# Patient Record
Sex: MALE | Race: WHITE | NOT HISPANIC OR LATINO | Employment: UNEMPLOYED | URBAN - METROPOLITAN AREA
[De-identification: names, ages, dates, MRNs, and addresses within clinical notes are randomized per-mention and may not be internally consistent; named-entity substitution may affect disease eponyms.]

---

## 2018-04-24 ENCOUNTER — TRANSFERRED RECORDS (OUTPATIENT)
Dept: HEALTH INFORMATION MANAGEMENT | Facility: CLINIC | Age: 43
End: 2018-04-24

## 2018-04-25 ENCOUNTER — MEDICAL CORRESPONDENCE (OUTPATIENT)
Dept: HEALTH INFORMATION MANAGEMENT | Facility: CLINIC | Age: 43
End: 2018-04-25

## 2018-04-25 ENCOUNTER — TRANSFERRED RECORDS (OUTPATIENT)
Dept: HEALTH INFORMATION MANAGEMENT | Facility: CLINIC | Age: 43
End: 2018-04-25

## 2018-04-30 ENCOUNTER — OFFICE VISIT (OUTPATIENT)
Dept: NEUROSURGERY | Facility: CLINIC | Age: 43
End: 2018-04-30
Attending: NEUROLOGICAL SURGERY
Payer: COMMERCIAL

## 2018-04-30 ENCOUNTER — OFFICE VISIT (OUTPATIENT)
Dept: NEUROSURGERY | Facility: CLINIC | Age: 43
End: 2018-04-30
Payer: COMMERCIAL

## 2018-04-30 VITALS
HEART RATE: 72 BPM | RESPIRATION RATE: 16 BRPM | DIASTOLIC BLOOD PRESSURE: 80 MMHG | WEIGHT: 212.4 LBS | TEMPERATURE: 97 F | SYSTOLIC BLOOD PRESSURE: 117 MMHG | OXYGEN SATURATION: 98 %

## 2018-04-30 DIAGNOSIS — D35.2 PITUITARY MACROADENOMA (H): Primary | ICD-10-CM

## 2018-04-30 DIAGNOSIS — E23.6 PITUITARY MASS (H): ICD-10-CM

## 2018-04-30 DIAGNOSIS — E23.6 PITUITARY MASS (H): Primary | ICD-10-CM

## 2018-04-30 LAB
ANION GAP SERPL CALCULATED.3IONS-SCNC: 5 MMOL/L (ref 3–14)
BUN SERPL-MCNC: 13 MG/DL (ref 7–30)
CALCIUM SERPL-MCNC: 8.9 MG/DL (ref 8.5–10.1)
CHLORIDE SERPL-SCNC: 106 MMOL/L (ref 94–109)
CO2 SERPL-SCNC: 27 MMOL/L (ref 20–32)
CORTIS SERPL-MCNC: 2.8 UG/DL (ref 4–22)
CREAT SERPL-MCNC: 1.06 MG/DL (ref 0.66–1.25)
FSH SERPL-ACNC: 15.2 IU/L (ref 0.7–10.8)
GFR SERPL CREATININE-BSD FRML MDRD: 76 ML/MIN/1.7M2
GLUCOSE SERPL-MCNC: 85 MG/DL (ref 70–99)
LH SERPL-ACNC: 2.7 IU/L (ref 1.5–9.3)
POTASSIUM SERPL-SCNC: 3.7 MMOL/L (ref 3.4–5.3)
PROLACTIN SERPL-MCNC: 8 UG/L (ref 2–18)
SODIUM SERPL-SCNC: 138 MMOL/L (ref 133–144)
T4 FREE SERPL-MCNC: 0.75 NG/DL (ref 0.76–1.46)
TSH SERPL DL<=0.005 MIU/L-ACNC: 1.37 MU/L (ref 0.4–4)

## 2018-04-30 PROCEDURE — 83002 ASSAY OF GONADOTROPIN (LH): CPT | Performed by: STUDENT IN AN ORGANIZED HEALTH CARE EDUCATION/TRAINING PROGRAM

## 2018-04-30 PROCEDURE — 82533 TOTAL CORTISOL: CPT | Performed by: STUDENT IN AN ORGANIZED HEALTH CARE EDUCATION/TRAINING PROGRAM

## 2018-04-30 PROCEDURE — 84443 ASSAY THYROID STIM HORMONE: CPT | Performed by: STUDENT IN AN ORGANIZED HEALTH CARE EDUCATION/TRAINING PROGRAM

## 2018-04-30 PROCEDURE — 36415 COLL VENOUS BLD VENIPUNCTURE: CPT | Performed by: STUDENT IN AN ORGANIZED HEALTH CARE EDUCATION/TRAINING PROGRAM

## 2018-04-30 PROCEDURE — 84403 ASSAY OF TOTAL TESTOSTERONE: CPT | Performed by: STUDENT IN AN ORGANIZED HEALTH CARE EDUCATION/TRAINING PROGRAM

## 2018-04-30 PROCEDURE — 83001 ASSAY OF GONADOTROPIN (FSH): CPT | Performed by: STUDENT IN AN ORGANIZED HEALTH CARE EDUCATION/TRAINING PROGRAM

## 2018-04-30 PROCEDURE — 82024 ASSAY OF ACTH: CPT | Performed by: STUDENT IN AN ORGANIZED HEALTH CARE EDUCATION/TRAINING PROGRAM

## 2018-04-30 PROCEDURE — 84146 ASSAY OF PROLACTIN: CPT | Performed by: STUDENT IN AN ORGANIZED HEALTH CARE EDUCATION/TRAINING PROGRAM

## 2018-04-30 PROCEDURE — 84305 ASSAY OF SOMATOMEDIN: CPT | Performed by: STUDENT IN AN ORGANIZED HEALTH CARE EDUCATION/TRAINING PROGRAM

## 2018-04-30 PROCEDURE — 84439 ASSAY OF FREE THYROXINE: CPT | Performed by: STUDENT IN AN ORGANIZED HEALTH CARE EDUCATION/TRAINING PROGRAM

## 2018-04-30 PROCEDURE — 80048 BASIC METABOLIC PNL TOTAL CA: CPT | Performed by: STUDENT IN AN ORGANIZED HEALTH CARE EDUCATION/TRAINING PROGRAM

## 2018-04-30 PROCEDURE — 83003 ASSAY GROWTH HORMONE (HGH): CPT | Performed by: STUDENT IN AN ORGANIZED HEALTH CARE EDUCATION/TRAINING PROGRAM

## 2018-04-30 NOTE — LETTER
4/30/2018       RE: Vincenzo Avery  [address] [place] [address] [address]  [facility] [address]     Dear Colleague,    Thank you for referring your patient, Vincenzo Avery, to the [facility] NEUROSURGERY at [facility]. Please see a copy of my visit note below.    4/30/2018       Dear Dr. Meneses:    This 43 year old left handed man who presents for pituitary mass lesion found after presenting with L peripheral field cut which started 2 years ago. Within the last 2-3 months, the patient noticed that his Right peripheral fields were increasingly diminished. Headaches started 4 months prior and are worsening. He does note tinnitus, imbalance, positional headache (leaning forward and with Valsalva), and marked fatigue.     Denies galactorrhea or breast enlargement. No changes in shoe or glove size. Denies issues with erectile dysfunction or libido. Has not been tested for diabetes. Denies weight loss. Reports weight gain of 15 lbs. since December 2017. Denies polydipsia. Does report nocturia (3-4 episodes). Denies colorless urine. Reports chronically poor gustation secondary to tongue injury from licking battery as a child. Reports poor olfaction of unclear onset.     Patient was seen by Dr. Meneses, in [place], on initial presentation who had findings of bitemporal hemianopsia. Visual acuity per Dr. Meneses (OD 20/50; OS 20/100). OCT showed minimum rim width within normal limits both eyes. RNFLT shows thinning temporal both eyes, more so on the left.     Past Medical History:    15 years prior, patient had infection from wisdom tooth extraction complicated by sepsis requiring R orchiectomy. No other known medical diagnoses or hospitalizations.     Surgical History:   R orchiectomy  R wrist ORIF     Social history: never smoker. + cannabis. Denies other drugs. 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 Has two children: 19 yo and 9 year old.     Family history: no history of brain  "tumor. + diabetes.     Review of systems: As above.    Physical exam:   /80 (BP Location: Right arm, Patient Position: Right side, Cuff Size: Adult Regular)  Pulse 72  Temp 97  F (36.1  C) (Oral)  Resp 16  Wt 96.3 kg (212 lb 6.4 oz)  SpO2 98%    General: Awake and alert and in no acute distress.  Pulm: Breathing comfortably on room air  CN: Symmetric browlift, smile, tongue protrusion, palate elevation, and sternocleidomastoids. No dysarthria. Extraocular muscles are all intact. Diplopia on bilateral horizontal gaze and inferior gaze. No diplopia on up gaze. Pupils react bilaterally and equally without afferent pupillary defect. Bilateral temporal field loss L significantly greater than R on direct confrontation. Coordination: Intact finger-nose-finger but more difficult due to vision loss on the L.   Motor: No pronator drift. Good muscle bulk throughout. 5 out of 5 strength in bilateral upper and lower extremities  Reflexes: 2+ reflexes bilateral biceps and patellar tendons.     Imaging:  MRI brain from 4/25/18: large pituitary lesion with mass effect on optic chiasm, Right internal carotid encasement, erosion of the sphenoid sinus and cavernous sinus on the right.     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     Differential diagnosis was discussed with the patient including the further diagnostic workup that is required to determine the best course of action for treatment. We discussed that treatment would have the primary goal of preventing further visual deterioration.  Plan:  - pituitary labs  - pending labs; possible resection in 2-3 weeks with ENT  - will call patient concerning results and plan after pituitary labs result    Patient seen and discussed with MD Eddie Ace \"Sudhir\" MD Elaina, Neurosurgery, PGY-1    I have reviewed the history. I have seen and examined the patient myself and agree with the " assessment and plan above. The patient has vision loss secondary to a very large (4 cm) pituitary adenoma. The tumor has expanded the sellar floor and has marked right parasellar extension. The pituitary gland appears to be displaced superiorly and to the left. The optic apparatus is compressed. The majority of this 45 minute visit was spent discussing management of pituitary tumors. We went over the endoscopic transnasal approach, assuming this will not be a prolactinoma. He understands the risks of surgery including death, stroke, carotid artery injury, CSF leak, panhypopituitarism, infection, hemorrhage, need for reoperation, and agrees to proceed.    ADDENDUM: Prolactin level was within the reference range. We shall proceed with surgery scheduling.    ODALYS Neely MD          Again, thank you for allowing me to participate in the care of your patient.      Sincerely,    Rolf Neely MD

## 2018-04-30 NOTE — LETTER
4/30/2018       RE: Vincenzo Avery  [facility] [facility] [address]  [facility] [address]     Dear Colleague,    Thank you for referring your patient, Vincenzo Avery, to the [facility]. Please see a copy of my visit note below.    Patient with pituitary adenoma. I asked my colleague Dr. Neely to see him today. Please see his note for full details.    Again, thank you for allowing me to participate in the care of your patient.      Sincerely,    Carroll Pichardo MD

## 2018-04-30 NOTE — MR AVS SNAPSHOT
After Visit Summary   4/30/2018    Vincenzo Avery    MRN: [id number]           Patient Information     Date Of Birth          1975        Visit Information        Provider Department      4/30/2018 3:30 PM Rolf Neely MD [facility] Neurosurgery        Today's Diagnoses     Pituitary macroadenoma (H)    -  1       Follow-ups after your visit        Your next 10 appointments already scheduled     Jun 29, 2018  7:00 AM CDT   MR BRAIN W/O & W CONTRAST with UUMR1   [facility], [place], [facility] ([facility], [facility])    [address] [id number] [phone]           Take your medicines as usual, unless your doctor tells you not to. Bring a list of your current medicines to your exam (including vitamins, minerals and over-the-counter drugs).  You may or may not receive intravenous (IV) contrast for this exam pending the discretion of the Radiologist.  You do not need to do anything special to prepare.  The MRI machine uses a strong magnet. Please wear clothes without metal (snaps, zippers). A sweatsuit works well, or we may give you a hospital gown.  Please remove any body piercings and hair extensions before you arrive. You will also remove watches, jewelry, hairpins, wallets, dentures, partial dental plates and hearing aids. You may wear contact lenses, and you may be able to wear your rings. We have a safe place to keep your personal items, but it is safer to leave them at home.  **IMPORTANT** THE INSTRUCTIONS BELOW ARE ONLY FOR THOSE PATIENTS WHO HAVE BEEN PRESCRIBED SEDATION OR GENERAL ANESTHESIA DURING THEIR MRI PROCEDURE:  IF YOUR DOCTOR PRESCRIBED ORAL SEDATION (take medicine to help you relax during your exam):   You must get the medicine from your doctor (oral medication) before you arrive. Bring the medicine to the exam. Do not take it at home. You ll be told when to take it upon arriving for your exam.   Arrive one hour  early. Bring someone who can take you home after the test. Your medicine will make you sleepy. After the exam, you may not drive, take a bus or take a taxi by yourself.  IF YOUR DOCTOR PRESCRIBED IV SEDATION:   Arrive one hour early. Bring someone who can take you home after the test. Your medicine will make you sleepy. After the exam, you may not drive, take a bus or take a taxi by yourself.   No eating 6 hours before your exam. You may have clear liquids up until 4 hours before your exam. (Clear liquids include water, clear tea, black coffee and fruit juice without pulp.)  IF YOUR DOCTOR PRESCRIBED ANESTHESIA (be asleep for your exam):   Arrive 1 1/2 hours early. Bring someone who can take you home after the test. You may not drive, take a bus or take a taxi by yourself.   No eating 8 hours before your exam. You may have clear liquids up until 4 hours before your exam. (Clear liquids include water, clear tea, black coffee and fruit juice without pulp.)   You will spend four to five hours in the recovery room.  Please call the Imaging Department at your exam site with any questions.            Jun 29, 2018  8:00 AM CDT   CT HEAD W/O CONTRAST with UUCT1   [facility], [place], [place] ([facility], [facility])    [address] [facility] [address] [phone]           Please bring any scans or X-rays taken at other hospitals, if similar tests were done. Also bring a list of your medicines, including vitamins, minerals and over-the-counter drugs. It is safest to leave personal items at home.  Be sure to tell your doctor:   If you have any allergies.   If there s any chance you are pregnant.   If you are breastfeeding.  You do not need to do anything special to prepare for this exam.  Please wear loose clothing, such as a sweat suit or jogging clothes. Avoid snaps, zippers and other metal. We may ask you to undress and put on a hospital gown.            Jun 29, 2018   Procedure  with Jo-Ann Green MD   [facility], [place], Same Day Surgery (--)    [address] [facility] [id number] [phone]              Future tests that were ordered for you today     Open Future Orders        Priority Expected Expires Ordered    MRI Brain with & without gadolinium [XWQ296] Routine  5/3/2019 5/3/2018    CT Head without contrast [TVV611] Routine  5/3/2019 5/3/2018            Who to contact     Please call your clinic at [phone] to:    Ask questions about your health    Make or cancel appointments    Discuss your medicines    Learn about your test results    Speak to your doctor            Additional Information About Your Visit        [email or web address] Information     [employer] is an electronic gateway that provides easy, online access to your medical records. With [employer], you can request a clinic appointment, read your test results, renew a prescription or communicate with your care team.     To sign up for [employer] visit the website at [email or web address]   You will be asked to enter the access code listed below, as well as some personal information. Please follow the directions to create your username and password.     Your access code is: [email or web address]  Expires: 2018  6:30 AM     Your access code will  in 90 days. If you need help or a new code, please contact your [facility] or call [phone] for assistance.        Care EveryWhere ID     This is your Care EveryWhere ID. This could be used by other organizations to access your [place] medical records  [id number]         Blood Pressure from Last 3 Encounters:   18 117/80    Weight from Last 3 Encounters:   18 96.3 kg (212 lb 6.4 oz)              Today, you had the following     No orders found for display       Primary Care Provider Fax #    Physician No Ref-Primary [phone]       No address on file        Equal Access to Services     VICENTE RODARTE : Josee noonan  Gama, becky andrews, deonte tanmaychris yee, abebe medin hayaan alisiatracy sarahdonna laamielmer richie. So [facility] [phone].    ATENCIÓN: Si habla español, tiene a peralta disposición servicios gratuitos de asistencia lingüística. Mari al [phone].    We comply with applicable federal civil rights laws and [place] laws. We do not discriminate on the basis of race, color, national origin, age, disability, sex, sexual orientation, or gender identity.            Thank you!     Thank you for choosing [facility] NEUROSURGERY  for your care. Our goal is always to provide you with excellent care. Hearing back from our patients is one way we can continue to improve our services. Please take a few minutes to complete the written survey that you may receive in the mail after your visit with us. Thank you!             Your Updated Medication List - Protect others around you: Learn how to safely use, store and throw away your medicines at www.disposemymeds.org.      Notice  As of 4/30/2018 11:59 PM    You have not been prescribed any medications.

## 2018-04-30 NOTE — LETTER
4/30/2018    RE: Vincenzo Avery  [address] [place] [address] [address]  [facility] [address]     4/30/2018       Dear Dr. Meneses:    This 43 year old left handed man who presents for pituitary mass lesion found after presenting with L peripheral field cut which started 2 years ago. Within the last 2-3 months, the patient noticed that his Right peripheral fields were increasingly diminished. Headaches started 4 months prior and are worsening. He does note tinnitus, imbalance, positional headache (leaning forward and with Valsalva), and marked fatigue.     Denies galactorrhea or breast enlargement. No changes in shoe or glove size. Denies issues with erectile dysfunction or libido. Has not been tested for diabetes. Denies weight loss. Reports weight gain of 15 lbs. since December 2017. Denies polydipsia. Does report nocturia (3-4 episodes). Denies colorless urine. Reports chronically poor gustation secondary to tongue injury from licking battery as a child. Reports poor olfaction of unclear onset.     Patient was seen by Dr. Meneses, in [place], on initial presentation who had findings of bitemporal hemianopsia. Visual acuity per Dr. Meneses (OD 20/50; OS 20/100). OCT showed minimum rim width within normal limits both eyes. RNFLT shows thinning temporal both eyes, more so on the left.     Past Medical History:    15 years prior, patient had infection from wisdom tooth extraction complicated by sepsis requiring R orchiectomy. No other known medical diagnoses or hospitalizations.     Surgical History:   R orchiectomy  R wrist ORIF     Social history: never smoker. + cannabis. Denies other drugs. 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 Has two children: 19 yo and 9 year old.     Family history: no history of brain tumor. + diabetes.     Review of systems: As above.    Physical exam:   /80 (BP Location: Right arm, Patient Position: Right side, Cuff Size: Adult Regular)  Pulse 72  Temp 97  F (36.1  C) (Oral)   "Resp 16  Wt 96.3 kg (212 lb 6.4 oz)  SpO2 98%    General: Awake and alert and in no acute distress.  Pulm: Breathing comfortably on room air  CN: Symmetric browlift, smile, tongue protrusion, palate elevation, and sternocleidomastoids. No dysarthria. Extraocular muscles are all intact. Diplopia on bilateral horizontal gaze and inferior gaze. No diplopia on up gaze. Pupils react bilaterally and equally without afferent pupillary defect. Bilateral temporal field loss L significantly greater than R on direct confrontation. Coordination: Intact finger-nose-finger but more difficult due to vision loss on the L.   Motor: No pronator drift. Good muscle bulk throughout. 5 out of 5 strength in bilateral upper and lower extremities  Reflexes: 2+ reflexes bilateral biceps and patellar tendons.     Imaging:  MRI brain from 4/25/18: large pituitary lesion with mass effect on optic chiasm, Right internal carotid encasement, erosion of the sphenoid sinus and cavernous sinus on the right.     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     Differential diagnosis was discussed with the patient including the further diagnostic workup that is required to determine the best course of action for treatment. We discussed that treatment would have the primary goal of preventing further visual deterioration.  Plan:  - pituitary labs  - pending labs; possible resection in 2-3 weeks with ENT  - will call patient concerning results and plan after pituitary labs result    Patient seen and discussed with MD Eddie Ace \"Sudhir\" MD Elaina, Neurosurgery, PGY-1    I have reviewed the history. I have seen and examined the patient myself and agree with the assessment and plan above. The patient has vision loss secondary to a very large (4 cm) pituitary adenoma. The tumor has expanded the sellar floor and has marked right parasellar extension. The pituitary " gland appears to be displaced superiorly and to the left. The optic apparatus is compressed. The majority of this 45 minute visit was spent discussing management of pituitary tumors. We went over the endoscopic transnasal approach, assuming this will not be a prolactinoma. He understands the risks of surgery including death, stroke, carotid artery injury, CSF leak, panhypopituitarism, infection, hemorrhage, need for reoperation, and agrees to proceed.    ADDENDUM: Prolactin level was within the reference range. We shall proceed with surgery scheduling.    MD Rolf Gilmore MD

## 2018-04-30 NOTE — LETTER
Date:May 4, 2018      Patient was self referred, no letter generated. Do not send.        [facility] Health Information

## 2018-04-30 NOTE — MR AVS SNAPSHOT
After Visit Summary   4/30/2018    Vincenzo Avery    MRN: [id number]           Patient Information     Date Of Birth          1975        Visit Information        Provider Department      4/30/2018 3:00 PM Carroll Pichardo MD [facility]        Today's Diagnoses     Pituitary mass (H)    -  1       Follow-ups after your visit        Your next 10 appointments already scheduled     Jun 29, 2018  7:00 AM CDT   MR BRAIN W/O & W CONTRAST with UUMR1   [facility], [place], [facility] ([facility], [facility])    [address] [address] [phone]           Take your medicines as usual, unless your doctor tells you not to. Bring a list of your current medicines to your exam (including vitamins, minerals and over-the-counter drugs).  You may or may not receive intravenous (IV) contrast for this exam pending the discretion of the Radiologist.  You do not need to do anything special to prepare.  The MRI machine uses a strong magnet. Please wear clothes without metal (snaps, zippers). A sweatsuit works well, or we may give you a hospital gown.  Please remove any body piercings and hair extensions before you arrive. You will also remove watches, jewelry, hairpins, wallets, dentures, partial dental plates and hearing aids. You may wear contact lenses, and you may be able to wear your rings. We have a safe place to keep your personal items, but it is safer to leave them at home.  **IMPORTANT** THE INSTRUCTIONS BELOW ARE ONLY FOR THOSE PATIENTS WHO HAVE BEEN PRESCRIBED SEDATION OR GENERAL ANESTHESIA DURING THEIR MRI PROCEDURE:  IF YOUR DOCTOR PRESCRIBED ORAL SEDATION (take medicine to help you relax during your exam):   You must get the medicine from your doctor (oral medication) before you arrive. Bring the medicine to the exam. Do not take it at home. You ll be told when to take it upon arriving for your exam.   Arrive one hour early.  Bring someone who can take you home after the test. Your medicine will make you sleepy. After the exam, you may not drive, take a bus or take a taxi by yourself.  IF YOUR DOCTOR PRESCRIBED IV SEDATION:   Arrive one hour early. Bring someone who can take you home after the test. Your medicine will make you sleepy. After the exam, you may not drive, take a bus or take a taxi by yourself.   No eating 6 hours before your exam. You may have clear liquids up until 4 hours before your exam. (Clear liquids include water, clear tea, black coffee and fruit juice without pulp.)  IF YOUR DOCTOR PRESCRIBED ANESTHESIA (be asleep for your exam):   Arrive 1 1/2 hours early. Bring someone who can take you home after the test. You may not drive, take a bus or take a taxi by yourself.   No eating 8 hours before your exam. You may have clear liquids up until 4 hours before your exam. (Clear liquids include water, clear tea, black coffee and fruit juice without pulp.)   You will spend four to five hours in the recovery room.  Please call the Imaging Department at your exam site with any questions.            Jun 29, 2018  8:00 AM CDT   CT HEAD W/O CONTRAST with UUCT1   [facility], [place], [place] ([facility], [facility])    [address] [address] [phone]           Please bring any scans or X-rays taken at other hospitals, if similar tests were done. Also bring a list of your medicines, including vitamins, minerals and over-the-counter drugs. It is safest to leave personal items at home.  Be sure to tell your doctor:   If you have any allergies.   If there s any chance you are pregnant.   If you are breastfeeding.  You do not need to do anything special to prepare for this exam.  Please wear loose clothing, such as a sweat suit or jogging clothes. Avoid snaps, zippers and other metal. We may ask you to undress and put on a hospital gown.            Jun 29, 2018   Procedure with  "Jo-Ann Green MD   [facility], [place], Same Day Surgery (--)    [address]  [place] [id number] [phone]              Future tests that were ordered for you today     Open Future Orders        Priority Expected Expires Ordered    MRI Brain with & without gadolinium [JSB157] Routine  5/3/2019 5/3/2018    CT Head without contrast [TKY308] Routine  5/3/2019 5/3/2018            Who to contact     If you have questions or need follow up information about today's clinic visit or your schedule please contact [facility] directly at [phone].  Normal or non-critical lab and imaging results will be communicated to you by MyChart, letter or phone within 4 business days after the clinic has received the results. If you do not hear from us within 7 days, please contact the clinic through MyChart or phone. If you have a critical or abnormal lab result, we will notify you by phone as soon as possible.  Submit refill requests through [email or web address] or call your pharmacy and they will forward the refill request to us. Please allow 3 business days for your refill to be completed.          Additional Information About Your Visit        [email or web address] Information     [email or web address] lets you send messages to your doctor, view your test results, renew your prescriptions, schedule appointments and more. To sign up, go to [email or web address] . Click on \"Log in\" on the left side of the screen, which will take you to the Welcome page. Then click on \"Sign up Now\" on the right side of the page.     You will be asked to enter the access code listed below, as well as some personal information. Please follow the directions to create your username and password.     Your access code is: [email or web address]  Expires: 2018  6:30 AM     Your access code will  in 90 days. If you need help or a new code, please call your [place] clinic or [phone].        Care EveryWhere ID     This is your Care EveryWhere ID. This " could be used by other organizations to access your [place] medical records  [id number]        Your Vitals Were     Pulse Temperature Respirations Pulse Oximetry          72 97  F (36.1  C) (Oral) 16 98%         Blood Pressure from Last 3 Encounters:   04/30/18 117/80    Weight from Last 3 Encounters:   04/30/18 96.3 kg (212 lb 6.4 oz)               Primary Care Provider Fax #    Physician No Ref-Primary [phone]       No address on file        Equal Access to Services     VICENTE RODARTE : Hadii aad ku hadasho Soomaali, waaxda luqadaha, qaybta kaalmada adeegyada, waxay idiin hayaan adetracy smitharafritz lagaston . So [facility] [phone].    ATENCIÓN: Si habla español, tiene a peralta disposición servicios gratuitos de asistencia lingüística. LlSt. [facility] [phone].    We comply with applicable federal civil rights laws and [place] laws. We do not discriminate on the basis of race, color, national origin, age, disability, sex, sexual orientation, or gender identity.            Thank you!     Thank you for choosing [facility]  for your care. Our goal is always to provide you with excellent care. Hearing back from our patients is one way we can continue to improve our services. Please take a few minutes to complete the written survey that you may receive in the mail after your visit with us. Thank you!             Your Updated Medication List - Protect others around you: Learn how to safely use, store and throw away your medicines at www.disposemymeds.org.      Notice  As of 4/30/2018 11:59 PM    You have not been prescribed any medications.

## 2018-05-01 LAB
ACTH PLAS-MCNC: 23 PG/ML
GH SERPL-MCNC: 0.3 UG/L (ref 0–3)
IGF-I BLD-MCNC: 150 NG/ML (ref 76–230)

## 2018-05-02 LAB — TESTOST SERPL-MCNC: 306 NG/DL (ref 240–950)

## 2018-05-03 DIAGNOSIS — E23.6 PITUITARY MASS (H): Primary | ICD-10-CM

## 2018-05-03 NOTE — PROGRESS NOTES
Patient with pituitary adenoma. I asked my colleague Dr. Neely to see him today. Please see his note for full details.

## 2018-05-04 ENCOUNTER — TELEPHONE (OUTPATIENT)
Dept: NEUROSURGERY | Facility: CLINIC | Age: 43
End: 2018-05-04

## 2018-05-04 NOTE — TELEPHONE ENCOUNTER
Pt's significant other called with questions.  All her questions were answered.  Gay was told that when the surgical packet arrives to have Vincenzo call to go over the surgical packet.  Gay said that Vincenzo would call.

## 2018-06-14 ENCOUNTER — TELEPHONE (OUTPATIENT)
Dept: NEUROSURGERY | Facility: CLINIC | Age: 43
End: 2018-06-14

## 2018-06-26 ENCOUNTER — TELEPHONE (OUTPATIENT)
Dept: NEUROSURGERY | Facility: CLINIC | Age: 43
End: 2018-06-26

## 2018-06-28 ENCOUNTER — ALLIED HEALTH/NURSE VISIT (OUTPATIENT)
Dept: SURGERY | Facility: CLINIC | Age: 43
End: 2018-06-28
Payer: COMMERCIAL

## 2018-06-28 ENCOUNTER — OFFICE VISIT (OUTPATIENT)
Dept: SURGERY | Facility: CLINIC | Age: 43
End: 2018-06-28
Payer: COMMERCIAL

## 2018-06-28 ENCOUNTER — ANESTHESIA EVENT (OUTPATIENT)
Dept: SURGERY | Facility: CLINIC | Age: 43
End: 2018-06-28
Payer: COMMERCIAL

## 2018-06-28 ENCOUNTER — CARE COORDINATION (OUTPATIENT)
Dept: NEUROSURGERY | Facility: CLINIC | Age: 43
End: 2018-06-28

## 2018-06-28 ENCOUNTER — OFFICE VISIT (OUTPATIENT)
Dept: OTOLARYNGOLOGY | Facility: CLINIC | Age: 43
End: 2018-06-28
Payer: COMMERCIAL

## 2018-06-28 ENCOUNTER — TELEPHONE (OUTPATIENT)
Dept: NEUROSURGERY | Facility: CLINIC | Age: 43
End: 2018-06-28

## 2018-06-28 ENCOUNTER — APPOINTMENT (OUTPATIENT)
Dept: SURGERY | Facility: CLINIC | Age: 43
End: 2018-06-28
Payer: COMMERCIAL

## 2018-06-28 VITALS
OXYGEN SATURATION: 97 % | HEIGHT: 69 IN | SYSTOLIC BLOOD PRESSURE: 124 MMHG | RESPIRATION RATE: 18 BRPM | DIASTOLIC BLOOD PRESSURE: 85 MMHG | WEIGHT: 204.2 LBS | TEMPERATURE: 98.3 F | HEART RATE: 61 BPM | BODY MASS INDEX: 30.24 KG/M2

## 2018-06-28 VITALS — WEIGHT: 203 LBS | HEIGHT: 69 IN | BODY MASS INDEX: 30.07 KG/M2

## 2018-06-28 DIAGNOSIS — Z01.818 PREOP EXAMINATION: ICD-10-CM

## 2018-06-28 DIAGNOSIS — Z01.818 PREOP EXAMINATION: Primary | ICD-10-CM

## 2018-06-28 DIAGNOSIS — E23.6 PITUITARY MASS (H): Primary | ICD-10-CM

## 2018-06-28 LAB
ANION GAP SERPL CALCULATED.3IONS-SCNC: 6 MMOL/L (ref 3–14)
APTT PPP: 31 SEC (ref 22–37)
BUN SERPL-MCNC: 17 MG/DL (ref 7–30)
CALCIUM SERPL-MCNC: 9.3 MG/DL (ref 8.5–10.1)
CHLORIDE SERPL-SCNC: 108 MMOL/L (ref 94–109)
CO2 SERPL-SCNC: 27 MMOL/L (ref 20–32)
CREAT SERPL-MCNC: 1 MG/DL (ref 0.66–1.25)
ERYTHROCYTE [DISTWIDTH] IN BLOOD BY AUTOMATED COUNT: 13.2 % (ref 10–15)
GFR SERPL CREATININE-BSD FRML MDRD: 81 ML/MIN/1.7M2
GLUCOSE SERPL-MCNC: 90 MG/DL (ref 70–99)
HCT VFR BLD AUTO: 37.9 % (ref 40–53)
HGB BLD-MCNC: 12.7 G/DL (ref 13.3–17.7)
INR PPP: 0.91 (ref 0.86–1.14)
MCH RBC QN AUTO: 30.4 PG (ref 26.5–33)
MCHC RBC AUTO-ENTMCNC: 33.5 G/DL (ref 31.5–36.5)
MCV RBC AUTO: 91 FL (ref 78–100)
PLATELET # BLD AUTO: 191 10E9/L (ref 150–450)
POTASSIUM SERPL-SCNC: 4 MMOL/L (ref 3.4–5.3)
RBC # BLD AUTO: 4.18 10E12/L (ref 4.4–5.9)
SODIUM SERPL-SCNC: 141 MMOL/L (ref 133–144)
WBC # BLD AUTO: 6.9 10E9/L (ref 4–11)

## 2018-06-28 PROCEDURE — 86923 COMPATIBILITY TEST ELECTRIC: CPT | Performed by: NURSE PRACTITIONER

## 2018-06-28 ASSESSMENT — ENCOUNTER SYMPTOMS: ORTHOPNEA: 0

## 2018-06-28 ASSESSMENT — PATIENT HEALTH QUESTIONNAIRE - PHQ9
SUM OF ALL RESPONSES TO PHQ QUESTIONS 1-9: 18
10. IF YOU CHECKED OFF ANY PROBLEMS, HOW DIFFICULT HAVE THESE PROBLEMS MADE IT FOR YOU TO DO YOUR WORK, TAKE CARE OF THINGS AT HOME, OR GET ALONG WITH OTHER PEOPLE: EXTREMELY DIFFICULT
SUM OF ALL RESPONSES TO PHQ QUESTIONS 1-9: 18

## 2018-06-28 ASSESSMENT — LIFESTYLE VARIABLES: TOBACCO_USE: 0

## 2018-06-28 ASSESSMENT — PAIN SCALES - GENERAL: PAINLEVEL: SEVERE PAIN (6)

## 2018-06-28 NOTE — TELEPHONE ENCOUNTER
Writer reached out to pt to go over his surgical booklet with him.  A message was left.   [place] [facility] [place] and PAC.  Writer offered to meet with pt prior to the ENT appointment go over the packet with him over the phone.  Surgery is tomorrow 6/29.

## 2018-06-28 NOTE — NURSING NOTE
"Chief Complaint   Patient presents with     Consult     preop ENT eval     Height 1.753 m (5' 9\"), weight 92.1 kg (203 lb).    Luis Sutton    "

## 2018-06-28 NOTE — MR AVS SNAPSHOT
After Visit Summary   2018    Vincenzo Avery    MRN: [id number]           Patient Information     Date Of Birth          1975        Visit Information        Provider Department      2018 1:40 PM Jo-Ann Green MD [facility]        Today's Diagnoses     Pituitary mass (H)    -  1      Care Instructions    1.  You were seen in the ENT Clinic today by Dr. Siegel.  If you have any questions or concerns after your appointment, please call [phone].  Press option #1 for scheduling related needs.  Press option #3 for Nurse advice.  2.  Plan is to proceed with surgery tomorrow.  Please follow up 3 weeks post-op for removal of nasal splints.                 Follow-ups after your visit        Your next 10 appointments already scheduled     2018  5:20 PM CDT   (Arrive by 5:05 PM)   Return Visit with MD RICK [facility] ([facility] [place])    [address] [place] [address] [phone]              Who to contact     Please call your clinic at [phone] to:    Ask questions about your health    Make or cancel appointments    Discuss your medicines    Learn about your test results    Speak to your doctor            Additional Information About Your Visit        MyChart Information     Way2Payt is an electronic gateway that provides easy, online access to your medical records. With ????, you can request a clinic appointment, read your test results, renew a prescription or communicate with your care team.     To sign up for Way2Payt visit the website at [email or web address]   You will be asked to enter the access code listed below, as well as some personal information. Please follow the directions to create your username and password.     Your access code is: [email or web address]  Expires: 2018  6:30 AM     Your access code will  in 90 days. If you  "need help or a new code, please contact your [facility] or call [phone] for assistance.        Care EveryWhere ID     This is your Care EveryWhere ID. This could be used by other organizations to access your [facility] medical records  [id number]        Your Vitals Were     Height BMI (Body Mass Index)                1.753 m (5' 9\") 29.98 kg/m2           Blood Pressure from Last 3 Encounters:   07/02/18 127/71   06/28/18 124/85   04/30/18 117/80    Weight from Last 3 Encounters:   06/29/18 92.9 kg (204 lb 12.9 oz)   06/28/18 92.6 kg (204 lb 3.2 oz)   06/28/18 92.1 kg (203 lb)              We Performed the Following     NASAL ENDOSCOPY, DIAGNOSTIC        Primary Care Provider Fax #    Physician No Ref-Primary [phone]       No address on file        Equal Access to Services     [facility] : Hadii ike Malloy, waaxda luqadaha, qaybta kaalmada adetracyyada, abebe nagel . So [facility] [phone].    ATENCIÓN: Si habla español, tiene a peralta disposición servicios gratuitos de asistencia lingüística. Llame al [phone].    We comply with applicable federal civil rights laws and [place] laws. We do not discriminate on the basis of race, color, national origin, age, disability, sex, sexual orientation, or gender identity.            Thank you!     Thank you for choosing [facility]  for your care. Our goal is always to provide you with excellent care. Hearing back from our patients is one way we can continue to improve our services. Please take a few minutes to complete the written survey that you may receive in the mail after your visit with us. Thank you!             Your Updated Medication List - Protect others around you: Learn how to safely use, store and throw away your medicines at www.disposemymeds.org.          This list is accurate as of 6/28/18 11:59 PM.  Always use your most recent med list.                   Brand " Name Dispense Instructions for use Diagnosis    * hydrocortisone 10 MG tablet    CORTEF    30 tablet    Take 1 tablet (10 mg) by mouth every evening    Pituitary mass (H)       * hydrocortisone 20 MG tablet    CORTEF    30 tablet    Take 1 tablet (20 mg) by mouth every morning    Pituitary mass (H)       oxyCODONE IR 10 MG tablet    ROXICODONE    60 tablet    Take 0.5-1 tablets (5-10 mg) by mouth every 3 hours as needed for moderate to severe pain    Pituitary mass (H)       pantoprazole 40 MG EC tablet    PROTONIX    30 tablet    Take 1 tablet (40 mg) by mouth every morning (before breakfast)    Pituitary mass (H)       sodium chloride 0.65 % nasal spray    OCEAN NASAL SPRAY    15 mL    Spray 1 spray into both nostrils daily as needed for congestion    Pituitary mass (H)       * Notice:  This list has 2 medication(s) that are the same as other medications prescribed for you. Read the directions carefully, and ask your doctor or other care provider to review them with you.

## 2018-06-28 NOTE — PROGRESS NOTES
Telephone Pre-op Teaching    Procedure: Optical tracking system endoscopic resection tumor, cranial  Planned Surgery Date: 6/29/2018  Surgeon: Chin  PAC(if applicable): 6/28/2018                    Pre-op folder with specific written instructions mailed to patient on:5/1/2018 for review.    Follow-up call to discuss pre-op instructions/routine and requirements to include:  surgical procedure, post-op recovery and expectations, need for H&P/PAC visit, NPO prior to OR, pre-op antibacterial showers, pain control and importance of follow-up visits.  Surgery scheduling will coordinate OR time/date and update patient as appropriate.  3C will call with more instructions 24-48 hour pre-op.  Ample time was provided for patient questions and in-depth discussion of topics of heightened interest.  Reviewed medication list and provided instructions regarding what medications to stop prior to surgery: 6/28.   Antibacterial soap solution will be provided at PAC visit as well as specific instructions for use pre-op.  Patient verbalized understanding of instructions.  Approximately 15 minutes spent on the telephone with patient discussing and reviewing specific instructions.      Patient was provided triage contact number for questions or concerns that may arise prior to surgery.

## 2018-06-28 NOTE — H&P
Pre-Operative H & P     Date of Encounter: 6/28/2018  Primary Care Physician:  No Ref-Primary, Physician    CC: Pituitary tumor.      HPI:  Vincenzo Avery is a 43 year old male who presents for pre-operative H & P in preparation for Stealth-Assisted Endoscopic Transnasal Resection of Pitutitary Tumor, Possible Lumbar Drain Insertion on 6/29/18 with Dr. Green and Dr. Neely at [facility].     The patient was evaluated by Dr. Neely on 4/30/18 in regards to a pituitary mass.  He reported that he first noted a left peripheral field cut approximately two years prior.  Nearly 2-3 months prior to his appointment in April, he noted that his right peripheral field vision was increasingly diminished.  He also noted progressive headaches over the past four months.  He also reported tinnitus, imbalance, positional headache, and marked fatigue.  Imaging identified a large pitutiary lesion with mass effect on the optic chiasm, encasement of the right internal carotid artery, erosion of the sphenoid and cavernous sinuses on the right.  Arrangements are now being made for the above procedures.    History is obtained from the patient and the medical record.    Past Medical History:  Past Medical History:   Diagnosis Date     History of kidney stones      Migraines      NIC (obstructive sleep apnea)     Does not use CPAP     Pituitary mass (H)      Past Surgical History:  Past Surgical History:   Procedure Laterality Date     DENTAL SURGERY      Chana teeth extraction     HAND SURGERY Right     Repair of fracture     ORCHIECTOMY SCROTAL Right      Hx of Blood transfusions/reactions: Denies.    Hx of abnormal bleeding or anti-platelet use: Patient reports that he has been taking Excedrin Migraine 2 tablets BID for headaches, his last dose was on the evening of 6/27.  Dr. Gamez's staff was notified.      Menstrual history: No LMP for male patient.    Steroid use in the  last year: Denies.    Personal or FH of difficulty with anesthesia: Notes that after previous procedures, he would pull at tubes, etc when first waking up from anesthesia.      Prior to admission medications  Current Outpatient Prescriptions   Medication Sig Dispense Refill     Aspirin-Acetaminophen-Caffeine (EXCEDRIN MIGRAINE PO) Take 2 tablets by mouth as needed       Allergies  Allergies   Allergen Reactions     Morphine      Patient has significant nausea/vomiting with IV morphine.       Social History  Social History     Social History     Marital status: Single     Spouse name: N/A     Number of children: N/A     Years of education: N/A     Occupational History     Not on file.     Social History Main Topics     Smoking status: Never Smoker     Smokeless tobacco: Never Used     Alcohol use Yes      Comment: Rarely     Drug use: Yes     Special: Marijuana      Comment: Daily marijuana use for pain/headaches     Sexual activity: Not on file     Other Topics Concern     Not on file     Social History Narrative     Family History  Family History   Problem Relation Age of Onset     Cerebrovascular Disease Mother      Diabetes Mother      Hypertension Mother      Back Pain Father      Degenerative joint/disc disease     Other - See Comments Sister      Injuries sustained in an MVC     Hypertension Brother      Obesity Brother      Cerebrovascular Disease Maternal Grandmother      Breast Cancer Maternal Grandmother      Hypertension Maternal Grandfather      Medical History Unknown Paternal Grandmother      No Known Problems Sister      Medical History Unknown Paternal Grandfather      Review of Systems  Functional status: Independent in ADL's.  4 METS.     The complete review of systems is negative other than noted in the HPI or here.   Constitutional: Denies recent changes in weight, fevers.  Notes occasional chills.  Reports that his sleep is very poor due to headaches and a sensation of increased intracranial  "pressure.  In addition, he often has to get up during the night to urinate, which interrupts his sleep.    Eyes: Reports that his vision is very limited in the left eye, and he estimates that he has approximately \"two-thirds\" of his normal vision in the right eye.  The vision in the right eye has continued to decrease since he was first evaluated at our facility approximately 6 weeks ago.    EENT: Denies mouth pain, or difficulty swallowing.  Bilateral tinnitus of varying intensity.    Cardiovascular: Denies chest pain, MCNULTY or orthopnea, or palpitations.  Respiratory: Denies shortness of breath or significant cough.    GI: Denies nausea/vomiting or diarrhea/constipation.    : Denies dysuria.  Notes nocturia.      Musculoskeletal: Denies joint pain or swelling.    Skin: Denies rashes or wounds.    Hematologic: Denies easy bruising or bleeding.    Neurologic: Denies migraines, seizures, numbness/tingling.  Reports dizziness when he is exposed to bright lights.    Psychiatric: Anxiety about his medical issues and upcoming surgery.      /85  Pulse 61  Temp 98.3  F (36.8  C) (Oral)  Resp 18  Ht 1.753 m (5' 9\")  Wt 92.6 kg (204 lb 3.2 oz)  SpO2 97%  BMI 30.16 kg/m2    204 lbs 3.2 oz  5' 9\"   Body mass index is 30.16 kg/(m^2).    Physical Exam  Constitutional: Patient awake, seated upright in a chair, in no apparent distress.  Appears stated age.  Eyes: Pupils equal, round and reactive to light.  Extra ocular muscles intact. Sclera clear.  Conjunctiva normal.  HENT: Head normocephalic.  Oral pharynx intact with moist mucous membranes.  Dentition intact.  No thyromegaly appreciated.   Respiratory: Lung sounds clear to auscultation bilaterally.  No rales, rhonchi, or wheezing noted.    Cardiovascular: S1, S2, regular rate and rhythm.  No murmurs, rubs, or gallops noted. No carotid bruits auscultated.  Radial and pedal pulses palpable, bilaterally.  No edema noted.   GI: Bowel sounds present.  Abdomen rounded, " soft, non-tender to light palpation.  No hepatosplenomegaly or masses palpated.   Genitourinary: Exam deferred.  Lymph/Hematologic: No cervical or supraclavicular lymphadenopathy noted.  No excessive bruising noted.    Skin: Color appropriate for race, warm, dry.  No rashes or wounds at anticipated surgical site.   Musculoskeletal: Full extension of the neck.  No redness, warmth, or swelling of the joints noted. Gross motor strength is normal.    Neurologic: Alert, oriented to name, place and time. Cranial nerves II-XII are grossly intact. Gait is normal.      Neuropsychiatric: Calm, cooperative. Normal affect.     Labs:  6/28/18: WBC:  6.9; Hgb: 12.7; Hct: 37.9; Plt: 191; INR: 0.91; PTT: 31  6/28/18: Na: 141; K: 4; Cl: 108; Glu: 90; BUN: 17; Cr: 1; Ca: 9.3    Lab results were personally reviewed by this provider.      Assessment and Plan  Vincenzo Avery is a 43 year old male scheduled to undergo Stealth-Assisted Endoscopic Transnasal Resection of Pitutitary Tumor, Possible Lumbar Drain Insertion on 6/29/18 with Dr. Green and Dr. Neely.    He has the following specific operative considerations:   1.  Patient reports that in the past, he has pulled at lines, etc when first waking from anesthesia: Recommend that the patient be monitored closely in the PACU.    2.  Diagnosis of obstructive sleep apnea, but does not use CPAP: Recommend careful positioning to prevent airway compromise and close monitoring of the patient's respiratory status.    3.  Type & screen, CBC, INR, PTT, BMP today.    Revised Cardiac Risk Index: 0.4% risk of major adverse cardiac event.  VTE risk: 0.5%  NIC risk: Diagnosed with NIC, but does not use CPAP.  PONV risk score= 2.  (If > 2, anti-emetic intervention is recommended.)  Anesthesia considerations: Refer to PAC assessment in the anesthesia records.    Rosana Claudio NP  Preoperative Assessment Center  [facility]  Phone:  [phone]  Fax: [phone]

## 2018-06-28 NOTE — LETTER
6/28/2018       RE: Vincenzo Avery  [address] [place] [address]  [facility] [address]     Dear Colleague,    Thank you for referring your patient, Vincenzo Avery, to the [facility] EAR NOSE AND THROAT at [facility]. Please see a copy of my visit note below.    Dear Dr. Neely:    I had the pleasure of meeting Vincenzo Avery in consultation today at the [facility] at your request.     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      HISTORY OF PRESENT ILLNESS:  Mr. Avery is a 42-year-old gentleman with about a four year history of headaches.  This was not worked up until recently when the patient developed visual field cuts.  An MRI was obtained, and a diagnosis of macroadenoma was made.  He was evaluated by Dr. Adonis Neely from the Neurosurgery Department, and he was advised to undergo resection of this large    pituitary macroadenoma with compression of the optic chiasm to improve his visual fields.  Besides the headache and the visual loss, the patient does not have any other symptoms.  He denies any history of sinusitis and no history of any sinonasal surgery.      MEDICATIONS:     Current Outpatient Prescriptions   Medication Sig Dispense Refill     hydrocortisone (CORTEF) 10 MG tablet Take 1 tablet (10 mg) by mouth every evening 30 tablet 3     hydrocortisone (CORTEF) 20 MG tablet Take 1 tablet (20 mg) by mouth every morning 30 tablet 3     oxyCODONE IR (ROXICODONE) 10 MG tablet Take 0.5-1 tablets (5-10 mg) by mouth every 3 hours as needed for moderate to severe pain 60 tablet 0     pantoprazole (PROTONIX) 40 MG EC tablet Take 1 tablet (40 mg) by mouth every morning (before breakfast) 30 tablet 3     sodium chloride (OCEAN NASAL SPRAY) 0.65 % nasal spray Spray 1 spray into both nostrils daily as needed for congestion 15 mL 0       ALLERGIES:    Allergies   Allergen Reactions     Morphine      Patient has  significant nausea/vomiting with IV morphine.         PAST MEDICAL HISTORY:   Past Medical History:   Diagnosis Date     History of kidney stones      Migraines      NIC (obstructive sleep apnea)     Does not use CPAP     Pituitary mass (H)         FAMILY HISTORY:    Family History   Problem Relation Age of Onset     Cerebrovascular Disease Mother      Diabetes Mother      Hypertension Mother      Back Pain Father      Degenerative joint/disc disease     Other - See Comments Sister      Injuries sustained in an MVC     Hypertension Brother      Obesity Brother      Cerebrovascular Disease Maternal Grandmother      Breast Cancer Maternal Grandmother      Hypertension Maternal Grandfather      Medical History Unknown Paternal Grandmother      No Known Problems Sister      Medical History Unknown Paternal Grandfather        REVIEW OF SYSTEMS:  12 point ROS was negative other than the symptoms noted above in the HPI.    PHYSICAL EXAMINATION:    Constitutional: The patient was well-groomed and in no acute distress.    Skin: Warm and pink.    Neurologic: Alert and oriented x3. Cranial nerves III-XII within normal limits. Voice quality is normal.    Psychiatric: The patient's affect was calm, cooperative and appropriate.    Respiratory: Breathing comfortably without stridor or exertion of accessory muscles.    Eyes: Pupils were equal and reactive. Extraocular movements are intact.    Head: Normocephalic and atraumatic. No lesions or scars.    Ears: External auditory canals and tympanic membranes were clear.    Nose: Sinuses were nontender. Anterior rhinoscopy revealed midline septum and absence of purulence or polyps.    Oral cavity/Oropharynx: Normal tongue, floor of mouth, buccal mucosa and palate. No lesions or masses on inspection or palpation. No abnormal lymph tissue in the oropharynx.    Neck: The parotids are soft without masses. Supple with normal laryngeal and tracheal landmarks.    Lymphatic: There is no  palpable lymphadenopathy or other masses in the neck or parotids.       PROCEDURE: Nasal endoscopy: The risks and benefits of the procedure were discussed and written consent was obtained. A timeout was performed. The patient's nose was sprayed with lidocaine and Afrin, and a 0-degree nasal endoscope was used to gain access into the nasal cavity.  Septum on the left side is deviated to the left with a spur in the inferior portion of the septum.  The inferior and middle turbinate appear to be normal.  I do not see any purulent secretion coming from the middle meatus.  The nasopharynx is normal.  On the right side, there is a concavity on the septum.  The inferior and middle turbinate are within normal limits.  The middle meatus shows no evidence of purulence. Nasopharynx is normal.  I do not see any evidence of masses or lesions on the nasal examination.      IMAGING:  MRI was reviewed that shows a very large sellar mass with extensive suprasellar extension as well compromising the right cavernous sinus and pretty much encasing the right carotid artery.      ASSESSMENT AND PLAN:  This is a 43-year-old gentleman with a large pituitary macroadenoma with suprasellar extension, optic chiasm compression, and visual cut fields.  The patient is ready to undergo transnasal endoscopic resection.  I discussed this with the patient and family today.  I will see him back the day of surgery.     Thank you very much for the opportunity to participate in the care of your patient.      Jo-Ann Green M.D.  Otolaryngology- Head & Neck Surgery  [phone]

## 2018-06-28 NOTE — PROGRESS NOTES
Dear Dr. Neely:    I had the pleasure of meeting Vincenzo Avery in consultation today at the [facility] at your request.     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      HISTORY OF PRESENT ILLNESS:  Mr. Avery is a 42-year-old gentleman with about a four year history of headaches.  This was not worked up until recently when the patient developed visual field cuts.  An MRI was obtained, and a diagnosis of macroadenoma was made.  He was evaluated by Dr. Adonis Neely from the Neurosurgery Department, and he was advised to undergo resection of this large    pituitary macroadenoma with compression of the optic chiasm to improve his visual fields.  Besides the headache and the visual loss, the patient does not have any other symptoms.  He denies any history of sinusitis and no history of any sinonasal surgery.      MEDICATIONS:     Current Outpatient Prescriptions   Medication Sig Dispense Refill     hydrocortisone (CORTEF) 10 MG tablet Take 1 tablet (10 mg) by mouth every evening 30 tablet 3     hydrocortisone (CORTEF) 20 MG tablet Take 1 tablet (20 mg) by mouth every morning 30 tablet 3     oxyCODONE IR (ROXICODONE) 10 MG tablet Take 0.5-1 tablets (5-10 mg) by mouth every 3 hours as needed for moderate to severe pain 60 tablet 0     pantoprazole (PROTONIX) 40 MG EC tablet Take 1 tablet (40 mg) by mouth every morning (before breakfast) 30 tablet 3     sodium chloride (OCEAN NASAL SPRAY) 0.65 % nasal spray Spray 1 spray into both nostrils daily as needed for congestion 15 mL 0       ALLERGIES:    Allergies   Allergen Reactions     Morphine      Patient has significant nausea/vomiting with IV morphine.         PAST MEDICAL HISTORY:   Past Medical History:   Diagnosis Date     History of kidney stones      Migraines      NIC (obstructive sleep apnea)     Does not use CPAP     Pituitary mass (H)         FAMILY HISTORY:    Family  History   Problem Relation Age of Onset     Cerebrovascular Disease Mother      Diabetes Mother      Hypertension Mother      Back Pain Father      Degenerative joint/disc disease     Other - See Comments Sister      Injuries sustained in an MVC     Hypertension Brother      Obesity Brother      Cerebrovascular Disease Maternal Grandmother      Breast Cancer Maternal Grandmother      Hypertension Maternal Grandfather      Medical History Unknown Paternal Grandmother      No Known Problems Sister      Medical History Unknown Paternal Grandfather        REVIEW OF SYSTEMS:  12 point ROS was negative other than the symptoms noted above in the HPI.    PHYSICAL EXAMINATION:    Constitutional: The patient was well-groomed and in no acute distress.    Skin: Warm and pink.    Neurologic: Alert and oriented x3. Cranial nerves III-XII within normal limits. Voice quality is normal.    Psychiatric: The patient's affect was calm, cooperative and appropriate.    Respiratory: Breathing comfortably without stridor or exertion of accessory muscles.    Eyes: Pupils were equal and reactive. Extraocular movements are intact.    Head: Normocephalic and atraumatic. No lesions or scars.    Ears: External auditory canals and tympanic membranes were clear.    Nose: Sinuses were nontender. Anterior rhinoscopy revealed midline septum and absence of purulence or polyps.    Oral cavity/Oropharynx: Normal tongue, floor of mouth, buccal mucosa and palate. No lesions or masses on inspection or palpation. No abnormal lymph tissue in the oropharynx.    Neck: The parotids are soft without masses. Supple with normal laryngeal and tracheal landmarks.    Lymphatic: There is no palpable lymphadenopathy or other masses in the neck or parotids.       PROCEDURE: Nasal endoscopy: The risks and benefits of the procedure were discussed and written consent was obtained. A timeout was performed. The patient's nose was sprayed with lidocaine and Afrin, and a  0-degree nasal endoscope was used to gain access into the nasal cavity.  Septum on the left side is deviated to the left with a spur in the inferior portion of the septum.  The inferior and middle turbinate appear to be normal.  I do not see any purulent secretion coming from the middle meatus.  The nasopharynx is normal.  On the right side, there is a concavity on the septum.  The inferior and middle turbinate are within normal limits.  The middle meatus shows no evidence of purulence. Nasopharynx is normal.  I do not see any evidence of masses or lesions on the nasal examination.      IMAGING:  MRI was reviewed that shows a very large sellar mass with extensive suprasellar extension as well compromising the right cavernous sinus and pretty much encasing the right carotid artery.      ASSESSMENT AND PLAN:  This is a 43-year-old gentleman with a large pituitary macroadenoma with suprasellar extension, optic chiasm compression, and visual cut fields.  The patient is ready to undergo transnasal endoscopic resection.  I discussed this with the patient and family today.  I will see him back the day of surgery.     Thank you very much for the opportunity to participate in the care of your patient.      Jo-Ann Green M.D.  Otolaryngology- Head & Neck Surgery  [phone]

## 2018-06-28 NOTE — PATIENT INSTRUCTIONS
Preparing for Your Surgery      Name:  Vincenzo Avery   MRN:  [id number]   :  1975   Today's Date:  2018     Arriving for surgery:  Surgery date:  18  Arrival time:  5:15 AM  Please come to:       [facility] [address]  [address] [place]  [address]    -   parking is available in front of the hospital from 5:15 am to 8:00 pm    -  Stop at the Information Desk in the lobby    -   Inform the information person that you are here for surgery. An escort to 3c will be provided. If you would not like an escort, please proceed to 3C on the 3rd floor. [phone]     What can I eat or drink?  -  You may have solid food or milk products until 8 hours prior to your surgery. 1AM  -  You may have water, apple juice or 7up/Sprite until 2 hours prior to your surgery. 5:15 AM    Which medicines can I take?  Stop Aspirin, vitamins and supplements one week prior to surgery.  Hold Ibuprofen and Naproxen for 24 hours prior to surgery.   -  Do NOT take these medications in the morning, the day of surgery:  Not applicable    -  Please take these medications the day of surgery:  Not applicable    How do I prepare myself?  -  Take two showers: one the night before surgery; and one the morning of surgery.         Use Scrubcare or Hibiclens to wash from neck down.  You may use your own     shampoo and conditioner. No other hair products.   -  Do NOT use lotion, powder, deodorant, or antiperspirant the day of your surgery.  -  Do NOT wear any jewelry.  - Do not bring your own medications to the hospital, except for inhalers and eye   drops.  -  Bring your ID and insurance card.    Questions or Concerns:  -If you have questions or concerns regarding the day of surgery, please call [phone].     -For questions after surgery please call your surgeons office.           AFTER YOUR SURGERY  Breathing exercises   Breathing exercises help you recover faster. Take deep breaths and  let the air out slowly. This will:     Help you wake up after surgery.    Help prevent complications like pneumonia.  Preventing complications will help you go home sooner.   Nausea and vomiting   You may feel sick to your stomach after surgery; if so, let your nurse know.    Pain control:  After surgery, you may have pain. Our goal is to help you manage your pain. Pain medicine will help you feel comfortable enough to do activities that will help you heal.  These activities may include breathing exercises, walking and physical therapy.   To help your health care team treat your pain we will ask: 1) If you have pain  2) where it is located 3) describe your pain in your words  Methods of pain control include medications given by mouth, vein or by nerve block for some surgeries.  Sequential Compression Device (SCD) or Pneumo Boots:  You may need to wear SCD S on your legs or feet. These are wraps connected to a machine that pumps in air and releases it. The repeated pumping helps prevent blood clots from forming.

## 2018-06-28 NOTE — MR AVS SNAPSHOT
After Visit Summary   2018    Vincenzo Avery    MRN: [id number]           Patient Information     Date Of Birth          1975        Visit Information        Provider Department      2018 3:30 PM Rn, [facility]        Care Instructions    Preparing for Your Surgery      Name:  Vincenzo Avery   MRN:  [id number]   :  1975   Today's Date:  2018     Arriving for surgery:  Surgery date:  18  Arrival time:  5:15 AM  Please come to:       [facility] [address]  [address] [place]  [address]    -   parking is available in front of the hospital from 5:15 am to 8:00 pm    -  Stop at the Information Desk in the lobby    -   Inform the information person that you are here for surgery. An escort to 3c will be provided. If you would not like an escort, please proceed to 3C on the 3rd floor. [phone]     What can I eat or drink?  -  You may have solid food or milk products until 8 hours prior to your surgery. 1AM  -  You may have water, apple juice or 7up/Sprite until 2 hours prior to your surgery. 5:15 AM    Which medicines can I take?  Stop Aspirin, vitamins and supplements one week prior to surgery.  Hold Ibuprofen and Naproxen for 24 hours prior to surgery.   -  Do NOT take these medications in the morning, the day of surgery:  Not applicable    -  Please take these medications the day of surgery:  Not applicable    How do I prepare myself?  -  Take two showers: one the night before surgery; and one the morning of surgery.         Use Scrubcare or Hibiclens to wash from neck down.  You may use your own     shampoo and conditioner. No other hair products.   -  Do NOT use lotion, powder, deodorant, or antiperspirant the day of your surgery.  -  Do NOT wear any jewelry.  - Do not bring your own medications to the hospital, except for inhalers and eye   drops.  -  Bring your ID and insurance  card.    Questions or Concerns:  -If you have questions or concerns regarding the day of surgery, please call [phone].     -For questions after surgery please call your surgeons office.           AFTER YOUR SURGERY  Breathing exercises   Breathing exercises help you recover faster. Take deep breaths and let the air out slowly. This will:     Help you wake up after surgery.    Help prevent complications like pneumonia.  Preventing complications will help you go home sooner.   Nausea and vomiting   You may feel sick to your stomach after surgery; if so, let your nurse know.    Pain control:  After surgery, you may have pain. Our goal is to help you manage your pain. Pain medicine will help you feel comfortable enough to do activities that will help you heal.  These activities may include breathing exercises, walking and physical therapy.   To help your health care team treat your pain we will ask: 1) If you have pain  2) where it is located 3) describe your pain in your words  Methods of pain control include medications given by mouth, vein or by nerve block for some surgeries.  Sequential Compression Device (SCD) or Pneumo Boots:  You may need to wear SCD S on your legs or feet. These are wraps connected to a machine that pumps in air and releases it. The repeated pumping helps prevent blood clots from forming.           Follow-ups after your visit        Your next 10 appointments already scheduled     Jun 28, 2018  3:30 PM CDT   (Arrive by 3:15 PM)   PAC RN ASSESSMENT with Desmond Pac Rn   [facility] ([facility])    [address] [place] [id number] [phone]            Jun 28, 2018  4:00 PM CDT   (Arrive by 3:45 PM)   PAC Anesthesia Consult with Desmond Pac Anesthesiologist   [facility] ([facility])    [address] [place] [address] [id number] [phone]             Jun 29, 2018  7:00 AM CDT   MR BRAIN W/O & W CONTRAST with UUMR1   [facility], [place], [facility] ([facility], [facility])    [address] [facility] [address] [phone]           Take your medicines as usual, unless your doctor tells you not to. Bring a list of your current medicines to your exam (including vitamins, minerals and over-the-counter drugs).  You may or may not receive intravenous (IV) contrast for this exam pending the discretion of the Radiologist.  You do not need to do anything special to prepare.  The MRI machine uses a strong magnet. Please wear clothes without metal (snaps, zippers). A sweatsuit works well, or we may give you a hospital gown.  Please remove any body piercings and hair extensions before you arrive. You will also remove watches, jewelry, hairpins, wallets, dentures, partial dental plates and hearing aids. You may wear contact lenses, and you may be able to wear your rings. We have a safe place to keep your personal items, but it is safer to leave them at home.  **IMPORTANT** THE INSTRUCTIONS BELOW ARE ONLY FOR THOSE PATIENTS WHO HAVE BEEN PRESCRIBED SEDATION OR GENERAL ANESTHESIA DURING THEIR MRI PROCEDURE:  IF YOUR DOCTOR PRESCRIBED ORAL SEDATION (take medicine to help you relax during your exam):   You must get the medicine from your doctor (oral medication) before you arrive. Bring the medicine to the exam. Do not take it at home. You ll be told when to take it upon arriving for your exam.   Arrive one hour early. Bring someone who can take you home after the test. Your medicine will make you sleepy. After the exam, you may not drive, take a bus or take a taxi by yourself.  IF YOUR DOCTOR PRESCRIBED IV SEDATION:   Arrive one hour early. Bring someone who can take you home after the test. Your medicine will make you sleepy. After the exam, you may not drive, take a bus or take a taxi by yourself.   No eating 6 hours before your  exam. You may have clear liquids up until 4 hours before your exam. (Clear liquids include water, clear tea, black coffee and fruit juice without pulp.)  IF YOUR DOCTOR PRESCRIBED ANESTHESIA (be asleep for your exam):   Arrive 1 1/2 hours early. Bring someone who can take you home after the test. You may not drive, take a bus or take a taxi by yourself.   No eating 8 hours before your exam. You may have clear liquids up until 4 hours before your exam. (Clear liquids include water, clear tea, black coffee and fruit juice without pulp.)   You will spend four to five hours in the recovery room.  Please call the Imaging Department at your exam site with any questions.            Jun 29, 2018  8:00 AM CDT   CT HEAD W/O CONTRAST with UUCT1   [place], [place] ([facility], [facility])    [address] [facility] [address] [phone]           Please bring any scans or X-rays taken at other hospitals, if similar tests were done. Also bring a list of your medicines, including vitamins, minerals and over-the-counter drugs. It is safest to leave personal items at home.  Be sure to tell your doctor:   If you have any allergies.   If there s any chance you are pregnant.   If you are breastfeeding.  You do not need to do anything special to prepare for this exam.  Please wear loose clothing, such as a sweat suit or jogging clothes. Avoid snaps, zippers and other metal. We may ask you to undress and put on a hospital gown.            Jun 29, 2018   Procedure with Jo-Ann Green MD   [facility], [place], Same Day Surgery (--)    [address] [facility] [id number] [phone]              Future tests that were ordered for you today     Open Future Orders        Priority Expected Expires Ordered    ABO/Rh type and screen Routine 6/28/2018 7/28/2018 6/28/2018    Basic metabolic panel Routine 6/28/2018 7/28/2018 6/28/2018    CBC with platelets Routine 6/28/2018 7/28/2018  2018    INR Routine 2018    Partial thromboplastin time Routine 2018            Who to contact     Please call your clinic at [phone] to:    Ask questions about your health    Make or cancel appointments    Discuss your medicines    Learn about your test results    Speak to your doctor            Additional Information About Your Visit        MyChart Information     Iceberg is an electronic gateway that provides easy, online access to your medical records. With Iceberg, you can request a clinic appointment, read your test results, renew a prescription or communicate with your care team.     To sign up for Iceberg visit the website at [email or web address]   You will be asked to enter the access code listed below, as well as some personal information. Please follow the directions to create your username and password.     Your access code is: [email or web address]  Expires: 2018  6:30 AM     Your access code will  in 90 days. If you need help or a new code, please contact your [facility] or call [phone] for assistance.        Care EveryWhere ID     This is your Care EveryWhere ID. This could be used by other organizations to access your [place] medical records  [id number]         Blood Pressure from Last 3 Encounters:   18 124/85   18 117/80    Weight from Last 3 Encounters:   18 92.6 kg (204 lb 3.2 oz)   18 92.1 kg (203 lb)   18 96.3 kg (212 lb 6.4 oz)              Today, you had the following     No orders found for display       Primary Care Provider Fax #    Physician No Ref-Primary [phone]       No address on file        Equal Access to Services     IVCENTE RODARTE : Hadii ike Malloy, waisatuda luqadaha, qaybta kaalmada nakia, abebe quiroz. So [facility] [phone].    ATENCIÓN: Si habla español, tiene a peralta disposición servicios gratuitos  de asistencia lingüística. Mari william [phone].    We comply with applicable federal civil rights laws and [place] laws. We do not discriminate on the basis of race, color, national origin, age, disability, sex, sexual orientation, or gender identity.            Thank you!     Thank you for choosing [facility]  for your care. Our goal is always to provide you with excellent care. Hearing back from our patients is one way we can continue to improve our services. Please take a few minutes to complete the written survey that you may receive in the mail after your visit with us. Thank you!             Your Updated Medication List - Protect others around you: Learn how to safely use, store and throw away your medicines at www.disposemymeds.org.          This list is accurate as of 6/28/18  3:01 PM.  Always use your most recent med list.                   Brand Name Dispense Instructions for use Diagnosis    EXCEDRIN MIGRAINE PO      Take 2 tablets by mouth as needed

## 2018-06-28 NOTE — ANESTHESIA PREPROCEDURE EVALUATION
This 43 year old left handed man who presents for pituitary mass lesion found after presenting with L peripheral field cut which started 2 years ago. Within the last 2-3 months, the patient noticed that his Right peripheral fields were increasingly diminished. Headaches started 4 months prior and are worsening. He does note tinnitus, imbalance, positional headache (leaning forward and with Valsalva), and marked fatigue.      Denies galactorrhea or breast enlargement. No changes in shoe or glove size. Denies issues with erectile dysfunction or libido. Has not been tested for diabetes. Denies weight loss. Reports weight gain of 15 lbs. since December 2017. Denies polydipsia. Does report nocturia (3-4 episodes). Denies colorless urine. Reports chronically poor gustation secondary to tongue injury from licking battery as a child. Reports poor olfaction of unclear onset.      Patient was seen by Dr. Meneses, in [place], on initial presentation who had findings of bitemporal hemianopsia      Anesthesia Evaluation     . Pt has had prior anesthetic. Type: General    History of anesthetic complications    Pt reports that in the past he has pulled at tubes, etc when first waking from anesthesia.        ROS/MED HX    ENT/Pulmonary:     (+)sleep apnea, doesn't use CPAP , . .   (-) tobacco use and recent URI   Neurologic: Comment: Pituitary tumor (likely macroadenoma); Bilateral hemianopsia, positional headaches for four months, tinnitus, imbalance, poor olfaction, and recent 15 lb weight loss    Patient states that he has had a migraine for 4 years.      (+)migraines, other neuro See comments.    Cardiovascular:  - neg cardiovascular ROS   (+) ----. Taking blood thinners Pt has received instructions: Instructions Given to patient: Pt has been taking Excedrin migraine 2 tablets BID for headaches--last dose was on the evening of 6/27.  Dr. Gamez's office notified.  . . . :. . No previous cardiac testing      (-) MCNULTY,  orthopnea/PND, syncope and irregular heartbeat/palpitations   METS/Exercise Tolerance:  4 - Raking leaves, gardening   Hematologic:  - neg hematologic  ROS       Musculoskeletal:  - neg musculoskeletal ROS       GI/Hepatic:  - neg GI/hepatic ROS       Renal/Genitourinary:     (+) Nephrolithiasis ,       Endo:  - neg endo ROS       Psychiatric:  - neg psychiatric ROS       Infectious Disease:  - neg infectious disease ROS       Malignancy:   (+) Malignancy   See neuro        Other:    (+) H/O Chronic Pain,                   Physical Exam  Normal systems: dental    Airway   Mallampati: II  TM distance: >3 FB  Neck ROM: full    Dental     Cardiovascular   Rhythm and rate: regular and normal  (-) carotid bruit is not present, no peripheral edema and no murmur    Pulmonary    breath sounds clear to auscultation    Other findings: 6/28/18: WBC:  6.9; Hgb: 12.7; Hct: 37.9; Plt: 191; INR: 0.91; PTT: 31  6/28/18: Na: 141; K: 4; Cl: 108; Glu: 90; BUN: 17; Cr: 1; Ca: 9.3           PAC Discussion and Assessment    ASA Classification: 2  Case is suitable for: [facility] [place]  Anesthetic techniques and relevant risks discussed: GA  Invasive monitoring and risk discussed: Yes  Types:   Possibility and Risk of blood transfusion discussed: Yes  NPO instructions given:   Additional anesthetic preparation and risks discussed:   Needs early admission to pre-op area:   Other:     PAC Resident/NP Anesthesia Assessment:  Vincenzo Avery is a 43 year old male scheduled to undergo Stealth-Assisted Endoscopic Transnasal Resection of Pitutitary Tumor, Possible Lumbar Drain Insertion on 6/29/18 with Dr. Green and Dr. Neely.    He has the following specific operative considerations:   1.  Patient reports that in the past, he has pulled at lines, etc when first waking from anesthesia: Recommend that the patient be monitored closely in the PACU.    2.  Diagnosis of obstructive sleep apnea, but does not use CPAP: Recommend careful positioning  to prevent airway compromise and close monitoring of the patient's respiratory status.    3.  Type & screen, CBC, INR, PTT, BMP today.    Revised Cardiac Risk Index: 0.4% risk of major adverse cardiac event.  VTE risk: 0.5%  NIC risk: Diagnosed with NIC, but does not use CPAP.  PONV risk score= 2.  (If > 2, anti-emetic intervention is recommended.)  Anesthesia considerations: Refer to PAC assessment in the anesthesia records.      Reviewed and Signed by PAC Mid-Level Provider/Resident  Mid-Level Provider/Resident: Rosana Claudio CNP  Date: 6/28/18  Time:     Attending Anesthesiologist Anesthesia Assessment:        Anesthesiologist:   Date:   Time:   Pass/Fail:   Disposition:     PAC Pharmacist Assessment:        Pharmacist:   Date:   Time:       Lab Results   Component Value Date     04/30/2018    POTASSIUM 3.7 04/30/2018    CHLORIDE 106 04/30/2018    CO2 27 04/30/2018    BUN 13 04/30/2018    CR 1.06 04/30/2018    GLC 85 04/30/2018 4/25/18 MRI:  Large pituitary lesion with mass effect on optic chiasm, right internal carotid encasement, erosion of sphenoid and cavernous sinus on right    Anesthesia Plan      History & Physical Review  History and physical reviewed and following examination; no interval change.    ASA Status:  2 .    NPO Status:  > 4 hours    Plan for General and ETT with Intravenous induction. Maintenance will be Balanced.    PONV prophylaxis:  Ondansetron (or other 5HT-3) and Dexamethasone or Solumedrol  Additional equipment: 2nd IV and Arterial Line   -F/u H&P and pituitary adenoma workup -  -F/u labs  -Preop Tylenol - ordered    Alana West MD  [facility] 3 Resident  Pager [phone]  ===========================  STAFF:  History and physical assessed; Patient examined.  I have reviewed and agree with this pre-op assessment and anesthetic plan.  Patient and family also agree.   Risks and alternatives presented and discussed. Blood is available.  AQA.  -rcp        Postoperative Care  Postoperative  pain management:  Multi-modal analgesia and IV analgesics.      Consents  Anesthetic plan, risks, benefits and alternatives discussed with:  Patient..                          .

## 2018-06-28 NOTE — PATIENT INSTRUCTIONS
1.  You were seen in the ENT Clinic today by Dr. Siegel.  If you have any questions or concerns after your appointment, please call [phone].  Press option #1 for scheduling related needs.  Press option #3 for Nurse advice.  2.  Plan is to proceed with surgery tomorrow.  Please follow up 3 weeks post-op for removal of nasal splints.

## 2018-06-29 ENCOUNTER — ANESTHESIA (OUTPATIENT)
Dept: SURGERY | Facility: CLINIC | Age: 43
End: 2018-06-29
Payer: COMMERCIAL

## 2018-06-29 ENCOUNTER — HOSPITAL ENCOUNTER (INPATIENT)
Facility: CLINIC | Age: 43
LOS: 3 days | Discharge: HOME | End: 2018-07-02
Attending: OTOLARYNGOLOGY | Admitting: NEUROLOGICAL SURGERY
Payer: COMMERCIAL

## 2018-06-29 ENCOUNTER — HOSPITAL ENCOUNTER (OUTPATIENT)
Dept: MRI IMAGING | Facility: CLINIC | Age: 43
End: 2018-06-29
Attending: NEUROLOGICAL SURGERY | Admitting: OTOLARYNGOLOGY
Payer: COMMERCIAL

## 2018-06-29 ENCOUNTER — HOSPITAL ENCOUNTER (OUTPATIENT)
Dept: CT IMAGING | Facility: CLINIC | Age: 43
End: 2018-06-29
Attending: NEUROLOGICAL SURGERY | Admitting: OTOLARYNGOLOGY
Payer: COMMERCIAL

## 2018-06-29 DIAGNOSIS — E23.6 PITUITARY MASS (H): ICD-10-CM

## 2018-06-29 DIAGNOSIS — E23.6 PITUITARY MASS (H): Primary | ICD-10-CM

## 2018-06-29 PROBLEM — R93.0 MASS IN REGION OF SELLA TURCICA PRESENT ON MAGNETIC RESONANCE IMAGING: Status: ACTIVE | Noted: 2018-06-29

## 2018-06-29 LAB
ABO + RH BLD: NORMAL
ABO + RH BLD: NORMAL
BASE DEFICIT BLDA-SCNC: 1.1 MMOL/L
BASE DEFICIT BLDA-SCNC: 1.5 MMOL/L
BASE DEFICIT BLDA-SCNC: 1.8 MMOL/L
BASE DEFICIT BLDA-SCNC: 3.6 MMOL/L
BASE DEFICIT BLDA-SCNC: 4.4 MMOL/L
BLD GP AB SCN SERPL QL: NORMAL
BLD PROD TYP BPU: NORMAL
BLD UNIT ID BPU: 0
BLD UNIT ID BPU: 0
BLOOD BANK CMNT PATIENT-IMP: NORMAL
BLOOD PRODUCT CODE: NORMAL
BLOOD PRODUCT CODE: NORMAL
BPU ID: NORMAL
BPU ID: NORMAL
CA-I BLD-MCNC: 4.4 MG/DL (ref 4.4–5.2)
CA-I BLD-MCNC: 4.7 MG/DL (ref 4.4–5.2)
CA-I BLD-MCNC: 4.8 MG/DL (ref 4.4–5.2)
ERYTHROCYTE [DISTWIDTH] IN BLOOD BY AUTOMATED COUNT: 13.6 % (ref 10–15)
GLUCOSE BLD-MCNC: 105 MG/DL (ref 70–99)
GLUCOSE BLD-MCNC: 110 MG/DL (ref 70–99)
GLUCOSE BLD-MCNC: 123 MG/DL (ref 70–99)
GLUCOSE BLD-MCNC: 136 MG/DL (ref 70–99)
GLUCOSE BLD-MCNC: 140 MG/DL (ref 70–99)
GLUCOSE BLDC GLUCOMTR-MCNC: 106 MG/DL (ref 70–99)
HCO3 BLD-SCNC: 20 MMOL/L (ref 21–28)
HCO3 BLD-SCNC: 21 MMOL/L (ref 21–28)
HCO3 BLD-SCNC: 23 MMOL/L (ref 21–28)
HCO3 BLD-SCNC: 23 MMOL/L (ref 21–28)
HCO3 BLD-SCNC: 24 MMOL/L (ref 21–28)
HCT VFR BLD AUTO: 25 % (ref 40–53)
HGB BLD-MCNC: 10.1 G/DL (ref 13.3–17.7)
HGB BLD-MCNC: 10.5 G/DL (ref 13.3–17.7)
HGB BLD-MCNC: 12 G/DL (ref 13.3–17.7)
HGB BLD-MCNC: 8.5 G/DL (ref 13.3–17.7)
HGB BLD-MCNC: 9.2 G/DL (ref 13.3–17.7)
HGB BLD-MCNC: 9.6 G/DL (ref 13.3–17.7)
LACTATE BLD-SCNC: 2.4 MMOL/L (ref 0.7–2)
MCH RBC QN AUTO: 30.5 PG (ref 26.5–33)
MCHC RBC AUTO-ENTMCNC: 34 G/DL (ref 31.5–36.5)
MCV RBC AUTO: 90 FL (ref 78–100)
NUM BPU REQUESTED: 2
O2/TOTAL GAS SETTING VFR VENT: 30 %
O2/TOTAL GAS SETTING VFR VENT: 54 %
O2/TOTAL GAS SETTING VFR VENT: 54 %
O2/TOTAL GAS SETTING VFR VENT: 60 %
O2/TOTAL GAS SETTING VFR VENT: 60 %
PCO2 BLD: 35 MM HG (ref 35–45)
PCO2 BLD: 36 MM HG (ref 35–45)
PCO2 BLD: 37 MM HG (ref 35–45)
PCO2 BLD: 37 MM HG (ref 35–45)
PCO2 BLD: 41 MM HG (ref 35–45)
PH BLD: 7.37 PH (ref 7.35–7.45)
PH BLD: 7.37 PH (ref 7.35–7.45)
PH BLD: 7.38 PH (ref 7.35–7.45)
PH BLD: 7.4 PH (ref 7.35–7.45)
PH BLD: 7.41 PH (ref 7.35–7.45)
PLATELET # BLD AUTO: 148 10E9/L (ref 150–450)
PO2 BLD: 158 MM HG (ref 80–105)
PO2 BLD: 172 MM HG (ref 80–105)
PO2 BLD: 201 MM HG (ref 80–105)
PO2 BLD: 211 MM HG (ref 80–105)
PO2 BLD: 80 MM HG (ref 80–105)
POTASSIUM BLD-SCNC: 3.3 MMOL/L (ref 3.4–5.3)
POTASSIUM BLD-SCNC: 3.4 MMOL/L (ref 3.4–5.3)
POTASSIUM BLD-SCNC: 3.8 MMOL/L (ref 3.4–5.3)
POTASSIUM BLD-SCNC: 4.1 MMOL/L (ref 3.4–5.3)
POTASSIUM BLD-SCNC: 4.2 MMOL/L (ref 3.4–5.3)
RBC # BLD AUTO: 2.79 10E12/L (ref 4.4–5.9)
SODIUM BLD-SCNC: 140 MMOL/L (ref 133–144)
SODIUM BLD-SCNC: 141 MMOL/L (ref 133–144)
SPECIMEN EXP DATE BLD: NORMAL
TRANSFUSION STATUS PATIENT QL: NORMAL
WBC # BLD AUTO: 7.1 10E9/L (ref 4–11)

## 2018-06-29 PROCEDURE — 27211022 ZZHC OR IOM SUPPLIES OPNP: Performed by: OTOLARYNGOLOGY

## 2018-06-29 PROCEDURE — 88305 TISSUE EXAM BY PATHOLOGIST: CPT | Performed by: OTOLARYNGOLOGY

## 2018-06-29 PROCEDURE — 84132 ASSAY OF SERUM POTASSIUM: CPT | Performed by: OTOLARYNGOLOGY

## 2018-06-29 PROCEDURE — 88331 PATH CONSLTJ SURG 1 BLK 1SPC: CPT | Performed by: OTOLARYNGOLOGY

## 2018-06-29 PROCEDURE — 84295 ASSAY OF SERUM SODIUM: CPT | Performed by: OTOLARYNGOLOGY

## 2018-06-29 PROCEDURE — 25000128 H RX IP 250 OP 636: Performed by: NURSE ANESTHETIST, CERTIFIED REGISTERED

## 2018-06-29 PROCEDURE — 82330 ASSAY OF CALCIUM: CPT | Performed by: OTOLARYNGOLOGY

## 2018-06-29 PROCEDURE — P9016 RBC LEUKOCYTES REDUCED: HCPCS | Performed by: NURSE PRACTITIONER

## 2018-06-29 PROCEDURE — 40000170 ZZH STATISTIC PRE-PROCEDURE ASSESSMENT II: Performed by: OTOLARYNGOLOGY

## 2018-06-29 PROCEDURE — 25000125 ZZHC RX 250: Performed by: NURSE ANESTHETIST, CERTIFIED REGISTERED

## 2018-06-29 PROCEDURE — 25000128 H RX IP 250 OP 636: Performed by: ANESTHESIOLOGY

## 2018-06-29 PROCEDURE — 25000128 H RX IP 250 OP 636: Performed by: NEUROLOGICAL SURGERY

## 2018-06-29 PROCEDURE — 71000015 ZZH RECOVERY PHASE 1 LEVEL 2 EA ADDTL HR: Performed by: OTOLARYNGOLOGY

## 2018-06-29 PROCEDURE — 27210794 ZZH OR GENERAL SUPPLY STERILE: Performed by: OTOLARYNGOLOGY

## 2018-06-29 PROCEDURE — 0GB04ZZ EXCISION OF PITUITARY GLAND, PERCUTANEOUS ENDOSCOPIC APPROACH: ICD-10-PCS | Performed by: NEUROLOGICAL SURGERY

## 2018-06-29 PROCEDURE — 25000131 ZZH RX MED GY IP 250 OP 636 PS 637: Performed by: ANESTHESIOLOGY

## 2018-06-29 PROCEDURE — 25000125 ZZHC RX 250: Performed by: ANESTHESIOLOGY

## 2018-06-29 PROCEDURE — 27210995 ZZH RX 272: Performed by: OTOLARYNGOLOGY

## 2018-06-29 PROCEDURE — 88342 IMHCHEM/IMCYTCHM 1ST ANTB: CPT | Performed by: OTOLARYNGOLOGY

## 2018-06-29 PROCEDURE — 8E09XBZ COMPUTER ASSISTED PROCEDURE OF HEAD AND NECK REGION: ICD-10-PCS | Performed by: NEUROLOGICAL SURGERY

## 2018-06-29 PROCEDURE — 36000076 ZZH SURGERY LEVEL 6 EA 15 ADDTL MIN - UMMC: Performed by: OTOLARYNGOLOGY

## 2018-06-29 PROCEDURE — 25000566 ZZH SEVOFLURANE, EA 15 MIN: Performed by: OTOLARYNGOLOGY

## 2018-06-29 PROCEDURE — 83605 ASSAY OF LACTIC ACID: CPT | Performed by: OTOLARYNGOLOGY

## 2018-06-29 PROCEDURE — 25000132 ZZH RX MED GY IP 250 OP 250 PS 637: Performed by: ANESTHESIOLOGY

## 2018-06-29 PROCEDURE — 70450 CT HEAD/BRAIN W/O DYE: CPT

## 2018-06-29 PROCEDURE — 40000275 ZZH STATISTIC RCP TIME EA 10 MIN

## 2018-06-29 PROCEDURE — P9041 ALBUMIN (HUMAN),5%, 50ML: HCPCS | Performed by: ANESTHESIOLOGY

## 2018-06-29 PROCEDURE — 40000014 ZZH STATISTIC ARTERIAL MONITORING DAILY

## 2018-06-29 PROCEDURE — 00000146 ZZHCL STATISTIC GLUCOSE BY METER IP

## 2018-06-29 PROCEDURE — 82803 BLOOD GASES ANY COMBINATION: CPT | Performed by: OTOLARYNGOLOGY

## 2018-06-29 PROCEDURE — 85027 COMPLETE CBC AUTOMATED: CPT | Performed by: STUDENT IN AN ORGANIZED HEALTH CARE EDUCATION/TRAINING PROGRAM

## 2018-06-29 PROCEDURE — 88313 SPECIAL STAINS GROUP 2: CPT | Performed by: OTOLARYNGOLOGY

## 2018-06-29 PROCEDURE — 82947 ASSAY GLUCOSE BLOOD QUANT: CPT | Performed by: OTOLARYNGOLOGY

## 2018-06-29 PROCEDURE — 88311 DECALCIFY TISSUE: CPT | Performed by: OTOLARYNGOLOGY

## 2018-06-29 PROCEDURE — 25000132 ZZH RX MED GY IP 250 OP 250 PS 637: Performed by: STUDENT IN AN ORGANIZED HEALTH CARE EDUCATION/TRAINING PROGRAM

## 2018-06-29 PROCEDURE — 95940 IONM IN OPERATNG ROOM 15 MIN: CPT | Performed by: OTOLARYNGOLOGY

## 2018-06-29 PROCEDURE — 36000074 ZZH SURGERY LEVEL 6 1ST 30 MIN - UMMC: Performed by: OTOLARYNGOLOGY

## 2018-06-29 PROCEDURE — 88307 TISSUE EXAM BY PATHOLOGIST: CPT | Performed by: OTOLARYNGOLOGY

## 2018-06-29 PROCEDURE — 25000125 ZZHC RX 250: Performed by: OTOLARYNGOLOGY

## 2018-06-29 PROCEDURE — 37000009 ZZH ANESTHESIA TECHNICAL FEE, EACH ADDTL 15 MIN: Performed by: OTOLARYNGOLOGY

## 2018-06-29 PROCEDURE — 37000008 ZZH ANESTHESIA TECHNICAL FEE, 1ST 30 MIN: Performed by: OTOLARYNGOLOGY

## 2018-06-29 PROCEDURE — C1762 CONN TISS, HUMAN(INC FASCIA): HCPCS | Performed by: OTOLARYNGOLOGY

## 2018-06-29 PROCEDURE — A9585 GADOBUTROL INJECTION: HCPCS | Performed by: NEUROLOGICAL SURGERY

## 2018-06-29 PROCEDURE — 12000003 ZZH R&B CRITICAL UMMC

## 2018-06-29 PROCEDURE — 25000128 H RX IP 250 OP 636: Performed by: STUDENT IN AN ORGANIZED HEALTH CARE EDUCATION/TRAINING PROGRAM

## 2018-06-29 PROCEDURE — 70552 MRI BRAIN STEM W/DYE: CPT

## 2018-06-29 PROCEDURE — 71000014 ZZH RECOVERY PHASE 1 LEVEL 2 FIRST HR: Performed by: OTOLARYNGOLOGY

## 2018-06-29 DEVICE — GRAFT DUREPAIR DURA MATRIX 2X2" 62100: Type: IMPLANTABLE DEVICE | Site: SKULL | Status: FUNCTIONAL

## 2018-06-29 RX ORDER — HYDROCORTISONE 20 MG/1
20 TABLET ORAL EVERY MORNING
Status: DISCONTINUED | OUTPATIENT
Start: 2018-07-02 | End: 2018-07-02 | Stop reason: HOSPADM

## 2018-06-29 RX ORDER — PROCHLORPERAZINE MALEATE 10 MG
10 TABLET ORAL EVERY 6 HOURS PRN
Status: DISCONTINUED | OUTPATIENT
Start: 2018-06-29 | End: 2018-07-02 | Stop reason: HOSPADM

## 2018-06-29 RX ORDER — SODIUM CHLORIDE 9 MG/ML
INJECTION, SOLUTION INTRAVENOUS CONTINUOUS
Status: ACTIVE | OUTPATIENT
Start: 2018-06-29 | End: 2018-06-30

## 2018-06-29 RX ORDER — ACETAMINOPHEN 325 MG/1
975 TABLET ORAL ONCE
Status: COMPLETED | OUTPATIENT
Start: 2018-06-29 | End: 2018-06-29

## 2018-06-29 RX ORDER — POLYETHYLENE GLYCOL 3350 17 G/17G
17 POWDER, FOR SOLUTION ORAL 2 TIMES DAILY
Status: DISCONTINUED | OUTPATIENT
Start: 2018-06-29 | End: 2018-07-01

## 2018-06-29 RX ORDER — CEFAZOLIN SODIUM 1 G/3ML
1 INJECTION, POWDER, FOR SOLUTION INTRAMUSCULAR; INTRAVENOUS SEE ADMIN INSTRUCTIONS
Status: DISCONTINUED | OUTPATIENT
Start: 2018-06-29 | End: 2018-06-29 | Stop reason: ALTCHOICE

## 2018-06-29 RX ORDER — SODIUM CHLORIDE, SODIUM LACTATE, POTASSIUM CHLORIDE, CALCIUM CHLORIDE 600; 310; 30; 20 MG/100ML; MG/100ML; MG/100ML; MG/100ML
INJECTION, SOLUTION INTRAVENOUS CONTINUOUS PRN
Status: DISCONTINUED | OUTPATIENT
Start: 2018-06-29 | End: 2018-06-29

## 2018-06-29 RX ORDER — HYDRALAZINE HYDROCHLORIDE 20 MG/ML
10-20 INJECTION INTRAMUSCULAR; INTRAVENOUS EVERY 30 MIN PRN
Status: DISCONTINUED | OUTPATIENT
Start: 2018-06-29 | End: 2018-07-02 | Stop reason: HOSPADM

## 2018-06-29 RX ORDER — OXYMETAZOLINE HYDROCHLORIDE 0.05 G/100ML
SPRAY NASAL PRN
Status: DISCONTINUED | OUTPATIENT
Start: 2018-06-29 | End: 2018-06-29 | Stop reason: HOSPADM

## 2018-06-29 RX ORDER — NALOXONE HYDROCHLORIDE 0.4 MG/ML
.1-.4 INJECTION, SOLUTION INTRAMUSCULAR; INTRAVENOUS; SUBCUTANEOUS
Status: ACTIVE | OUTPATIENT
Start: 2018-06-29 | End: 2018-06-30

## 2018-06-29 RX ORDER — CALCIUM CHLORIDE 100 MG/ML
INJECTION INTRAVENOUS; INTRAVENTRICULAR PRN
Status: DISCONTINUED | OUTPATIENT
Start: 2018-06-29 | End: 2018-06-29

## 2018-06-29 RX ORDER — SODIUM CHLORIDE, SODIUM LACTATE, POTASSIUM CHLORIDE, AND CALCIUM CHLORIDE .6; .31; .03; .02 G/100ML; G/100ML; G/100ML; G/100ML
IRRIGANT IRRIGATION PRN
Status: DISCONTINUED | OUTPATIENT
Start: 2018-06-29 | End: 2018-06-29 | Stop reason: HOSPADM

## 2018-06-29 RX ORDER — GLYCOPYRROLATE 0.2 MG/ML
INJECTION, SOLUTION INTRAMUSCULAR; INTRAVENOUS PRN
Status: DISCONTINUED | OUTPATIENT
Start: 2018-06-29 | End: 2018-06-29

## 2018-06-29 RX ORDER — LIDOCAINE 40 MG/G
CREAM TOPICAL
Status: DISCONTINUED | OUTPATIENT
Start: 2018-06-29 | End: 2018-07-02 | Stop reason: HOSPADM

## 2018-06-29 RX ORDER — ONDANSETRON 4 MG/1
4-8 TABLET, ORALLY DISINTEGRATING ORAL EVERY 6 HOURS PRN
Status: DISCONTINUED | OUTPATIENT
Start: 2018-06-29 | End: 2018-07-02 | Stop reason: HOSPADM

## 2018-06-29 RX ORDER — ESMOLOL HYDROCHLORIDE 10 MG/ML
INJECTION INTRAVENOUS PRN
Status: DISCONTINUED | OUTPATIENT
Start: 2018-06-29 | End: 2018-06-29

## 2018-06-29 RX ORDER — PANTOPRAZOLE SODIUM 40 MG/1
40 TABLET, DELAYED RELEASE ORAL
Status: DISCONTINUED | OUTPATIENT
Start: 2018-06-30 | End: 2018-07-02 | Stop reason: HOSPADM

## 2018-06-29 RX ORDER — CEFAZOLIN SODIUM 2 G/100ML
2 INJECTION, SOLUTION INTRAVENOUS
Status: DISCONTINUED | OUTPATIENT
Start: 2018-06-29 | End: 2018-06-29 | Stop reason: ALTCHOICE

## 2018-06-29 RX ORDER — ONDANSETRON 2 MG/ML
4-8 INJECTION INTRAMUSCULAR; INTRAVENOUS EVERY 6 HOURS PRN
Status: DISCONTINUED | OUTPATIENT
Start: 2018-06-29 | End: 2018-07-02 | Stop reason: HOSPADM

## 2018-06-29 RX ORDER — HYDROCORTISONE 20 MG/1
20 TABLET ORAL ONCE
Status: COMPLETED | OUTPATIENT
Start: 2018-07-01 | End: 2018-07-01

## 2018-06-29 RX ORDER — ACETAMINOPHEN 325 MG/1
650 TABLET ORAL EVERY 4 HOURS PRN
Status: DISCONTINUED | OUTPATIENT
Start: 2018-06-29 | End: 2018-07-02 | Stop reason: HOSPADM

## 2018-06-29 RX ORDER — DEXAMETHASONE SODIUM PHOSPHATE 4 MG/ML
INJECTION, SOLUTION INTRA-ARTICULAR; INTRALESIONAL; INTRAMUSCULAR; INTRAVENOUS; SOFT TISSUE PRN
Status: DISCONTINUED | OUTPATIENT
Start: 2018-06-29 | End: 2018-06-29

## 2018-06-29 RX ORDER — ALBUMIN, HUMAN INJ 5% 5 %
SOLUTION INTRAVENOUS CONTINUOUS PRN
Status: DISCONTINUED | OUTPATIENT
Start: 2018-06-29 | End: 2018-06-29

## 2018-06-29 RX ORDER — LIDOCAINE HYDROCHLORIDE 20 MG/ML
INJECTION, SOLUTION INFILTRATION; PERINEURAL PRN
Status: DISCONTINUED | OUTPATIENT
Start: 2018-06-29 | End: 2018-06-29

## 2018-06-29 RX ORDER — LABETALOL HYDROCHLORIDE 5 MG/ML
INJECTION, SOLUTION INTRAVENOUS PRN
Status: DISCONTINUED | OUTPATIENT
Start: 2018-06-29 | End: 2018-06-29

## 2018-06-29 RX ORDER — LIDOCAINE HYDROCHLORIDE AND EPINEPHRINE 10; 10 MG/ML; UG/ML
INJECTION, SOLUTION INFILTRATION; PERINEURAL PRN
Status: DISCONTINUED | OUTPATIENT
Start: 2018-06-29 | End: 2018-06-29 | Stop reason: HOSPADM

## 2018-06-29 RX ORDER — ONDANSETRON 4 MG/1
4 TABLET, ORALLY DISINTEGRATING ORAL EVERY 30 MIN PRN
Status: DISCONTINUED | OUTPATIENT
Start: 2018-06-29 | End: 2018-06-29 | Stop reason: HOSPADM

## 2018-06-29 RX ORDER — HYDROCORTISONE 10 MG/1
10 TABLET ORAL EVERY EVENING
Status: DISCONTINUED | OUTPATIENT
Start: 2018-07-02 | End: 2018-07-02 | Stop reason: HOSPADM

## 2018-06-29 RX ORDER — LABETALOL HYDROCHLORIDE 5 MG/ML
10-40 INJECTION, SOLUTION INTRAVENOUS EVERY 10 MIN PRN
Status: DISCONTINUED | OUTPATIENT
Start: 2018-06-29 | End: 2018-07-02 | Stop reason: HOSPADM

## 2018-06-29 RX ORDER — FENTANYL CITRATE 50 UG/ML
INJECTION, SOLUTION INTRAMUSCULAR; INTRAVENOUS PRN
Status: DISCONTINUED | OUTPATIENT
Start: 2018-06-29 | End: 2018-06-29

## 2018-06-29 RX ORDER — FENTANYL CITRATE 50 UG/ML
25-50 INJECTION, SOLUTION INTRAMUSCULAR; INTRAVENOUS EVERY 5 MIN PRN
Status: DISCONTINUED | OUTPATIENT
Start: 2018-06-29 | End: 2018-06-29 | Stop reason: HOSPADM

## 2018-06-29 RX ORDER — MEPERIDINE HYDROCHLORIDE 50 MG/ML
12.5 INJECTION INTRAMUSCULAR; INTRAVENOUS; SUBCUTANEOUS EVERY 5 MIN PRN
Status: DISCONTINUED | OUTPATIENT
Start: 2018-06-29 | End: 2018-06-29 | Stop reason: HOSPADM

## 2018-06-29 RX ORDER — CEFTRIAXONE 2 G/1
2 INJECTION, POWDER, FOR SOLUTION INTRAMUSCULAR; INTRAVENOUS
Status: COMPLETED | OUTPATIENT
Start: 2018-06-29 | End: 2018-06-29

## 2018-06-29 RX ORDER — POTASSIUM CHLORIDE 7.45 MG/ML
INJECTION INTRAVENOUS PRN
Status: DISCONTINUED | OUTPATIENT
Start: 2018-06-29 | End: 2018-06-29

## 2018-06-29 RX ORDER — NALOXONE HYDROCHLORIDE 0.4 MG/ML
.1-.4 INJECTION, SOLUTION INTRAMUSCULAR; INTRAVENOUS; SUBCUTANEOUS
Status: DISCONTINUED | OUTPATIENT
Start: 2018-06-29 | End: 2018-06-29

## 2018-06-29 RX ORDER — ACETAMINOPHEN 160 MG
TABLET,DISINTEGRATING ORAL PRN
Status: DISCONTINUED | OUTPATIENT
Start: 2018-06-29 | End: 2018-06-29 | Stop reason: HOSPADM

## 2018-06-29 RX ORDER — GADOBUTROL 604.72 MG/ML
10 INJECTION INTRAVENOUS ONCE
Status: COMPLETED | OUTPATIENT
Start: 2018-06-29 | End: 2018-06-29

## 2018-06-29 RX ORDER — ONDANSETRON 2 MG/ML
4 INJECTION INTRAMUSCULAR; INTRAVENOUS EVERY 30 MIN PRN
Status: DISCONTINUED | OUTPATIENT
Start: 2018-06-29 | End: 2018-06-29 | Stop reason: HOSPADM

## 2018-06-29 RX ORDER — PROPOFOL 10 MG/ML
INJECTION, EMULSION INTRAVENOUS PRN
Status: DISCONTINUED | OUTPATIENT
Start: 2018-06-29 | End: 2018-06-29

## 2018-06-29 RX ORDER — ONDANSETRON 2 MG/ML
INJECTION INTRAMUSCULAR; INTRAVENOUS PRN
Status: DISCONTINUED | OUTPATIENT
Start: 2018-06-29 | End: 2018-06-29

## 2018-06-29 RX ORDER — AMOXICILLIN 250 MG
2 CAPSULE ORAL 2 TIMES DAILY
Status: DISCONTINUED | OUTPATIENT
Start: 2018-06-29 | End: 2018-07-01

## 2018-06-29 RX ORDER — NALOXONE HYDROCHLORIDE 0.4 MG/ML
.1-.4 INJECTION, SOLUTION INTRAMUSCULAR; INTRAVENOUS; SUBCUTANEOUS
Status: DISCONTINUED | OUTPATIENT
Start: 2018-06-29 | End: 2018-06-29 | Stop reason: HOSPADM

## 2018-06-29 RX ORDER — HYDROCORTISONE 20 MG/1
40 TABLET ORAL ONCE
Status: DISCONTINUED | OUTPATIENT
Start: 2018-07-01 | End: 2018-07-02 | Stop reason: HOSPADM

## 2018-06-29 RX ADMIN — FENTANYL CITRATE 25 MCG: 50 INJECTION, SOLUTION INTRAMUSCULAR; INTRAVENOUS at 17:13

## 2018-06-29 RX ADMIN — CALCIUM CHLORIDE 250 MG: 100 INJECTION, SOLUTION INTRAVENOUS at 16:14

## 2018-06-29 RX ADMIN — LABETALOL HYDROCHLORIDE 10 MG: 5 INJECTION INTRAVENOUS at 10:35

## 2018-06-29 RX ADMIN — LABETALOL HYDROCHLORIDE 10 MG: 5 INJECTION INTRAVENOUS at 16:30

## 2018-06-29 RX ADMIN — ALBUMIN HUMAN: 0.05 INJECTION, SOLUTION INTRAVENOUS at 16:13

## 2018-06-29 RX ADMIN — SODIUM CHLORIDE: 9 INJECTION, SOLUTION INTRAVENOUS at 17:54

## 2018-06-29 RX ADMIN — HYDROCORTISONE SODIUM SUCCINATE 100 MG: 100 INJECTION, POWDER, FOR SOLUTION INTRAMUSCULAR; INTRAVENOUS at 14:55

## 2018-06-29 RX ADMIN — CALCIUM CHLORIDE 250 MG: 100 INJECTION, SOLUTION INTRAVENOUS at 15:05

## 2018-06-29 RX ADMIN — PHENYLEPHRINE HYDROCHLORIDE 100 MCG: 10 INJECTION, SOLUTION INTRAMUSCULAR; INTRAVENOUS; SUBCUTANEOUS at 13:39

## 2018-06-29 RX ADMIN — PHENYLEPHRINE HYDROCHLORIDE 100 MCG: 10 INJECTION, SOLUTION INTRAMUSCULAR; INTRAVENOUS; SUBCUTANEOUS at 13:47

## 2018-06-29 RX ADMIN — ALBUMIN HUMAN: 0.05 INJECTION, SOLUTION INTRAVENOUS at 13:56

## 2018-06-29 RX ADMIN — PROPOFOL 50 MG: 10 INJECTION, EMULSION INTRAVENOUS at 08:57

## 2018-06-29 RX ADMIN — ROCURONIUM BROMIDE 30 MG: 10 INJECTION INTRAVENOUS at 15:22

## 2018-06-29 RX ADMIN — PHENYLEPHRINE HYDROCHLORIDE 0.3 MCG/KG/MIN: 10 INJECTION, SOLUTION INTRAMUSCULAR; INTRAVENOUS; SUBCUTANEOUS at 13:51

## 2018-06-29 RX ADMIN — PHENYLEPHRINE HYDROCHLORIDE 50 MCG: 10 INJECTION, SOLUTION INTRAMUSCULAR; INTRAVENOUS; SUBCUTANEOUS at 13:18

## 2018-06-29 RX ADMIN — PROPOFOL 200 MG: 10 INJECTION, EMULSION INTRAVENOUS at 08:35

## 2018-06-29 RX ADMIN — PROPOFOL 50 MG: 10 INJECTION, EMULSION INTRAVENOUS at 09:15

## 2018-06-29 RX ADMIN — CEFTRIAXONE 2 G: 2 INJECTION, POWDER, FOR SOLUTION INTRAMUSCULAR; INTRAVENOUS at 09:32

## 2018-06-29 RX ADMIN — LIDOCAINE HYDROCHLORIDE 100 MG: 20 INJECTION, SOLUTION INFILTRATION; PERINEURAL at 08:35

## 2018-06-29 RX ADMIN — ESMOLOL HYDROCHLORIDE 10 MG: 10 INJECTION, SOLUTION INTRAVENOUS at 09:57

## 2018-06-29 RX ADMIN — ESMOLOL HYDROCHLORIDE 20 MG: 10 INJECTION, SOLUTION INTRAVENOUS at 09:15

## 2018-06-29 RX ADMIN — PROPOFOL 50 MG: 10 INJECTION, EMULSION INTRAVENOUS at 08:49

## 2018-06-29 RX ADMIN — HYDROCORTISONE SODIUM SUCCINATE 100 MG: 100 INJECTION, POWDER, FOR SOLUTION INTRAMUSCULAR; INTRAVENOUS at 20:37

## 2018-06-29 RX ADMIN — PROPOFOL 50 MG: 10 INJECTION, EMULSION INTRAVENOUS at 09:08

## 2018-06-29 RX ADMIN — GADOBUTROL 10 ML: 604.72 INJECTION INTRAVENOUS at 07:37

## 2018-06-29 RX ADMIN — DEXMEDETOMIDINE HYDROCHLORIDE 8 MCG: 100 INJECTION, SOLUTION INTRAVENOUS at 17:10

## 2018-06-29 RX ADMIN — POLYETHYLENE GLYCOL 3350 17 G: 17 POWDER, FOR SOLUTION ORAL at 20:35

## 2018-06-29 RX ADMIN — PHENYLEPHRINE HYDROCHLORIDE 50 MCG: 10 INJECTION, SOLUTION INTRAMUSCULAR; INTRAVENOUS; SUBCUTANEOUS at 14:27

## 2018-06-29 RX ADMIN — ALBUMIN HUMAN: 0.05 INJECTION, SOLUTION INTRAVENOUS at 14:14

## 2018-06-29 RX ADMIN — FENTANYL CITRATE 50 MCG: 50 INJECTION, SOLUTION INTRAMUSCULAR; INTRAVENOUS at 09:15

## 2018-06-29 RX ADMIN — PHENYLEPHRINE HYDROCHLORIDE 50 MCG: 10 INJECTION, SOLUTION INTRAMUSCULAR; INTRAVENOUS; SUBCUTANEOUS at 13:33

## 2018-06-29 RX ADMIN — DEXMEDETOMIDINE HYDROCHLORIDE 0.5 MCG/KG/HR: 100 INJECTION, SOLUTION INTRAVENOUS at 10:41

## 2018-06-29 RX ADMIN — FENTANYL CITRATE 25 MCG: 50 INJECTION, SOLUTION INTRAMUSCULAR; INTRAVENOUS at 16:54

## 2018-06-29 RX ADMIN — ONDANSETRON 4 MG: 2 INJECTION INTRAMUSCULAR; INTRAVENOUS at 16:44

## 2018-06-29 RX ADMIN — ACETAMINOPHEN 650 MG: 325 TABLET, FILM COATED ORAL at 20:50

## 2018-06-29 RX ADMIN — PHENYLEPHRINE HYDROCHLORIDE 50 MCG: 10 INJECTION, SOLUTION INTRAMUSCULAR; INTRAVENOUS; SUBCUTANEOUS at 13:53

## 2018-06-29 RX ADMIN — PHENYLEPHRINE HYDROCHLORIDE 100 MCG: 10 INJECTION, SOLUTION INTRAMUSCULAR; INTRAVENOUS; SUBCUTANEOUS at 14:30

## 2018-06-29 RX ADMIN — FENTANYL CITRATE 25 MCG: 50 INJECTION, SOLUTION INTRAMUSCULAR; INTRAVENOUS at 18:15

## 2018-06-29 RX ADMIN — ROCURONIUM BROMIDE 20 MG: 10 INJECTION INTRAVENOUS at 14:09

## 2018-06-29 RX ADMIN — DEXMEDETOMIDINE HYDROCHLORIDE 4 MCG: 100 INJECTION, SOLUTION INTRAVENOUS at 17:05

## 2018-06-29 RX ADMIN — LABETALOL HYDROCHLORIDE 10 MG: 5 INJECTION INTRAVENOUS at 10:04

## 2018-06-29 RX ADMIN — ROCURONIUM BROMIDE 20 MG: 10 INJECTION INTRAVENOUS at 11:30

## 2018-06-29 RX ADMIN — CALCIUM CHLORIDE 100 MG: 100 INJECTION, SOLUTION INTRAVENOUS at 13:40

## 2018-06-29 RX ADMIN — ACETAMINOPHEN 975 MG: 325 TABLET, FILM COATED ORAL at 17:59

## 2018-06-29 RX ADMIN — CALCIUM CHLORIDE 200 MG: 100 INJECTION, SOLUTION INTRAVENOUS at 13:27

## 2018-06-29 RX ADMIN — ACETAMINOPHEN 975 MG: 325 TABLET, FILM COATED ORAL at 06:55

## 2018-06-29 RX ADMIN — FENTANYL CITRATE 25 MCG: 50 INJECTION, SOLUTION INTRAMUSCULAR; INTRAVENOUS at 18:22

## 2018-06-29 RX ADMIN — ROCURONIUM BROMIDE 20 MG: 10 INJECTION INTRAVENOUS at 12:43

## 2018-06-29 RX ADMIN — FENTANYL CITRATE 50 MCG: 50 INJECTION, SOLUTION INTRAMUSCULAR; INTRAVENOUS at 18:35

## 2018-06-29 RX ADMIN — ROCURONIUM BROMIDE 20 MG: 10 INJECTION INTRAVENOUS at 08:52

## 2018-06-29 RX ADMIN — GLYCOPYRROLATE 0.2 MG: 0.2 INJECTION, SOLUTION INTRAMUSCULAR; INTRAVENOUS at 08:35

## 2018-06-29 RX ADMIN — SODIUM CHLORIDE, POTASSIUM CHLORIDE, SODIUM LACTATE AND CALCIUM CHLORIDE: 600; 310; 30; 20 INJECTION, SOLUTION INTRAVENOUS at 08:24

## 2018-06-29 RX ADMIN — CALCIUM CHLORIDE 200 MG: 100 INJECTION, SOLUTION INTRAVENOUS at 13:32

## 2018-06-29 RX ADMIN — DEXAMETHASONE SODIUM PHOSPHATE 10 MG: 4 INJECTION, SOLUTION INTRA-ARTICULAR; INTRALESIONAL; INTRAMUSCULAR; INTRAVENOUS; SOFT TISSUE at 10:26

## 2018-06-29 RX ADMIN — FENTANYL CITRATE 50 MCG: 50 INJECTION, SOLUTION INTRAMUSCULAR; INTRAVENOUS at 09:54

## 2018-06-29 RX ADMIN — POTASSIUM CHLORIDE 10 MEQ: 7.46 INJECTION, SOLUTION INTRAVENOUS at 14:07

## 2018-06-29 RX ADMIN — SENNOSIDES AND DOCUSATE SODIUM 2 TABLET: 8.6; 5 TABLET ORAL at 20:35

## 2018-06-29 RX ADMIN — FENTANYL CITRATE 100 MCG: 50 INJECTION, SOLUTION INTRAMUSCULAR; INTRAVENOUS at 08:30

## 2018-06-29 RX ADMIN — ROCURONIUM BROMIDE 30 MG: 10 INJECTION INTRAVENOUS at 10:11

## 2018-06-29 RX ADMIN — ROCURONIUM BROMIDE 50 MG: 10 INJECTION INTRAVENOUS at 08:35

## 2018-06-29 RX ADMIN — MIDAZOLAM 2 MG: 1 INJECTION INTRAMUSCULAR; INTRAVENOUS at 08:24

## 2018-06-29 RX ADMIN — ALBUMIN HUMAN: 0.05 INJECTION, SOLUTION INTRAVENOUS at 14:52

## 2018-06-29 RX ADMIN — ESMOLOL HYDROCHLORIDE 10 MG: 10 INJECTION, SOLUTION INTRAVENOUS at 10:00

## 2018-06-29 RX ADMIN — LABETALOL HYDROCHLORIDE 10 MG: 5 INJECTION INTRAVENOUS at 10:22

## 2018-06-29 ASSESSMENT — PAIN DESCRIPTION - DESCRIPTORS
DESCRIPTORS: ACHING

## 2018-06-29 ASSESSMENT — VISUAL ACUITY
OU: BLURRED VISION

## 2018-06-29 ASSESSMENT — PATIENT HEALTH QUESTIONNAIRE - PHQ9: SUM OF ALL RESPONSES TO PHQ QUESTIONS 1-9: 18

## 2018-06-29 NOTE — OP NOTE
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      PROCEDURE:  Stealth-guided transnasal endoscopic excision of pituitary macroadenoma.      SURGEON:  Jo-Ann Green MD and Rolf Neely MD.        RESIDENT SURGEON:  Juma Vicente MD.        ANESTHESIA:  General with orotracheal tube.      ESTIMATED BLOOD LOSS:  About 1000 mL.      COMPLICATIONS:  None.      CONDITION:  The patient was extubated and transferred to the recovery room under stable conditions.      INDICATIONS:  Mr. Avery is a 43-year-old gentleman with the above 4-year history of headaches followed by visual field cuts that the patient has been experiencing for the last 3-4 months.  He was diagnosed with a large pituitary macroadenoma with optic chiasm compression.  The patient was advised to undergo resection of this mass.      FINDINGS:  Septum, there is a small lesion on the left anterior septum, this appears to be a papilloma.  There is a very deviated septum to the left with a very large spur that is in contact with the inferior turbinate.  The sphenoid sinus is full of tumor.  Please see Dr. Neely for the findings regarding the tumor.  So procedure, risks, benefits of the procedure were explained to the patient.  Informed consent was obtained.      DESCRIPTION OF PROCEDURE:  The patient was taken to the operating room today 06/29/2018.  The patient was placed in the supine position.  General anesthesia was induced and orotracheal tube was placed with no difficulties.  The operating table was turned 180 degrees and the patient was placed in the [place] head syed by the neurosurgery team.  The Stealth-guided system was set up and found to be very  accurate.  Following this, the patient was prepped and draped in sterile fashion for a transnasal endoscopic approach to the sella region.  Following this, a timeout was performed by myself and the operating room staff.  Next, I used a 0-degree nasal endoscope to gain access into the nasal cavity.  On initial inspection, the septum is deviated to the left with a very large spur to the left contacting the inferior turbinate.  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  Following this, I used 1% lidocaine with 1:100,000 units of epinephrine to inject the right lateral nasal wall and the right septum.  Next, I created a right-sided nasoseptal flap in the usual fashion and this was elevated and was stored in the nasopharynx for further use.  At this time, I did a posterior septectomy.  I dissected the left posterior septal mucosa and I removed the big spur  on the septum.  Next, I created a rotational flap on the left posterior septal mucosa rotated to cover the donor site on the right and this flap was secured with 4-0 chromic.  Next, I removed the right middle turbinate.  This was to gain more access to the right side where there is more tumor.  Next, I performed an anterior posterior ethmoidectomy also to gain better access laterally.  There was some tumor pretty much in the posterior ethmoid that was pushed from the sphenoid sinus.  It is important to note that during the entire procedure, there was more than usual bleeding.  Once we gained access to the sphenoid sinus, I did remove the sphenoid sinus rostrum using a heavy Zehra.  The antral sellar wall was quite expanded with very thin bone.  All this was removed.  As soon as we removed that antral wall the tumor was in good visualization.  I also did a very limited left posterior ethmoidectomy in order to gain more space to move instruments.  Once this was totally exposed, I used a 4 mm keiry drill to drill  the most lateral aspect of the anterior wall of the sphenoid sinus to gain more lateral approach.      Dr. Neely was called into the room.  Please see his note for the details of the tumor removal.  Once the tumor was removed, I did start the reconstruction using a piece of Dura Repair that was applied as an under layer fashion.  This was placed between the dura and the bone.  It is important to note that there was a very small CSF leak, maybe a couple millimeters and this was patched with a piece of Surgicel.  The piece of Dura Repair was secured using small pieces of Surgicel.  Following this, I applied DuraSeal as well as Gelfoam.  Next, I recovered the nasal septal flap from the nasopharynx and I applied back the nasal septal flap into its original position.  After releasing the rotational flap that was also placed back into its original position.  I did secure the right nasal septal flap to the septum with 4-0 chromic.  Next, I applied bilateral Samano splints and these were secured using 2-0 nylon.  At this time, I advanced a nasogastric tube into the stomach under direct visualization and the gastric contents were suctioned.  At this time, the procedure was ended.  The patient was awakened and taken to the recovery room in stable condition.  I was present for the entire length of the case.         SHARON OSORIO MD             D: 2018   T: 2018   MT: ZEINA      Name:     TAMMY OMER   MRN:      [id number]        Account:        [id number]   :      1975           Procedure Date: 2018      Document: [id number]

## 2018-06-29 NOTE — ANESTHESIA PROCEDURE NOTES
Arterial Line Procedure Note  Staff:     Anesthesiologist:  KARELY CORONA    Resident/CRNA:  DANA CALI    Arterial line performed by resident/CRNA in presence of a teaching physician    Location: In OR After Induction  Procedure Start/Stop Times:     patient identified, IV checked, risks and benefits discussed, monitors and equipment checked, pre-op evaluation and at physician/surgeon's request      Correct Patient: Yes      Correct Position: Yes      Correct Site: Yes      Correct Procedure: Yes      Correct Laterality:  N/A    Site Marked:  N/A  Line Placement:     Procedure:  Arterial Line    Insertion Site:  Dorsalis Pedis    Insertion laterality:  Left    Skin Prep: Chloraprep      Patient Prep: patient draped, mask, sterile gloves, hat and hand hygiene      Local skin infiltration:  None    Ultrasound Guided?: No      Catheter size:  20 gauge, Quick cath    Cath secured with: anchor securement device and other (comment)      Dressing:  Tegaderm and Other (See Comment)    Complications:  None obvious    Arterial waveform: Yes      IBP within 10% of NIBP: Yes    Assessment/Narrative:      Smooth foot a-line after unable to thread catheter in Left radial artery.  Pressure held, dressing applied. ---rcp

## 2018-06-29 NOTE — IP AVS SNAPSHOT
[address] [address]    Phone:  [phone]                                       After Visit Summary   6/29/2018    Vincenzo Avery    MRN: [id number]           After Visit Summary Signature Page     I have received my discharge instructions, and my questions have been answered. I have discussed any challenges I see with this plan with the nurse or doctor.    ..........................................................................................................................................  Patient/Patient Representative Signature      ..........................................................................................................................................  Patient Representative Print Name and Relationship to Patient    ..................................................               ................................................  Date                                            Time    ..........................................................................................................................................  Reviewed by Signature/Title    ...................................................              ..............................................  Date                                                            Time

## 2018-06-29 NOTE — IP AVS SNAPSHOT
MRN:[id number]                      After Visit Summary   6/29/2018    Vincenzo Avery    MRN: [id number]           Thank you!     Thank you for choosing [facility] for your care. Our goal is always to provide you with excellent care. Hearing back from our patients is one way we can continue to improve our services. Please take a few minutes to complete the written survey that you may receive in the mail after you visit with us. Thank you!        Patient Information     Date Of Birth          1975        Designated Caregiver       Most Recent Value    Caregiver    Will someone help with your care after discharge? yes    Name of designated caregiver Yasmine Nunez    Phone number of caregiver [phone]    Caregiver address [address] [facility] [address] [employer] [facility] [address] [employer] [facility] [address]      About your hospital stay     You were admitted on:  June 29, 2018 You last received care in the:  [address] [facility]    You were discharged on:  July 2, 2018        Reason for your hospital stay       You underwent surgery to remove a pituitary tumor by  Rolf Neely MD   - If you have not received the results of the pathology (diagnosis) at the time of discharge, our office will call you with these results as soon as they become available.     - You should follow up with Dr. Siegel in ENT clinic in 1-2 weeks for wound check and general post-operative care. You should call [phone] if you have not heard from the hospital within 7 days of discharge regarding your follow-up appointment.    - You should follow up with Dr. Gamboa in Endocrinology clinic in 1-2 weeks for evaluation and management of your hormones. You should call [phone] if you have not heard from the hospital within 7 days of discharge regarding your follow-up appointment.     - You should follow up with  Rolf Neely MD  in Neurosurgery clinic in 3 months for follow-up regarding your pituitary resection. You will need an MRI on the day of your  follow-up appointment. You should call [phone] if you have not heard from the hospital within 7 days of discharge regarding your follow-up appointment.    -If you have a stitch in your lower back from the drain that was placed during surgery, this can be removed by your primary care doctor or in the ENT clinic at your first appointment at 2 weeks.     - If you are on a steroid medication (hydrocortisone or Cortef) please follow the detailed instructions with the prescription to slowly decrease the amount you are taking. Do not stop taking this medication unless instructed by your physicians.       After discharge, your activity restrictions are:   -We encourage short frequent walks, increasing as tolerated.  - No driving until you are seen in clinic and cleared by your neurosurgeon.   - No strenuous activity.  - No lifting more than 10 pounds until you are seen in clinic (a gallon of milk weighs approximately 8 pounds)  - Do not blow your nose until seen and evaluated in the ENT clinic.   -Nasal precautions: no straining, no nose blowing, sneeze and cough with mouth open   - Avoid straining and/or constipation. Please take the medicines you were prescribed to help prevent constipation.   - You are ok to shower, but do not soak your incisions. Do not submerge your head in water. Pat them dry if they get damp.   - No baths, hot tubs or pools for 4-6 weeks after surgery.       Call if you have any of the followin. Temperature greater than 101.5 F.   2. Any clear discharge from your nose or down the back of your throat  3. Any new weakness, numbness or altered mental status.  4. Increased urinary frequency or increased thirst.  5.  Worsening pain that is not improving with the pain medications you were prescribed.     Call [phone] or after 5:00 pm or on weekends call [phone] and ask for the  neurosurgery resident on call. Thank You.                  Who to Call     For medical emergencies, please  call [phone].  For non-urgent questions about your medical care, please call your primary care provider or clinic, None  For questions related to your surgery, please call your surgery clinic        Attending Provider     Provider Specialty    Peter, Jo-Ann Rodriguez MD Otolaryngology    Rolf Neely MD Neurosurgery       Primary Care Provider Fax #    Physician No Ref-Primary [phone]      After Care Instructions     Activity       Your activity upon discharge: activity as tolerated.  No blowing nose, no straws, sneeze with mouth open            Diet       Follow this diet upon discharge: Orders Placed This Encounter      Regular Diet Adult                  Follow-up Appointments     Adult [facility] Follow-up and recommended labs and tests       Follow-up with ENT Dr Siegel in 1-2 weeks  Follow-up with endocrinology Dr. Gamboa in 1-2 weeks  Follow-up with NSG Dr. Neely in 3 months with MRI brain w/wo contrast (pituitary protocol)    Appointments on [place] and/or [facility] (with [facility] or [facility] provider or service). Call [phone] if you haven't heard regarding these appointments within 7 days of discharge.                  Your next 10 appointments already scheduled     Jul 26, 2018  5:20 PM CDT   (Arrive by 5:05 PM)   Return Visit with Jo-Ann Green MD   [facility] ([facility])    [address] [place] [address] [phone]              Pending Results     Date and Time Order Name Status Description    6/30/2018 0028 EKG 12-lead, complete Preliminary     6/29/2018 1523 Surgical pathology exam Preliminary             Statement of Approval     Ordered          07/02/18 0915  I have reviewed and agree with all the recommendations and orders detailed in this document.  EFFECTIVE NOW     Approved and electronically signed by:  Ayden Campoverde MD             Admission Information     Date & Time  "Provider Department Dept. Phone    2018 Rolf Neely MD [address] [facility] [place] [phone]      Your Vitals Were     Blood Pressure Pulse Temperature Respirations Height Weight    127/71 (BP Location: Left arm) 87 97  F (36.1  C) (Axillary) 18 1.753 m (5' 9\") 92.9 kg (204 lb 12.9 oz)    Pulse Oximetry BMI (Body Mass Index)                99% 30.24 kg/m2          [facility] Information     [facility] lets you send messages to your doctor, view your test results, renew your prescriptions, schedule appointments and more. To sign up, go to [email or web address] . Click on \"Log in\" on the left side of the screen, which will take you to the Welcome page. Then click on \"Sign up Now\" on the right side of the page.     You will be asked to enter the access code listed below, as well as some personal information. Please follow the directions to create your username and password.     Your access code is: [email or web address]  Expires: 2018  6:30 AM     Your access code will  in 90 days. If you need help or a new code, please call your [facility] clinic or [phone].        Care EveryWhere ID     This is your Care EveryWhere ID. This could be used by other organizations to access your [facility] [place] medical records  [id number]        Equal Access to Services     [facility] CampusARIELLA AH: Hadii ike warreno Sodalia, waaxda luqadaha, qaybta kaalmada adeegyada, abebe nagel . So [facility] [phone].    ATENCIÓN: Si habla español, tiene a peralta disposición servicios gratuitos de asistencia lingüística. Llame al [phone].    We comply with applicable federal civil rights laws and [place] laws. We do not discriminate on the basis of race, color, national origin, age, disability, sex, sexual orientation, or gender identity.               Review of your medicines      START taking        Dose / Directions    * hydrocortisone 10 MG tablet   Commonly known as:  CORTEF   Used for:  Pituitary mass (H) "        Dose:  10 mg   Take 1 tablet (10 mg) by mouth every evening   Quantity:  30 tablet   Refills:  3       * hydrocortisone 20 MG tablet   Commonly known as:  CORTEF   Used for:  Pituitary mass (H)        Dose:  20 mg   Start taking on:  7/3/2018   Take 1 tablet (20 mg) by mouth every morning   Quantity:  30 tablet   Refills:  3       oxyCODONE IR 10 MG tablet   Commonly known as:  ROXICODONE   Used for:  Pituitary mass (H)        Dose:  5-10 mg   Take 0.5-1 tablets (5-10 mg) by mouth every 3 hours as needed for moderate to severe pain   Quantity:  60 tablet   Refills:  0       pantoprazole 40 MG EC tablet   Commonly known as:  PROTONIX   Used for:  Pituitary mass (H)        Dose:  40 mg   Start taking on:  7/3/2018   Take 1 tablet (40 mg) by mouth every morning (before breakfast)   Quantity:  30 tablet   Refills:  3       sodium chloride 0.65 % nasal spray   Commonly known as:  OCEAN NASAL SPRAY   Used for:  Pituitary mass (H)        Dose:  1 spray   Spray 1 spray into both nostrils daily as needed for congestion   Quantity:  15 mL   Refills:  0       * Notice:  This list has 2 medication(s) that are the same as other medications prescribed for you. Read the directions carefully, and ask your doctor or other care provider to review them with you.      STOP taking     EXCEDRIN MIGRAINE PO                Where to get your medicines      These medications were sent to [facility] [place], [place] - [address] [address]     Phone:  [phone]     hydrocortisone 10 MG tablet    hydrocortisone 20 MG tablet    pantoprazole 40 MG EC tablet    sodium chloride 0.65 % nasal spray         Some of these will need a paper prescription and others can be bought over the counter. Ask your nurse if you have questions.     Bring a paper prescription for each of these medications     oxyCODONE IR 10 MG tablet                Protect others around you: Learn how to safely  use, store and throw away your medicines at www.disposemymeds.org.        Information about OPIOIDS     PRESCRIPTION OPIOIDS: WHAT YOU NEED TO KNOW   We gave you an opioid (narcotic) pain medicine. It is important to manage your pain, but opioids are not always the best choice. You should first try all the other options your care team gave you. Take this medicine for as short a time (and as few doses) as possible.     These medicines have risks:    DO NOT drive when on new or higher doses of pain medicine. These medicines can affect your alertness and reaction times, and you could be arrested for driving under the influence (DUI). If you need to use opioids long-term, talk to your care team about driving.    DO NOT operate heave machinery    DO NOT do any other dangerous activities while taking these medicines.     DO NOT drink any alcohol while taking these medicines.      If the opioid prescribed includes acetaminophen, DO NOT take with any other medicines that contain acetaminophen. Read all labels carefully. Look for the word  acetaminophen  or  Tylenol.  Ask your pharmacist if you have questions or are unsure.    You can get addicted to pain medicines, especially if you have a history of addiction (chemical, alcohol or substance dependence). Talk to your care team about ways to reduce this risk.    Store your pills in a secure place, locked if possible. We will not replace any lost or stolen medicine. If you don t finish your medicine, please throw away (dispose) as directed by your pharmacist. The [place] Pollution Control Agency has more information about safe disposal: https://www.pca.[facility].[place].us/living-green/managing-unwanted-medications.     All opioids tend to cause constipation. Drink plenty of water and eat foods that have a lot of fiber, such as fruits, vegetables, prune juice, apple juice and high-fiber cereal. Take a laxative (Miralax, milk of magnesia, Colace, Senna) if you don t move your bowels  at least every other day.              Medication List: This is a list of all your medications and when to take them. Check marks below indicate your daily home schedule. Keep this list as a reference.      Medications           Morning Afternoon Evening Bedtime As Needed    * hydrocortisone 10 MG tablet   Commonly known as:  CORTEF   Take 1 tablet (10 mg) by mouth every evening   Last time this was given:  20 mg on 7/2/2018  7:47 AM                                * hydrocortisone 20 MG tablet   Commonly known as:  CORTEF   Take 1 tablet (20 mg) by mouth every morning   Start taking on:  7/3/2018   Last time this was given:  20 mg on 7/2/2018  7:47 AM                                oxyCODONE IR 10 MG tablet   Commonly known as:  ROXICODONE   Take 0.5-1 tablets (5-10 mg) by mouth every 3 hours as needed for moderate to severe pain   Last time this was given:  10 mg on 7/2/2018 12:19 PM                                pantoprazole 40 MG EC tablet   Commonly known as:  PROTONIX   Take 1 tablet (40 mg) by mouth every morning (before breakfast)   Start taking on:  7/3/2018   Last time this was given:  40 mg on 7/2/2018  7:47 AM                                sodium chloride 0.65 % nasal spray   Commonly known as:  OCEAN NASAL SPRAY   [place] 1 spray into both nostrils daily as needed for congestion                                * Notice:  This list has 2 medication(s) that are the same as other medications prescribed for you. Read the directions carefully, and ask your doctor or other care provider to review them with you.

## 2018-06-29 NOTE — BRIEF OP NOTE
[facility], [place]    Brief Operative Note    Pre-operative diagnosis: Pituitary Tumor   Post-operative diagnosis Same  Procedure: Procedure(s):  Stealth Assisted Endoscopic Transnasal Resection Of Pituitary Tumor - Wound Class: II-Clean Contaminated  Surgeon: Surgeon(s) and Role:     * Jo-Ann Green MD - Primary     * Rolf Neely MD - Assisting     * Jose Luis Guthrie MD - Resident - Assisting     * Juma Vicente MD - Resident - Assisting  Anesthesia: General   Estimated blood loss: Approximately 800 cc  Drains: None  Specimens:   ID Type Source Tests Collected by Time Destination   A : pituitary gland vs. tumor Tissue Other SURGICAL PATHOLOGY EXAM Jo-Ann Green MD 6/29/2018  3:19 PM    B : Pituitary Mass Tissue Pituitary Gland SURGICAL PATHOLOGY EXAM Jo-Ann Green MD 6/29/2018  4:05 PM    C : Left Nasal Septum Tissue Nose SURGICAL PATHOLOGY EXAM Jo-Ann Green MD 6/29/2018  4:37 PM      Findings:   See dictated op note for full details.  Complications: None.  Implants: None.

## 2018-06-29 NOTE — ANESTHESIA CARE TRANSFER NOTE
Patient: Vincenzo Avery    Procedure(s):  Stealth Assisted Endoscopic Transnasal Resection Of Pituitary Tumor - Wound Class: II-Clean Contaminated    Diagnosis: Pituitary Tumor   Diagnosis Additional Information: No value filed.    Anesthesia Type:   General, ETT     Note:  Airway :Face Mask  Patient transferred to:PACU  Comments: Pt. Pink and breathing spontaneously.  Vitals stable.  Report given to oncoming nurse.  Transfer of care occurred.Handoff Report: Identifed the Patient, Identified the Reponsible Provider, Reviewed the pertinent medical history, Discussed the surgical course, Reviewed Intra-OP anesthesia mangement and issues during anesthesia, Set expectations for post-procedure period and Allowed opportunity for questions and acknowledgement of understanding      Vitals: (Last set prior to Anesthesia Care Transfer)    CRNA VITALS  6/29/2018 1637 - 6/29/2018 1723      6/29/2018             Resp Rate (set): 10                Electronically Signed By: YULIYA Zaragoza CRNA  June 29, 2018  5:23 PM

## 2018-06-30 ENCOUNTER — APPOINTMENT (OUTPATIENT)
Dept: CT IMAGING | Facility: CLINIC | Age: 43
End: 2018-06-30
Attending: STUDENT IN AN ORGANIZED HEALTH CARE EDUCATION/TRAINING PROGRAM
Payer: COMMERCIAL

## 2018-06-30 ENCOUNTER — APPOINTMENT (OUTPATIENT)
Dept: MRI IMAGING | Facility: CLINIC | Age: 43
End: 2018-06-30
Attending: STUDENT IN AN ORGANIZED HEALTH CARE EDUCATION/TRAINING PROGRAM
Payer: COMMERCIAL

## 2018-06-30 ENCOUNTER — APPOINTMENT (OUTPATIENT)
Dept: OCCUPATIONAL THERAPY | Facility: CLINIC | Age: 43
End: 2018-06-30
Attending: STUDENT IN AN ORGANIZED HEALTH CARE EDUCATION/TRAINING PROGRAM
Payer: COMMERCIAL

## 2018-06-30 LAB
ANION GAP SERPL CALCULATED.3IONS-SCNC: 9 MMOL/L (ref 3–14)
BUN SERPL-MCNC: 15 MG/DL (ref 7–30)
CALCIUM SERPL-MCNC: 8.5 MG/DL (ref 8.5–10.1)
CHLORIDE SERPL-SCNC: 114 MMOL/L (ref 94–109)
CO2 SERPL-SCNC: 23 MMOL/L (ref 20–32)
CREAT SERPL-MCNC: 0.89 MG/DL (ref 0.66–1.25)
ERYTHROCYTE [DISTWIDTH] IN BLOOD BY AUTOMATED COUNT: 13.9 % (ref 10–15)
GFR SERPL CREATININE-BSD FRML MDRD: >90 ML/MIN/1.7M2
GLUCOSE SERPL-MCNC: 138 MG/DL (ref 70–99)
HCT VFR BLD AUTO: 22.4 % (ref 40–53)
HGB BLD-MCNC: 7.6 G/DL (ref 13.3–17.7)
MCH RBC QN AUTO: 30 PG (ref 26.5–33)
MCHC RBC AUTO-ENTMCNC: 33.9 G/DL (ref 31.5–36.5)
MCV RBC AUTO: 89 FL (ref 78–100)
OSMOLALITY SERPL: 302 MMOL/KG (ref 275–295)
OSMOLALITY UR: 233 MMOL/KG (ref 100–1200)
PLATELET # BLD AUTO: 163 10E9/L (ref 150–450)
POTASSIUM SERPL-SCNC: 4 MMOL/L (ref 3.4–5.3)
RBC # BLD AUTO: 2.53 10E12/L (ref 4.4–5.9)
SODIUM SERPL-SCNC: 145 MMOL/L (ref 133–144)
SODIUM SERPL-SCNC: 145 MMOL/L (ref 133–144)
SODIUM SERPL-SCNC: 146 MMOL/L (ref 133–144)
SODIUM SERPL-SCNC: 147 MMOL/L (ref 133–144)
SP GR UR STRIP: 1 (ref 1–1.03)
WBC # BLD AUTO: 10.4 10E9/L (ref 4–11)

## 2018-06-30 PROCEDURE — 12000003 ZZH R&B CRITICAL UMMC

## 2018-06-30 PROCEDURE — 25000128 H RX IP 250 OP 636: Performed by: RADIOLOGY

## 2018-06-30 PROCEDURE — 80048 BASIC METABOLIC PNL TOTAL CA: CPT | Performed by: STUDENT IN AN ORGANIZED HEALTH CARE EDUCATION/TRAINING PROGRAM

## 2018-06-30 PROCEDURE — 93005 ELECTROCARDIOGRAM TRACING: CPT

## 2018-06-30 PROCEDURE — 25000132 ZZH RX MED GY IP 250 OP 250 PS 637: Performed by: STUDENT IN AN ORGANIZED HEALTH CARE EDUCATION/TRAINING PROGRAM

## 2018-06-30 PROCEDURE — 83930 ASSAY OF BLOOD OSMOLALITY: CPT | Performed by: NEUROLOGICAL SURGERY

## 2018-06-30 PROCEDURE — 97166 OT EVAL MOD COMPLEX 45 MIN: CPT | Mod: GO

## 2018-06-30 PROCEDURE — 83930 ASSAY OF BLOOD OSMOLALITY: CPT | Performed by: STUDENT IN AN ORGANIZED HEALTH CARE EDUCATION/TRAINING PROGRAM

## 2018-06-30 PROCEDURE — 70553 MRI BRAIN STEM W/O & W/DYE: CPT

## 2018-06-30 PROCEDURE — 25000125 ZZHC RX 250: Performed by: INTERNAL MEDICINE

## 2018-06-30 PROCEDURE — 40000047 ZZH STATISTIC CTO2 CONT OXYGEN TECH TIME EA 90 MIN

## 2018-06-30 PROCEDURE — 70450 CT HEAD/BRAIN W/O DYE: CPT

## 2018-06-30 PROCEDURE — 40000275 ZZH STATISTIC RCP TIME EA 10 MIN

## 2018-06-30 PROCEDURE — 83935 ASSAY OF URINE OSMOLALITY: CPT | Performed by: NEUROLOGICAL SURGERY

## 2018-06-30 PROCEDURE — 85027 COMPLETE CBC AUTOMATED: CPT | Performed by: STUDENT IN AN ORGANIZED HEALTH CARE EDUCATION/TRAINING PROGRAM

## 2018-06-30 PROCEDURE — 84295 ASSAY OF SERUM SODIUM: CPT | Performed by: INTERNAL MEDICINE

## 2018-06-30 PROCEDURE — 93010 ELECTROCARDIOGRAM REPORT: CPT | Performed by: INTERNAL MEDICINE

## 2018-06-30 PROCEDURE — 40000133 ZZH STATISTIC OT WARD VISIT

## 2018-06-30 PROCEDURE — 81003 URINALYSIS AUTO W/O SCOPE: CPT | Performed by: NEUROLOGICAL SURGERY

## 2018-06-30 PROCEDURE — 36415 COLL VENOUS BLD VENIPUNCTURE: CPT | Performed by: INTERNAL MEDICINE

## 2018-06-30 PROCEDURE — 25000128 H RX IP 250 OP 636: Performed by: STUDENT IN AN ORGANIZED HEALTH CARE EDUCATION/TRAINING PROGRAM

## 2018-06-30 PROCEDURE — A9585 GADOBUTROL INJECTION: HCPCS | Performed by: RADIOLOGY

## 2018-06-30 PROCEDURE — 36415 COLL VENOUS BLD VENIPUNCTURE: CPT | Performed by: STUDENT IN AN ORGANIZED HEALTH CARE EDUCATION/TRAINING PROGRAM

## 2018-06-30 RX ORDER — GADOBUTROL 604.72 MG/ML
0.1 INJECTION INTRAVENOUS ONCE
Status: COMPLETED | OUTPATIENT
Start: 2018-06-30 | End: 2018-06-30

## 2018-06-30 RX ORDER — OXYCODONE HYDROCHLORIDE 10 MG/1
10 TABLET ORAL EVERY 4 HOURS PRN
Status: DISCONTINUED | OUTPATIENT
Start: 2018-06-30 | End: 2018-06-30

## 2018-06-30 RX ORDER — DESMOPRESSIN ACETATE 4 UG/ML
0.52 INJECTION, SOLUTION INTRAVENOUS; SUBCUTANEOUS ONCE
Status: COMPLETED | OUTPATIENT
Start: 2018-06-30 | End: 2018-06-30

## 2018-06-30 RX ORDER — OXYCODONE HYDROCHLORIDE 10 MG/1
10 TABLET ORAL
Status: DISCONTINUED | OUTPATIENT
Start: 2018-06-30 | End: 2018-07-02 | Stop reason: HOSPADM

## 2018-06-30 RX ADMIN — ONDANSETRON 4 MG: 2 INJECTION INTRAMUSCULAR; INTRAVENOUS at 12:25

## 2018-06-30 RX ADMIN — OXYCODONE HYDROCHLORIDE 10 MG: 10 TABLET ORAL at 11:34

## 2018-06-30 RX ADMIN — HYDROCORTISONE SODIUM SUCCINATE 100 MG: 100 INJECTION, POWDER, FOR SOLUTION INTRAMUSCULAR; INTRAVENOUS at 12:37

## 2018-06-30 RX ADMIN — PANTOPRAZOLE SODIUM 40 MG: 40 TABLET, DELAYED RELEASE ORAL at 07:42

## 2018-06-30 RX ADMIN — POLYETHYLENE GLYCOL 3350 17 G: 17 POWDER, FOR SOLUTION ORAL at 19:48

## 2018-06-30 RX ADMIN — OXYCODONE HYDROCHLORIDE 10 MG: 10 TABLET ORAL at 00:53

## 2018-06-30 RX ADMIN — HYDROCORTISONE SODIUM SUCCINATE 50 MG: 100 INJECTION, POWDER, FOR SOLUTION INTRAMUSCULAR; INTRAVENOUS at 19:48

## 2018-06-30 RX ADMIN — SENNOSIDES AND DOCUSATE SODIUM 2 TABLET: 8.6; 5 TABLET ORAL at 19:47

## 2018-06-30 RX ADMIN — DESMOPRESSIN ACETATE 0.52 MCG: 4 SOLUTION INTRAVENOUS at 14:36

## 2018-06-30 RX ADMIN — ACETAMINOPHEN 650 MG: 325 TABLET, FILM COATED ORAL at 09:08

## 2018-06-30 RX ADMIN — ONDANSETRON 8 MG: 2 INJECTION INTRAMUSCULAR; INTRAVENOUS at 19:03

## 2018-06-30 RX ADMIN — ACETAMINOPHEN 650 MG: 325 TABLET, FILM COATED ORAL at 16:24

## 2018-06-30 RX ADMIN — HYDROCORTISONE SODIUM SUCCINATE 100 MG: 100 INJECTION, POWDER, FOR SOLUTION INTRAMUSCULAR; INTRAVENOUS at 03:51

## 2018-06-30 RX ADMIN — SODIUM CHLORIDE: 9 INJECTION, SOLUTION INTRAVENOUS at 05:10

## 2018-06-30 RX ADMIN — SENNOSIDES AND DOCUSATE SODIUM 2 TABLET: 8.6; 5 TABLET ORAL at 07:42

## 2018-06-30 RX ADMIN — POLYETHYLENE GLYCOL 3350 17 G: 17 POWDER, FOR SOLUTION ORAL at 07:44

## 2018-06-30 RX ADMIN — OXYCODONE HYDROCHLORIDE 10 MG: 10 TABLET ORAL at 07:42

## 2018-06-30 RX ADMIN — SODIUM CHLORIDE 1000 ML: 9 INJECTION, SOLUTION INTRAVENOUS at 00:55

## 2018-06-30 RX ADMIN — GADOBUTROL 9.2 ML: 604.72 INJECTION INTRAVENOUS at 14:52

## 2018-06-30 RX ADMIN — OXYCODONE HYDROCHLORIDE 10 MG: 10 TABLET ORAL at 14:36

## 2018-06-30 RX ADMIN — OXYCODONE HYDROCHLORIDE 10 MG: 10 TABLET ORAL at 17:48

## 2018-06-30 ASSESSMENT — VISUAL ACUITY
OU: BLURRED VISION
OU: NORMAL ACUITY
OU: BLURRED VISION
OU: NORMAL ACUITY

## 2018-06-30 ASSESSMENT — ACTIVITIES OF DAILY LIVING (ADL): PREVIOUS_RESPONSIBILITIES: MEDICATION MANAGEMENT;FINANCES;WORK

## 2018-06-30 NOTE — PLAN OF CARE
Problem: Patient Care Overview  Goal: Plan of Care/Patient Progress Review  Discharge Planner OT   Patient plan for discharge: home  Current status: OT eval completed, pt unable to tolerate any OOB activity 2/2 migraine. Therapist returned x2, with pt motivated to attempt up out of bed but pain not improved despite meds. Upon last attempt, nurse reports pt not appropriate this date for further activity. Awaiting CT due to decline in medical status.   Barriers to return to prior living situation: medical status, pain, post op precautions  Recommendations for discharge: home with assist pending inpt progress  Rationale for recommendations: Anticipate that once medically stable, pt will progress quickly with 1 discipline only and be able to return to home with assist.        Entered by: Nicole Tao 06/30/2018 1:42 PM

## 2018-06-30 NOTE — PROGRESS NOTES
S: Headache, but vision significantly improved in right visual field.     O:  Exam:  General: Awake;  Alert, In No Acute Distress  Pulm: Breathing Comfortably on RA  Mental status: Oriented x 3  Cranial Nerves: Chronic vision loss in left eye, otherwise cranial Nerves II-XII Intact Bilaterally  Strength:5/5 throughout   Pronator Drift: Absent  Sensory: Intact to Light Touch    A: Doing well post-operatively.     P:  Operation: Status post endoscopic transnasal resection of pituitary tumor  Imaging: pending postoperative MRI  Pain: pain controlled  Endocrine: Cortef taper, endocrinology consult    Blood pressure goals: SBP < 140   Diet: advance diet as tolerated   Hematological goals: Platelets > 100k, INR < 1.5, Hemoglobin > 7   PT/OT: pending  DVT prophylaxis: Sequential compression devices  Ulcer prophylaxis: none indicated  DISPO:  Anticipate D/C Home 7/3  Barriers: evaluation by therapies, ambulating, BM/flatus, voiding without a Smart, tolerating PO diet, pain controlled on PO medications      Please contact neurosurgery resident on call with questions.    Dial * * [phone], enter [phone] when prompted.

## 2018-06-30 NOTE — OR NURSING
Dr. Reynolds at bedside.  MDA to enter sign out.  Also, requested writer check with neurosurg if okay to give toradol.

## 2018-06-30 NOTE — PLAN OF CARE
"Problem: Patient Care Overview  Goal: Plan of Care/Patient Progress Review  Outcome: No Change  Pt. Is POD#1 s/p transnasal endoscopic excision of pituitary macroadenoma.  AVSS.  Neuros intact except for L. Field cut.  Nasal sling has been worn intermittently throughout the day for a scant amount of serosanguineous output from the R. Nare.  This am pt. Said that he was feeling better and that the Oxycodone was keeping his pain tolerable.  Around 1115, pt. Began to complain of a worsening headache.  Pt. Was given Oxycodone around 1130, and around noon he began to c/o his L. Eye \"going dark again.\"  Pt. Said his L. Eye \"goes blind\" whenever he has a bad headache.  Dr. Morton notified of pt.'s worsening vision and increasing headache.  Pt. Also had an emesis around 1230 of 75 cc.  MRI was unable to scan pt. At this time, so a stat head CT was ordered and completed.  Pt. Left for Brain MRI around 1430, and at that time he said he was starting to feel a little better.  MRI now completed, results pending.  Pt. Has had poor PO throughout the day d/t lack of appetite and intermittent nausea.  Urine output has ranged from 375 cc to 100 cc/hr.  DDAVP given SC x 1 this afternoon.  Pt. Ambulates with SBA.  Continue to closely monitor post-op status and I & Os; intervene as needed.      "

## 2018-06-30 NOTE — CONSULTS
Reason for visit/consult: pituitary macroadenoma s/p resection     Referring : Dr Neely / Dr Montoya.     HPI:  Patient is a 43 year old male with a history of kidney stones, NIC, and migraines who was admitted for pituitary mass resection. It was initially found on 4/30/2018. Reported left visual field cut followed by a right cut. Also progressive headaches, tinnitus, imbalance, and fatigue. Weight gain of 15 lbs since December 2017. MRI demonstrated a 4.7 cm mass with mass effect on the optic chiasm, encasement of the right internal carotid artery, erosion of the sphenoid and cavernous sinuses on the right. 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    Patient underwent successful resection on 6/29/2018. He is post-op day #0.  His sodium has increased from 140 yesterday to 146 this morning. Received a 1L NS fluid bolus at around 12AM last night.  Overnight he has had high urine output up to 780 cc/2hrs but also received high fluid volumes. He is net -641 ml since 12 AM. Urine osmolality is 233 and specific gravity is 1.005.    Patient complains of headache but is otherwise ok. No dizziness or lightheadedness. He feels he has been urinating normally and not in larger than usual amounts. Vision is better. No fevers, chills, SOB, or chest pain.         Past Medical/Surgical History:  Past Medical History:   Diagnosis Date     History of kidney stones      Migraines      NIC (obstructive sleep apnea)     Does not use CPAP     Pituitary mass (H)      Past Surgical History:   Procedure Laterality Date     DENTAL SURGERY      [place] teeth extraction     HAND SURGERY Right     Repair of fracture     ORCHIECTOMY SCROTAL Right        Allergies:  Allergies   Allergen Reactions     Morphine      Patient has significant nausea/vomiting with IV morphine.         Current  Medications   Current Facility-Administered Medications   Medication     acetaminophen (TYLENOL) tablet 650 mg     hydrALAZINE (APRESOLINE) injection 10-20 mg     [START ON 7/1/2018] hydrocortisone (CORTEF) tablet 40 mg    And     [START ON 7/1/2018] hydrocortisone (CORTEF) tablet 20 mg    And     [START ON 7/2/2018] hydrocortisone (CORTEF) tablet 20 mg    And     [START ON 7/2/2018] hydrocortisone (CORTEF) tablet 10 mg     hydrocortisone sodium succinate PF (solu-CORTEF) injection 100 mg    Followed by     hydrocortisone sodium succinate PF (solu-CORTEF) injection 50 mg     labetalol (NORMODYNE/TRANDATE) injection 10-40 mg     lidocaine (LMX4) cream     lidocaine 1 % 1 mL     naloxone (NARCAN) injection 0.1-0.4 mg     ondansetron (ZOFRAN-ODT) ODT tab 4-8 mg    Or     ondansetron (ZOFRAN) injection 4-8 mg     oxyCODONE IR (ROXICODONE) tablet 10 mg     pantoprazole (PROTONIX) EC tablet 40 mg     polyethylene glycol (MIRALAX/GLYCOLAX) Packet 17 g     prochlorperazine (COMPAZINE) injection 10 mg    Or     prochlorperazine (COMPAZINE) tablet 10 mg     senna-docusate (SENOKOT-S;PERICOLACE) 8.6-50 MG per tablet 2 tablet     sodium chloride (PF) 0.9% PF flush 3 mL     sodium chloride (PF) 0.9% PF flush 3 mL       Family History:  Family History   Problem Relation Age of Onset     Cerebrovascular Disease Mother      Diabetes Mother      Hypertension Mother      Back Pain Father      Degenerative joint/disc disease     Other - See Comments Sister      Injuries sustained in an MVC     Hypertension Brother      Obesity Brother      Cerebrovascular Disease Maternal Grandmother      Breast Cancer Maternal Grandmother      Hypertension Maternal Grandfather      Medical History Unknown Paternal Grandmother      No Known Problems Sister      Medical History Unknown Paternal Grandfather        Social History:  Social History   Substance Use Topics     Smoking status: Never Smoker     Smokeless tobacco: Never Used     Alcohol use Yes  "     Comment: Rarely     ROS:  10 point negative except as mentioned in HPI    Exam  Blood pressure 136/75, temperature 98  F (36.7  C), temperature source Oral, resp. rate 16, height 1.753 m (5' 9\"), weight 92.9 kg (204 lb 12.9 oz), SpO2 96 %.  Gen: well appearing, nad  HEENT: anicteric, EOMI  Cardio: RRR, no m/r/g  Resp: CTAB  Ab: soft, NT/ND  Ext: no swelling or edema  Feet: no deformities or ulcers, 2+ DP pulses  Neuro: A&Ox3    Labs/Imaging  END[facility] Latest Ref Rng & Units 4/30/2018   TSH 0.40 - 4.00 mU/L 1.37   PROLACTIN 2 - 18 ug/L 8   CORTISOL, SERUM 4 - 22 ug/dL 2.8 (L)   ADRENAL CORTICOTROPIN <47 pg/mL 23   FSH 0.7 - 10.8 IU/L 15.2 (H)   LUTROPIN 1.5 - 9.3 IU/L 2.7   TESTOSTERONE TOTAL 240 - 950 ng/dL 306    - 144 mmol/L 138   POTASSIUM 3.4 - 5.3 mmol/L 3.7   OSMOLALITY 275 - 295 mmol/kg    URINE OSMOLALITY 100 - 1200 mmol/kg    HUMAN GROWTH HORMONE 0 - 3.0 ug/L 0.3   INS GROWTH FACTOR 1 76 - 230 ng/ml 150   GLUCOSE 70 - 99 mg/dL 85   GLUCOSE 70 - 99 mg/dL      Assessment and Plan    Patient is a 43 year old male with a history of kidney stones, NIC, and migraines who was admitted for pituitary mass resection.    Monitor for DI: Urine output higher overnight, sodium increased from 140 to 146. He did receive large fluid bolus of NS 1 L overnight which could skew the results. Urine osm not significantly low and urine specific gravity mildly low. May need a dose of DDAVP pending next sodium check. Can drink to thirst.  Continue I/O monitoring and check urine osm, urine specific gravity and serum sodium if urine output >300cc/h x2 hrs. q8 sodium checks would be fine.       Monitor for adrenal insufficiency: Macroadenoma of this size undergoing resection at risk of secondary adrenal insufficiency. He had a documented low cortisol on 4/30/2018 but this was at 4:10 pm. He is currently on a steroid taper per neurosurgery. Recommend a discharge dose of 20/10 hydrocortisone until follow-up with " endocrine as an outpatient.       Monitor for central hypothyroidism, TSH 1.37, free T4 0.75:  TFTs may be sign of early central hypothyroidism.  Would repeat levels 4-6 weeks post-op.    Patient seen and examined with staff Dr Ortiz.    Tyler Donis MD   Endocrinology Fellow  Pager: [phone]    Attending Note:     Patient was seen and examined with fellow Dr. Donis. The note reflects our mutual findings and plan.     Manny Ortiz MD  [id number]  Endocrinology Service

## 2018-06-30 NOTE — PROGRESS NOTES
"Otolaryngology Progress Note  June 30, 2018    S: No acute events overnight. He was tachycardic and received a bolus. He had a headache and received oxy. He denies postnasal drainage and and has just a few drops of anteriorly. He does not have vision changes or any other symptoms. No metallic taste or clear fluid running from the nose or down his throat.     O: /67  Temp 95.6  F (35.3  C) (Axillary)  Resp 16  Ht 1.753 m (5' 9\")  Wt 92.9 kg (204 lb 12.9 oz)  SpO2 98%  BMI 30.24 kg/m2   General: Alert and oriented x 3, No acute distress   HEENT: PERRL. HB1. EOMI without spontaneous or gaze evoked nystagmus. No anterior nasal bleeding. No posterior nasal drip.    Pulmonary: Breathing non-labored, no stridor, no accessory muscle use.    Intake/Output Summary (Last 24 hours) at 06/30/18 0631  Last data filed at 06/30/18 0510   Gross per 24 hour   Intake          6410.33 ml   Output             4191 ml   Net          2219.33 ml     LABS:  ROUTINE IP LABS (Last four results)  BMP  Recent Labs  Lab 06/29/18  1600 06/29/18  1512 06/29/18  1359 06/29/18  1320  06/28/18  1535    140 140 140  < > 141   POTASSIUM 4.1 4.2 3.3* 3.4  < > 4.0   CHLORIDE  --   --   --   --   --  108   KANA  --   --   --   --   --  9.3   CO2  --   --   --   --   --  27   BUN  --   --   --   --   --  17   CR  --   --   --   --   --  1.00   * 140* 105* 110*  < > 90   < > = values in this interval not displayed.  CBC  Recent Labs  Lab 06/29/18  1753 06/29/18  1600 06/29/18  1512 06/29/18  1359  06/28/18  1535   WBC 7.1  --   --   --   --  6.9   RBC 2.79*  --   --   --   --  4.18*   HGB 8.5* 9.2* 9.6* 10.1*  < > 12.7*   HCT 25.0*  --   --   --   --  37.9*   MCV 90  --   --   --   --  91   MCH 30.5  --   --   --   --  30.4   MCHC 34.0  --   --   --   --  33.5   RDW 13.6  --   --   --   --  13.2   *  --   --   --   --  191   < > = values in this interval not displayed.  INR  Recent Labs  Lab 06/28/18  1535   INR 0.91 "       A/P: Vincenzo Avery is a 43 year old male with a past medical history of NIC and nephrolithiasis who is now POD#1 s/p transnasal endoscopic excision of pituitary macroadenoma.    Neuro: Pain control per NSG: tylenol prn, oxycodone 10mg q4h prn  HEENT:  - S/p transnasal endoscopic excision of pituitary macroadenoma  - Avoid nasal saline irrigations  - Sinonasal precautions- no nose-blowing or straining; sneeze and cough with mouth open; no IS; no positive-pressure ventilation  - Good bowel regimen  - Monitor for signs of CSF leak  Respiratory:  - Saturating 96+% with face tent 5LPM  - Supplemental O2 PRN to keep sats >92%  CV/heme:  - Hemodynamically stable- tachycardia to 200; BP wnl  - EKG shows NSR with rate in 90s  - Hydralazine, labetalol prn  FEN/GI:  - Regular diet  - Bowel regimen: miralax BID; senna-docusate BID  - Protonix  : Smart in place. Urine output adequate. No concern for DI at this point.   ID: Afebrile    Dispo: per neurosurgery.     -- Patient and above plan discussed with Dr. Peter Johnson MD  Otolaryngology-Head & Neck Surgery  Please contact ENT with questions by dialing [phone] and entering job code [phone] when prompted.

## 2018-06-30 NOTE — PROGRESS NOTES
06/30/18 1100   Quick Adds   Type of Visit Initial Occupational Therapy Evaluation   Living Environment   Lives With significant other   Living Arrangements house   Home Accessibility stairs to enter home;bed and bath on same level;other (see comments)  [facility]   Number of Stairs to Enter Home 5   Number of Stairs Within Home 0   Stair Railings at Home none   Transportation Available family or friend will provide   Self-Care   Dominant Hand left   Usual Activity Tolerance poor   Current Activity Tolerance fair   Regular Exercise no   Equipment Currently Used at Home none   Activity/Exercise/Self-Care Comment ind with ADLs, IADLS except when limiting by migraines   Functional Level Prior   Ambulation 0-->independent   Transferring 0-->independent   Toileting 0-->independent   Bathing 0-->independent   Dressing 0-->independent   Eating 0-->independent   Communication 0-->understands/communicates without difficulty   Swallowing 0-->swallows foods/liquids without difficulty   Cognition 1 - attention or memory deficits   Fall history within last six months no   Which of the above functional risks had a recent onset or change? none   Prior Functional Level Comment independent       Present no   General Information   Onset of Illness/Injury or Date of Surgery - Date 06/29/18   Referring Physician Ben Montoya   Patient/Family Goals Statement home again ASAP   Additional Occupational Profile Info/Pertinent History of Current Problem Mr. Avery is a 43-year-old gentleman with the above 4-year history of headaches followed by visual field cuts that the patient has been experiencing for the last 3-4 months.  He was diagnosed with a large pituitary macroadenoma with optic chiasm compression.  The patient was advised to undergo resection of this mass   Precautions/Limitations other (see comments)  (sinus)   General Observations amb with assist   Cognitive Status Examination    Orientation orientation to person, place and time   Level of Consciousness alert   Able to Follow Commands WNL/WFL   Personal Safety (Cognitive) WNL/WFL   Memory intact   Attention No deficits were identified   Organization/Problem Solving No deficits were identified   Cognitive Comment pt reports noting improvements post op with attention    Visual Perception   Visual Field L eye impaired when migraines present but pt reports it is clearing daily when pain managed   Visual Perception Comments varies with pain in regards to L visual field   Sensory Examination   Sensory Quick Adds No deficits were identified   Pain Assessment   Patient Currently in Pain Yes, see Vital Sign flowsheet   Integumentary/Edema   Integumentary/Edema no deficits were identifed   Range of Motion (ROM)   ROM Quick Adds No deficits were identified   Coordination   Upper Extremity Coordination No deficits were identified   Eating/Self Feeding   Level of [place]: Eating independent   Instrumental Activities of Daily Living (IADL)   Previous Responsibilities medication management;finances;work   Activities of Daily Living Analysis   Impairments Contributing to Impaired Activities of Daily Living pain;post surgical precautions   General Therapy Interventions   Planned Therapy Interventions ADL retraining;IADL retraining;balance training;cognition;transfer training;home program guidelines;progressive activity/exercise   Clinical Impression   Criteria for Skilled Therapeutic Interventions Met yes, treatment indicated   OT Diagnosis ADL deficits   Influenced by the following impairments post op pain and precautions   Assessment of Occupational Performance 3-5 Performance Deficits   Identified Performance Deficits dressing, grooming, toileting, social skills, home management, work   Clinical Decision Making (Complexity) Moderate complexity   Therapy Frequency daily   Predicted Duration of Therapy Intervention (days/wks) 4 days   Anticipated  "Equipment Needs at Discharge (TBD)   Anticipated Discharge Disposition Home with Assist   Risks and Benefits of Treatment have been explained. Yes   Patient, Family & other staff in agreement with plan of care Yes   [facility] TM \"6 Clicks\"   2016, Trustees of [facility], under license to [employer].  All rights reserved.   6 Clicks Short Forms Daily Activity Inpatient Short Form   [facility]  \"6 Clicks\" Daily Activity Inpatient Short Form   1. Putting on and taking off regular lower body clothing? 2 - A Lot   2. Bathing (including washing, rinsing, drying)? 2 - A Lot   3. Toileting, which includes using toilet, bedpan or urinal? 2 - A Lot   4. Putting on and taking off regular upper body clothing? 2 - A Lot   5. Taking care of personal grooming such as brushing teeth? 4 - None   6. Eating meals? 4 - None   Daily Activity Raw Score (Score out of 24.Lower scores equate to lower levels of function) 16   Total Evaluation Time   Total Evaluation Time (Minutes) 20     "

## 2018-06-30 NOTE — PLAN OF CARE
Problem: Patient Care Overview  Goal: Plan of Care/Patient Progress Review  Outcome: Improving  Status: POD#1 s/p stealth assisted endoscopic transnasal resection of pituitary tumor.  VS: Stable except tachycardia intermittently w/ HR as high as 200. MD Kevin was made aware this shift. Fluid bolus NS 1000mL adminstered.  Neuros: Intact except L eye field cut.   GI: No BM this shift. No flatus, BM meds administered in the evening.   : Smart patent, cares completed, output ranged from 175 - 400 cc this shift. MD Kevin was paged.   IV: L PIV infusing w/ NS at 100mL/hr. R PIV SL'd.  Activity: Up w/ A2 & GB.  Pain: c/o of HA, managed w/ PRN Oxycodone  Respiratory/Trach: No shortness of breath   Skin: Samano splint in place; Nasal sling w/ no drainage. denies having metallic taste or CSF leaks  Plan of care: MRI of brain scheduled @ 0800. Urine sample sent for Osmolality & Specific gravity test. Continue w/ POC, strict I&O q1h

## 2018-06-30 NOTE — PLAN OF CARE
Problem: Patient Care Overview  Goal: Plan of Care/Patient Progress Review  Status:POD # 0 s/p Stealth Assisted Endoscopic Transnasal Resection Of Pituitary Tumor.   Neuro:A/O x4,Left visual field cut and neglect.Blurry vision. Move both extremity equally.PERRLA.   Vital:/74, P-82, Sats 99% on 2L Face tent humidify.RR-18, T-97.8  Pain: Headache managed with PRN tylenol 650 mg.   Skin: Samano splint in place.Nasal sling on with no drainage. Denies having metallic taste or CSF leaks back on throat.  Activity: No yet OOB.  Diet: Adequate fluid intake(see flow sheet for I&O).Tolerated clear liquid well please advance him to regular diet in the morning.  GI: Not passing gas yet. Received bowel medications as schedule.  :Smart patent,output at 10pm-500cc,Dr quiñonez paged and order to continue to monitor for the next hour and if >400 please paged him for specific gravity order.  Precaution: Nasal precaution ( no straw, No nose blowing)  Gtt: NS infusing 100cc/hr.PIV SL.  Device: Pneumo boots on while in bed.  Alarm: Bed alarm on. HOB> 30  Plan:Continue to monitor I&O qhr and update the NSG team with changes.

## 2018-06-30 NOTE — ANESTHESIA POSTPROCEDURE EVALUATION
Patient: Vincenzo Avery    Procedure(s):  Stealth Assisted Endoscopic Transnasal Resection Of Pituitary Tumor - Wound Class: II-Clean Contaminated    Diagnosis:Pituitary Tumor   Diagnosis Additional Information: No value filed.    Anesthesia Type:  General, ETT    Note:  Anesthesia Post Evaluation    Patient location during evaluation: PACU  Patient participation: Able to fully participate in evaluation  Level of consciousness: awake and alert  Pain management: adequate  Airway patency: patent  Cardiovascular status: acceptable  Respiratory status: acceptable  Hydration status: acceptable  PONV: none     Anesthetic complications: None          Last vitals:  Vitals:    06/29/18 1930 06/29/18 1945 06/29/18 2000   BP: 111/73 114/77 119/74   Resp:      Temp:      SpO2: 95% 95% 97%         Electronically Signed By: Brice Reynolds MD  June 29, 2018  8:54 PM

## 2018-07-01 ENCOUNTER — APPOINTMENT (OUTPATIENT)
Dept: OCCUPATIONAL THERAPY | Facility: CLINIC | Age: 43
End: 2018-07-01
Attending: OTOLARYNGOLOGY
Payer: COMMERCIAL

## 2018-07-01 LAB
OSMOLALITY SERPL: 296 MMOL/KG (ref 275–295)
OSMOLALITY UR: 414 MMOL/KG (ref 100–1200)
SODIUM SERPL-SCNC: 141 MMOL/L (ref 133–144)
SODIUM SERPL-SCNC: 142 MMOL/L (ref 133–144)
SODIUM UR-SCNC: 115 MMOL/L
SP GR UR STRIP: 1.01 (ref 1–1.03)

## 2018-07-01 PROCEDURE — 25000132 ZZH RX MED GY IP 250 OP 250 PS 637: Performed by: STUDENT IN AN ORGANIZED HEALTH CARE EDUCATION/TRAINING PROGRAM

## 2018-07-01 PROCEDURE — 97530 THERAPEUTIC ACTIVITIES: CPT | Mod: GO

## 2018-07-01 PROCEDURE — 84295 ASSAY OF SERUM SODIUM: CPT | Performed by: INTERNAL MEDICINE

## 2018-07-01 PROCEDURE — 25000128 H RX IP 250 OP 636: Performed by: STUDENT IN AN ORGANIZED HEALTH CARE EDUCATION/TRAINING PROGRAM

## 2018-07-01 PROCEDURE — 83930 ASSAY OF BLOOD OSMOLALITY: CPT | Performed by: STUDENT IN AN ORGANIZED HEALTH CARE EDUCATION/TRAINING PROGRAM

## 2018-07-01 PROCEDURE — 83935 ASSAY OF URINE OSMOLALITY: CPT | Performed by: STUDENT IN AN ORGANIZED HEALTH CARE EDUCATION/TRAINING PROGRAM

## 2018-07-01 PROCEDURE — 36415 COLL VENOUS BLD VENIPUNCTURE: CPT | Performed by: STUDENT IN AN ORGANIZED HEALTH CARE EDUCATION/TRAINING PROGRAM

## 2018-07-01 PROCEDURE — 81003 URINALYSIS AUTO W/O SCOPE: CPT | Performed by: STUDENT IN AN ORGANIZED HEALTH CARE EDUCATION/TRAINING PROGRAM

## 2018-07-01 PROCEDURE — 84295 ASSAY OF SERUM SODIUM: CPT | Performed by: STUDENT IN AN ORGANIZED HEALTH CARE EDUCATION/TRAINING PROGRAM

## 2018-07-01 PROCEDURE — 40000133 ZZH STATISTIC OT WARD VISIT

## 2018-07-01 PROCEDURE — 12000003 ZZH R&B CRITICAL UMMC

## 2018-07-01 PROCEDURE — 36415 COLL VENOUS BLD VENIPUNCTURE: CPT | Performed by: INTERNAL MEDICINE

## 2018-07-01 PROCEDURE — 84300 ASSAY OF URINE SODIUM: CPT | Performed by: STUDENT IN AN ORGANIZED HEALTH CARE EDUCATION/TRAINING PROGRAM

## 2018-07-01 PROCEDURE — 97535 SELF CARE MNGMENT TRAINING: CPT | Mod: GO

## 2018-07-01 RX ORDER — POLYETHYLENE GLYCOL 3350 17 G/17G
17 POWDER, FOR SOLUTION ORAL 3 TIMES DAILY
Status: DISCONTINUED | OUTPATIENT
Start: 2018-07-02 | End: 2018-07-02 | Stop reason: HOSPADM

## 2018-07-01 RX ORDER — AMOXICILLIN 250 MG
3 CAPSULE ORAL 2 TIMES DAILY
Status: DISCONTINUED | OUTPATIENT
Start: 2018-07-02 | End: 2018-07-02 | Stop reason: HOSPADM

## 2018-07-01 RX ADMIN — HYDROCORTISONE 20 MG: 20 TABLET ORAL at 17:07

## 2018-07-01 RX ADMIN — HYDROCORTISONE SODIUM SUCCINATE 50 MG: 100 INJECTION, POWDER, FOR SOLUTION INTRAMUSCULAR; INTRAVENOUS at 03:21

## 2018-07-01 RX ADMIN — OXYCODONE HYDROCHLORIDE 10 MG: 10 TABLET ORAL at 01:15

## 2018-07-01 RX ADMIN — HYDROCORTISONE SODIUM SUCCINATE 50 MG: 100 INJECTION, POWDER, FOR SOLUTION INTRAMUSCULAR; INTRAVENOUS at 12:26

## 2018-07-01 RX ADMIN — OXYCODONE HYDROCHLORIDE 10 MG: 10 TABLET ORAL at 04:39

## 2018-07-01 RX ADMIN — SENNOSIDES AND DOCUSATE SODIUM 2 TABLET: 8.6; 5 TABLET ORAL at 20:13

## 2018-07-01 RX ADMIN — ONDANSETRON 4 MG: 2 INJECTION INTRAMUSCULAR; INTRAVENOUS at 09:13

## 2018-07-01 RX ADMIN — OXYCODONE HYDROCHLORIDE 10 MG: 10 TABLET ORAL at 12:26

## 2018-07-01 RX ADMIN — OXYCODONE HYDROCHLORIDE 10 MG: 10 TABLET ORAL at 23:43

## 2018-07-01 RX ADMIN — ACETAMINOPHEN 650 MG: 325 TABLET, FILM COATED ORAL at 03:20

## 2018-07-01 RX ADMIN — OXYCODONE HYDROCHLORIDE 10 MG: 10 TABLET ORAL at 09:04

## 2018-07-01 RX ADMIN — OXYCODONE HYDROCHLORIDE 10 MG: 10 TABLET ORAL at 20:13

## 2018-07-01 RX ADMIN — ONDANSETRON 4 MG: 2 INJECTION INTRAMUSCULAR; INTRAVENOUS at 04:40

## 2018-07-01 RX ADMIN — OXYCODONE HYDROCHLORIDE 10 MG: 10 TABLET ORAL at 15:25

## 2018-07-01 ASSESSMENT — VISUAL ACUITY
OU: NORMAL ACUITY

## 2018-07-01 NOTE — PLAN OF CARE
Problem: Patient Care Overview  Goal: Plan of Care/Patient Progress Review  Status:POD # 1 s/p Stealth Assisted Endoscopic Transnasal Resection Of Pituitary Tumor.   Neuro:A/O x4,Left eye goes blind( NSG team aware) and Right eye vision seems to be improving per patient.Move both extremity equally.PERRLA.   Vital:/77, P-90, RR 18, Temp-98.7, sats 98% on RA.  Pain: Headache managed with PRN tylenol 650 mg and oxycodone 10mg Q 3hrs.   Skin: Samano splint in place.Nasal sling on with moderate serosanguinous drainage. Denies having metallic taste. No CSF leaks noted or reported.  Activity:Sat at the edge of bed once.  Diet: Adequate fluid intake(see flow sheet for I&O).Patient ate 100% of supper.  GI: patient had 100cc emesis this evening, Zofran 8mg IV given with relief. Passing gas. No BM yet and refused bowel medication d/t nausea.   :Smart patent,output range 100-250cc.  Precaution: Nasal precaution ( no straw, No nose blowing)  Gtt: PIV SLx2  Device: Pneumo boots on while in bed.  Alarm: Bed alarm on. HOB> 30  Plan:Continue to monitor I&O Q hr and update the NSG team with changes.

## 2018-07-01 NOTE — PLAN OF CARE
"Problem: Patient Care Overview  Goal: Plan of Care/Patient Progress Review  OT6A:   Discharge Planner OT   Patient plan for discharge: Home   Current status: Pt completed in bed mobility SBA. Pt sit<>Stand SBA, ambulated ~400' SBA no AD/AE and no LOB. Pt demonstrated ability to ascend/descned x8 steps no LOB, SBA. Pt completed standing self cares at sink, SBA. Pt endorsing he is still sensitive to light, however able to \"push throughout it.\" Pt demonstrating good adherence to post surgical precautions throughout session   Barriers to return to prior living situation: medical status   Recommendations for discharge: Home with HEP   Rationale for recommendations: Anticipate by time pt medically ready to d/c pt will have all goals met and safe to d/c home with HEP to progress strength and endurance          Entered by: Geeta Diez 07/01/2018 1:45 PM           "

## 2018-07-01 NOTE — PLAN OF CARE
Problem: Patient Care Overview  Goal: Plan of Care/Patient Progress Review  Outcome: Improving  Status: POD#2 s/p stealth assisted endoscopic transnasal resection of pituitary tumor.  VS: Stable. A&O x4  Neuros: Intact except L eye field cut & blurriness to L eye.   GI: Pt has 240 mL emesis this shift. IV Zofran x1 administered. No BM this shift, passing gas.   : Smart patent, cares completed, output ranged from 100 - 350mL. Urine sample sent for osmolality, sodium, & specific gravity.  IV: PIVs SL'd.  Activity: Up w/ A1 & GB.  Pain: c/o of HA, managed w/ PRN Oxycodone & Tylenol   Respiratory/Trach: No shortness of breath   Skin: Samano splint in place; Nasal sling w/ small amount of drainage. Denies having metallic taste or CSF leaks  Plan of care: Look out for lab results this AM. Strict I&O. HOB >30 degree. Nasal precautions. Continue to monitor & POC.

## 2018-07-01 NOTE — PROGRESS NOTES
"Otolaryngology Progress Note  July 1, 2018    S: Patient with increasing headache and emesis yesterday. STAT Head CT was obtained, which revealed no acute intracranial pathology. Endocrinology was consulted on 06/30/2018 for increased UOP. Patient received one dose of DDAVP yesterday for increased UOP.    Subjectively, patient reports bilateral periorbital headaches that are improving. He states that his right-sided vision remains good and that his left is improving. Patient reports minimal blood-tinged anterior nasal drainage. Patient denies salty and metallic taste in his mouth.    O: /72 (BP Location: Left arm)  Pulse 76  Temp 97.5  F (36.4  C) (Axillary)  Resp 18  Ht 1.753 m (5' 9\")  Wt 92.9 kg (204 lb 12.9 oz)  SpO2 98%  BMI 30.24 kg/m2   General: ambulating comfortably in room; not in acute distress   HEENT: no anterior nasal bleeding or drainage; coagulated blood in bilateral nares; no postnasal drainage   Pulmonary: breathing comfortably on room air; no stridor    Intake/Output Summary (Last 24 hours) at 06/30/18 0631  Last data filed at 06/30/18 0510   Gross per 24 hour   Intake          6410.33 ml   Output             4191 ml   Net          2219.33 ml     LABS:  ROUTINE IP LABS (Last four results)  BMP  Recent Labs  Lab 07/01/18  0525 06/30/18  2135 06/30/18  1745 06/30/18  1216 06/30/18  0646 06/29/18  1600 06/29/18  1512 06/29/18  1359  06/28/18  1535    145* 145* 147* 146* 140 140 140  < > 141   POTASSIUM  --   --   --   --  4.0 4.1 4.2 3.3*  < > 4.0   CHLORIDE  --   --   --   --  114*  --   --   --   --  108   KANA  --   --   --   --  8.5  --   --   --   --  9.3   CO2  --   --   --   --  23  --   --   --   --  27   BUN  --   --   --   --  15  --   --   --   --  17   CR  --   --   --   --  0.89  --   --   --   --  1.00   GLC  --   --   --   --  138* 136* 140* 105*  < > 90   < > = values in this interval not displayed.  CBC  Recent Labs  Lab 06/30/18  0646 06/29/18  1753 06/29/18  1600 " 06/29/18  1512  06/28/18  1535   WBC 10.4 7.1  --   --   --  6.9   RBC 2.53* 2.79*  --   --   --  4.18*   HGB 7.6* 8.5* 9.2* 9.6*  < > 12.7*   HCT 22.4* 25.0*  --   --   --  37.9*   MCV 89 90  --   --   --  91   MCH 30.0 30.5  --   --   --  30.4   MCHC 33.9 34.0  --   --   --  33.5   RDW 13.9 13.6  --   --   --  13.2    148*  --   --   --  191   < > = values in this interval not displayed.  INR    Recent Labs  Lab 06/28/18  1535   INR 0.91       A/P: Vincenzo Avery is a 43-year-old male with a past medical history of NIC and nephrolithiasis who is now POD#2 s/p transnasal endoscopic excision of pituitary macroadenoma. Patient is doing better today and is recovering well postoperatively. There is concern for DI, for which labs are currently pending.    Neuro: Pain control per NSG: tylenol prn, oxycodone 10mg q4h prn    HEENT:  - S/p transnasal endoscopic excision of pituitary macroadenoma  - Avoid nasal saline irrigations  - Sinonasal precautions- no nose-blowing or straining; sneeze and cough with mouth open; no IS; no positive-pressure ventilation  - Good bowel regimen  - Monitor for signs of CSF leak    Respiratory:  - Saturating 96+% with face tent 5LPM  - Supplemental O2 PRN to keep sats >92%    CV/heme:  - Hemodynamically stable  - Hydralazine, labetalol prn    FEN/GI:  - Regular diet  - Bowel regimen: miralax BID; senna-docusate BID  - Protonix    :  - Smart catheter in place for strict I/Os  - UOP 300cc and 350cc consecutive hours this morning   - Sodium, urine osmolality, and urine specific gravity pending    ID:  - Afebrile  - WBC wnl    Dispo: per neurosurgery.     -- Patient and above plan discussed with Dr. Siegel-Dk Smith MD  Otolaryngology-Head & Neck Surgery, PGY-2  Please contact ENT with questions by dialing * [phone] and entering job code [phone] when prompted.

## 2018-07-01 NOTE — OP NOTE
Procedure Date: 06/29/2018      DATE OF OPERATION: 06/29/2018      PREOPERATIVE DIAGNOSES:     1.  Large pituitary macroadenoma.   2.  Vision loss secondary to #1.      POSTOPERATIVE DIAGNOSES:     1.  Large pituitary macroadenoma.    2.  Vision loss secondary to #1.      TITLE OF PROCEDURES:   1.  Endoscopic transnasal approach for resection of pituitary macroadenoma.   2.  Intraoperative use of frameless stereotactic neuronavigation.      ANESTHESIA:  GETA.      ATTENDING SURGEON:  Rolf Neely MD      RESIDENT SURGEONS:  Jose Luis Guthrie MD      HISTORY OF PRESENT ILLNESS:  Mr. Avery is a 43-year-old left-handed man with approximately 2 years of peripheral vision loss that has worsened in the past few months.  His visual acuity has also declined and formal examination shows bitemporal hemianopsia.  Imaging studies reveal a very large tumor in the sella with suprasellar and right parasellar extension.  He presents for resection of this likely pituitary adenoma through an endoscopic transnasal approach.      DESCRIPTION OF PROCEDURE:  Upon intubation and induction of general anesthesia, the patient was placed in supine position on the operating table.  The Frankel headholder was applied and his neck was mildly extended.  Scalp fiducials were registered and frameless stereotactic neuronavigation was established using the Stealth system.  Electrodes for SSEP and EEG monitoring were established by the [facility] staff.  Baseline recordings were obtained.  Please see Dr. Siegel's operative report regarding details of the endoscopic transnasal approach to the sphenoid sinus.  The tumor had expanded the floor of the sella and eroded into the sphenoid sinus.  Upon entering the sphenoid sinus, we could see the eroded dura as well as the tumor.  We returned at this point.      The thinned sellar floor was resected using curettes and Kerrison punches.  The dura was already eroded by tumor.  We expanded the dural  opening with bayoneted #11 blades in a stellate fashion.  There was soft tumor that spontaneously herniated through the dural opening.  We used a combination of ring curettes and suction to resect the tumor.  Specimens were sent for permanent pathology.  We focused inferiorly towards the clival recess.  The tumor was quite vascular and there was diffuse bleeding from the tumor as well as the extradural compartment.  Tumor removal was facilitated by the soft consistency, but was still tedious due to the need to obtain intermittent hemostasis.  After removing the inferior aspect of the tumor all the way posteriorly to the dorsum sellae, we turned our attention to the left lateral aspect of the tumor.  After removing this tumor, we identified the medial wall of the parasellar space.  There was some cavernous sinus bleeding that was controlled with tamponade and Gelfoam slurry.  We then resected tumor from the right parasellar space.  We used the neuronavigation intermittently, we checked the position of the internal carotid artery as the tumor had filled the right parasellar space and encased the carotid artery.  We identified the artery with the endoscope and confirmed its position with the neuronavigation.  We then removed tumor anterior and posterior to the artery.  The most lateral component of the tumor on the right side was difficult to remove due to the carotid artery's position.  We used angled suctions and long side angle curettes.  We believed that we left some residual lateral to the internal carotid artery on the right side.  We then worked superiorly.  There was much tumor here with several sequestered pockets.  We removed tumor from all these areas.  We encountered the wall of the cyst within the tumor.  We exposed more of this cyst by removing more tumor circumferentially.  We found the cyst wall was actually an invaginated, trapped arachnoid membrane as it was continuous with the rest of the arachnoid.   Therefore, we decided not to puncture this structure.  Nonetheless, there was a very small CSF leak from the most inferior part of the descended arachnoid.  As expected, the bleeding improved once the vast majority of the tumor had been resected.  There was still bleeding from the right parasellar space and this was ultimately controlled with Gelfoam slurry, hydrogen peroxide and tamponade.  Once we determined that maximal tumor resection was completed, the wound was irrigated.  The dural defect was closed using a Durepair graft in an onlay fashion with respect to the dura and inlay with respect to the bone edges.  There was some residual sphenoid sinus mucosa that was now visible since the tumor was no longer obstructing our view.  All these mucosal fragments were removed.  Please see Dr. Siegel's operative report regarding details of the closure.        At the end of the procedure, the headholder was removed and the patient was extubated and transported to the recovery room without incident.  Blood loss was approximately 1 liter.  Sponge and needle counts were correct at the end of the case.        I was present for the entire neurosurgical portion and immediately available for the entire operation.        Please note that resection of this tumor was complex due to its vascularity and size.  These features added an extra 20% time and effort to the operation.         JANUSZ CHADWICK MD             D: 2018   T: 2018   MT:       Name:     TAMMY OMER   MRN:      [id number]        Account:        [id number]   :      1975           Procedure Date: 2018      Document: [id number]

## 2018-07-01 NOTE — PLAN OF CARE
"Problem: Patient Care Overview  Goal: Plan of Care/Patient Progress Review  Outcome: Improving  Pt. Is POD #2 s/p transnasal endoscopic excision of pituitary macroadenoma.  AVSS.  Neuros intact except for L. Field cut.  Pt. States that bilateral vision has improved since yesterday when it \"went dark\" in the L. Eye. Scant amount of serosanguineous output from the R. nare throughout the day; no nasal sling dressing needed. Pt. Had one episode of nausea this am where he needed Zofran IV x 1, no emesis.  Pt. Is tolerating a regular diet and has increased his PO intake.  Smart removed @ 1015 and pt. Is now voiding spontaneously.  Pt. Is up independently in his room and states that he feels much better today.  Pt.'s states his headache is tolerable with Oxycodone q3hrs.  Pt. Will likely dc home tomorrow.  Continue to closely monitor I & Os and notify MD with any significant changes in status.      "

## 2018-07-01 NOTE — PROGRESS NOTES
Neurosurgery progress Note     S: Headache, but vision significantly improved in right visual field.      O:  Exam:  General: Awake;  Alert, In No Acute Distress  Pulm: Breathing Comfortably on RA  Mental status: Oriented x 3  Cranial Nerves: Chronic vision loss in left eye, otherwise cranial Nerves II-XII Intact Bilaterally  Strength:5/5 throughout   Pronator Drift: Absent  Sensory: Intact to Light Touch    A: Doing well post-operatively.     P:  Operation: Status post endoscopic transnasal resection of pituitary tumor  Imaging: post operative MRI completed, good post-op resection demonstrated   Pain: pain controlled  Endocrine: Cortef taper, endocrinology consult    Blood pressure goals: SBP < 140   Diet: advance diet as tolerated   Hematological goals: Platelets > 100k, INR < 1.5, Hemoglobin > 7   PT/OT: pending  DVT prophylaxis: Sequential compression devices  Ulcer prophylaxis: none indicated  DISPO:  Anticipate D/C Home 7/3  Barriers: evaluation by therapies, ambulating, BM/flatus, voiding without a Smart, tolerating PO diet    Maia Morton MD  Neurosurgery PGY3

## 2018-07-01 NOTE — PROGRESS NOTES
Endocrinology Consult Daily Progress Note    Assessment/Plan:    Patient is a 43 year old male with a history of kidney stones, NIC, and migraines who was admitted for pituitary mass resection.     Monitor for DI: Received low dose DDAVP SQ 0.5 mcg yesterday afternoon but has not needed any since. Sodium has stabilized and he is drinking to thirst. Urine output slightly high overnight so urine studies sent. Doubt he will need any at d/c.     Recs:  -Assuming urine studies are ok and 2PM sodium is stable, ok to discharge from endo standpoint  -Should monitor his intake and output at discharge, call if high urine volumes  -Sodium check recommended on Tuesday, 7/3/2018 up in [place]    -Needs endocrinologist follow-up in [place] within 1 month of d/c or we can see him if he comes back to see neurosurgery in follow-up      Monitor for adrenal insufficiency: Macroadenoma of this size undergoing resection at risk of secondary adrenal insufficiency. He had a documented low cortisol on 4/30/2018 but this was at 4:10 pm. He is currently on a steroid taper per neurosurgery.   Recs:  recommend a discharge dose of 20/10 hydrocortisone until follow-up with endocrine as an outpatient.       Monitor for central hypothyroidism, TSH 1.37, free T4 0.75:  TFTs may be sign of early central hypothyroidism.  Would repeat levels 4-6 weeks post-op.    Patient was discussed with Dr. Ortiz.    Tyler Donis MD   Endocrinology Fellow  Pager: [phone]    Interval History:      Still has migraine but maybe slightly better. Vision in left eye remains poor at times. Eating and drinking. Feels he is urinating about how he normally does. Does feel too thirsty. Drinking a fair amount of water.        Active Diet Order      Regular Diet Adult      Exam:      B/P: 127/72, T: 97.5, P: 76, R: 18    General: NAD.   HEENT: NC/AT, mucous membranes are moist.  Resp: Unlabored breathing.   Neuro: Alert and oriented, communicating clearly.   Ext: no  swelling or edema  Data:        Recent Labs  Lab 06/30/18  0646 06/29/18  1600 06/29/18  1512 06/29/18  1359 06/29/18  1320 06/29/18  1231 06/29/18  0642   * 136* 140* 105* 110* 123*  --    BGM  --   --   --   --   --   --  106*     No results found for: A1C        Attending Note:     Patient was seen and examined with fellow Dr. Donis    The note reflects our mutual findings and plan.     Manny Ortiz MD  0115  Endocrinology Service

## 2018-07-02 ENCOUNTER — APPOINTMENT (OUTPATIENT)
Dept: OCCUPATIONAL THERAPY | Facility: CLINIC | Age: 43
End: 2018-07-02
Attending: OTOLARYNGOLOGY
Payer: COMMERCIAL

## 2018-07-02 ENCOUNTER — TELEPHONE (OUTPATIENT)
Dept: OTOLARYNGOLOGY | Facility: CLINIC | Age: 43
End: 2018-07-02

## 2018-07-02 VITALS
HEART RATE: 87 BPM | OXYGEN SATURATION: 99 % | SYSTOLIC BLOOD PRESSURE: 127 MMHG | BODY MASS INDEX: 30.33 KG/M2 | TEMPERATURE: 97 F | DIASTOLIC BLOOD PRESSURE: 71 MMHG | WEIGHT: 204.81 LBS | RESPIRATION RATE: 18 BRPM | HEIGHT: 69 IN

## 2018-07-02 LAB
INTERPRETATION ECG - MUSE: NORMAL
SODIUM SERPL-SCNC: 140 MMOL/L (ref 133–144)

## 2018-07-02 PROCEDURE — 25000128 H RX IP 250 OP 636: Performed by: STUDENT IN AN ORGANIZED HEALTH CARE EDUCATION/TRAINING PROGRAM

## 2018-07-02 PROCEDURE — 25000132 ZZH RX MED GY IP 250 OP 250 PS 637: Performed by: STUDENT IN AN ORGANIZED HEALTH CARE EDUCATION/TRAINING PROGRAM

## 2018-07-02 PROCEDURE — 40000133 ZZH STATISTIC OT WARD VISIT

## 2018-07-02 PROCEDURE — 84295 ASSAY OF SERUM SODIUM: CPT | Performed by: INTERNAL MEDICINE

## 2018-07-02 PROCEDURE — 97530 THERAPEUTIC ACTIVITIES: CPT | Mod: GO

## 2018-07-02 PROCEDURE — 36415 COLL VENOUS BLD VENIPUNCTURE: CPT | Performed by: INTERNAL MEDICINE

## 2018-07-02 RX ORDER — HYDROCORTISONE 20 MG/1
20 TABLET ORAL EVERY MORNING
Qty: 30 TABLET | Refills: 3 | Status: SHIPPED | OUTPATIENT
Start: 2018-07-03 | End: 2021-03-16

## 2018-07-02 RX ORDER — PANTOPRAZOLE SODIUM 40 MG/1
40 TABLET, DELAYED RELEASE ORAL
Qty: 30 TABLET | Refills: 3 | Status: SHIPPED | OUTPATIENT
Start: 2018-07-03 | End: 2021-03-16

## 2018-07-02 RX ORDER — ECHINACEA PURPUREA EXTRACT 125 MG
1 TABLET ORAL DAILY PRN
Qty: 15 ML | Refills: 0 | Status: SHIPPED | OUTPATIENT
Start: 2018-07-02 | End: 2021-03-16

## 2018-07-02 RX ORDER — OXYCODONE HYDROCHLORIDE 10 MG/1
5-10 TABLET ORAL
Qty: 60 TABLET | Refills: 0 | Status: SHIPPED | OUTPATIENT
Start: 2018-07-02 | End: 2021-03-16

## 2018-07-02 RX ORDER — HYDROCORTISONE 10 MG/1
10 TABLET ORAL EVERY EVENING
Qty: 30 TABLET | Refills: 3 | Status: SHIPPED | OUTPATIENT
Start: 2018-07-02 | End: 2021-03-16

## 2018-07-02 RX ADMIN — SENNOSIDES AND DOCUSATE SODIUM 3 TABLET: 8.6; 5 TABLET ORAL at 07:47

## 2018-07-02 RX ADMIN — ACETAMINOPHEN 650 MG: 325 TABLET, FILM COATED ORAL at 04:26

## 2018-07-02 RX ADMIN — PANTOPRAZOLE SODIUM 40 MG: 40 TABLET, DELAYED RELEASE ORAL at 07:47

## 2018-07-02 RX ADMIN — HYDROCORTISONE 20 MG: 20 TABLET ORAL at 07:47

## 2018-07-02 RX ADMIN — OXYCODONE HYDROCHLORIDE 10 MG: 10 TABLET ORAL at 09:25

## 2018-07-02 RX ADMIN — OXYCODONE HYDROCHLORIDE 10 MG: 10 TABLET ORAL at 04:18

## 2018-07-02 RX ADMIN — OXYCODONE HYDROCHLORIDE 10 MG: 10 TABLET ORAL at 12:19

## 2018-07-02 RX ADMIN — ONDANSETRON 4 MG: 2 INJECTION INTRAMUSCULAR; INTRAVENOUS at 05:31

## 2018-07-02 ASSESSMENT — VISUAL ACUITY: OU: NORMAL ACUITY

## 2018-07-02 NOTE — PLAN OF CARE
Problem: Patient Care Overview  Goal: Plan of Care/Patient Progress Review  Status:POD # 2 s/p Stealth Assisted Endoscopic Transnasal Resection Of Pituitary Tumor.   Neuro:A/O x4,Left eye field cut and Right eye vision seems to be improving per patient.Move both extremity equally.PERRLA.   Vital:stable.  Pain: Headache managed with PRN oxycodone 10mg Q 3hrs.   Skin: Samano splint in place.Nasal sling on with small serosanguinous drainage. Denies having metallic taste. No CSF leaks noted or reported.  Activity:ambulate in room, halls and bathroom  Diet: Adequate fluid intake.  GI: denies having nausea. Had BM.    :Voiding spontaneously.  Precaution: Nasal precaution ( no straw, No nose blowing)  Gtt: PIV SL  Device: Pneumo boots on while in bed.  Alarm: Bed alarm on. HOB> 30  Plan:Continue to monitor I&O closely and update the NSG team with changes.Possible discharge tomorrow.

## 2018-07-02 NOTE — PROGRESS NOTES
"Otolaryngology Progress Note  July 2, 2018    S: Patient had no acute events overnight. Episode of nausea without vomiting controlled with zofran. Denies any signs or symptoms of a CSF leak.     O: /71 (BP Location: Left arm)  Pulse 87  Temp 97  F (36.1  C) (Axillary)  Resp 18  Ht 1.753 m (5' 9\")  Wt 92.9 kg (204 lb 12.9 oz)  SpO2 99%  BMI 30.24 kg/m2    General: ambulating comfortably in room; not in acute distress   HEENT: no anterior nasal bleeding or drainage; coagulated blood in bilateral nares; no postnasal drainage   Pulmonary: breathing comfortably on room air; no stridor    Intake/Output Summary (Last 24 hours) at 07/02/18 0902  Last data filed at 07/02/18 0750   Gross per 24 hour   Intake             3595 ml   Output             2800 ml   Net              795 ml     LABS:  ROUTINE IP LABS (Last four results)  BMP  Recent Labs  Lab 07/02/18  0648 07/01/18  1340 07/01/18  0525 06/30/18  2135  06/30/18  0646 06/29/18  1600 06/29/18  1512 06/29/18  1359  06/28/18  1535    141 142 145*  < > 146* 140 140 140  < > 141   POTASSIUM  --   --   --   --   --  4.0 4.1 4.2 3.3*  < > 4.0   CHLORIDE  --   --   --   --   --  114*  --   --   --   --  108   KANA  --   --   --   --   --  8.5  --   --   --   --  9.3   CO2  --   --   --   --   --  23  --   --   --   --  27   BUN  --   --   --   --   --  15  --   --   --   --  17   CR  --   --   --   --   --  0.89  --   --   --   --  1.00   GLC  --   --   --   --   --  138* 136* 140* 105*  < > 90   < > = values in this interval not displayed.  CBC  Recent Labs  Lab 06/30/18  0646 06/29/18  1753 06/29/18  1600 06/29/18  1512  06/28/18  1535   WBC 10.4 7.1  --   --   --  6.9   RBC 2.53* 2.79*  --   --   --  4.18*   HGB 7.6* 8.5* 9.2* 9.6*  < > 12.7*   HCT 22.4* 25.0*  --   --   --  37.9*   MCV 89 90  --   --   --  91   MCH 30.0 30.5  --   --   --  30.4   MCHC 33.9 34.0  --   --   --  33.5   RDW 13.9 13.6  --   --   --  13.2    148*  --   --   --  191   < > " = values in this interval not displayed.  INR    Recent Labs  Lab 06/28/18  1535   INR 0.91       A/P: Vincenzo Avery is a 43-year-old male with a past medical history of NIC and nephrolithiasis who is now POD#3 s/p transnasal endoscopic excision of pituitary macroadenoma.     Neuro: Pain control per NSG: tylenol prn, oxycodone 10mg q4h prn    HEENT:  - S/p transnasal endoscopic excision of pituitary macroadenoma  - Order nasal saline irrigations on discharge, none prior  - Sinonasal precautions- no nose-blowing or straining; sneeze and cough with mouth open; no IS; no positive-pressure ventilation  - Good bowel regimen  - Monitor for signs of CSF leak    Respiratory:  - No issues  - Supplemental O2 PRN to keep sats >92%    CV/heme:  - Hemodynamically stable- P and BP wnl  - Hydralazine, labetalol prn    FEN/GI:  - Regular diet  - Bowel regimen: miralax BID; senna-docusate BID  - Protonix    /Endo:  - Smart discontinued- voiding independently  - DI watch per neurosurgery and endocrinology    ID:  - Afebrile  - No signs or sxs of infection    -- Patient and above plan discussed with Dr. Green .    Juma Vicente MD PGY4  Otolaryngology-Head & Neck Surgery  Please contact ENT with questions by dialing * [phone] and entering job code [phone] when prompted.

## 2018-07-02 NOTE — PLAN OF CARE
Problem: Patient Care Overview  Goal: Plan of Care/Patient Progress Review  PT6A: Pt deferred from PT, decision made prior to discharge as pt was IND with all functional mobility per OT and all pt's therapy needs met by OT.

## 2018-07-02 NOTE — PROGRESS NOTES
"S: Headache and vision stable.    O:  Exam:  General: Awake;  Alert, In No Acute Distress  Pulm: Breathing Comfortably on RA  Mental status: Oriented x 3  Cranial Nerves: Chronic vision loss in left eye, otherwise cranial Nerves II-XII Intact Bilaterally  Strength:5/5 throughout   Pronator Drift: Absent  Sensory: Intact to Light Touch    A: Doing well post-operatively.     P:  Operation: Status post endoscopic transnasal resection of pituitary tumor  Imaging: post operative MRI completed, good post-op resection demonstrated   Pain: pain controlled  Endocrine: Cortef taper, endocrinology consult    Blood pressure goals: SBP < 140   Diet: advance diet as tolerated   Hematological goals: Platelets > 100k, INR < 1.5, Hemoglobin > 7   PT/OT: home w/ HEP (OT)  DVT prophylaxis: Sequential compression devices  Ulcer prophylaxis: none indicated  DISPO:  Anticipate D/C Home 7/2  Barriers: physical therapy evaluation    Morgan \"Sudhir\" MD [facility]   Neurosurgery, PGY-1    Please contact neurosurgery resident on call with questions.    Dial * [phone], enter [phone] when prompted.       I have reviewed the history above and agree with the resident's assessment and plan.  ODALYS Neely MD    "

## 2018-07-02 NOTE — PLAN OF CARE
Problem: Patient Care Overview  Goal: Plan of Care/Patient Progress Review  Discharge Planner OT   Patient plan for discharge: home  Current status: Pt currently performing all mobility and ADLs independently, completed ~600 feet of ambulation in hallway with SBA and no LOB, steady on feet overall. Pt reports improved vision and reduced migraines, does not endorse any concerns with returning home from functional standpoint at this time. Pt has adequate support from SO and family, able to recall nasal precautions without difficulty.   Barriers to return to prior living situation: none  Recommendations for discharge: home with assist as needed   Rationale for recommendations: Assist as needed for heavier IADLs (cleaning, laundry, driving).        Entered by: Alis Jordan 07/02/2018 10:35 AM         Occupational Therapy Discharge Summary    Reason for therapy discharge:    Discharged to home.    Progress towards therapy goal(s). See goals on [facility] Plan [facility] electronic health record for goal details.  Goals met    Therapy recommendation(s):    No further therapy is recommended.

## 2018-07-02 NOTE — PLAN OF CARE
Problem: Patient Care Overview  Goal: Plan of Care/Patient Progress Review  Outcome: Improving  Status: POD #3 s/p Stealth Assisted Endoscopic Transnasal Resection Of Pituitary Tumor. (TSS)  Neuro: A&O x4, Left eye field cut, Left vision more clear now. Right eye vision improving per patient. PERRLA.   Vital: stable.  Pain: HA managed w/ PRN oxycodone 10mg.   Skin: Samano splint in place. Nasal sling on with small serosanguinous drainage. Denies having metallic taste. No CSF leaks noted or reported.  Activity: Up independent, ambulated to bathroom  Diet: Adequate fluid intake. Good appetite per pt.  GI: Pt had emesis after taking PRN Oxycodone, IV 4mg Zofran x1. No BM this shift. ?  : Voiding spontaneously. MD paged d/t urine output >200cc for 2 hrs.   Precautions: Nasal precaution (No straw, No nose blowing)  Gtt: PIV SL'D  Alarm: Bed alarm on. HOB> 30  Plan: Continue to monitor I&O every 2 hr & update the NSG team with changes. Possible d/c today.

## 2018-07-02 NOTE — PLAN OF CARE
Problem: Patient Care Overview  Goal: Plan of Care/Patient Progress Review  Outcome: Improving  Status: POD #3 s/p Stealth Assisted Endoscopic Transnasal Resection Of Pituitary Tumor.   Neuro: A&O x4, Left eye field cut, Left vision more clear now. Right eye vision improving per patient. PERRLA.   Vital: stable.  Pain: HA managed w/ PRN oxycodone 10mg.   Skin: Samano splint in place. Nasal sling on with small serosanguinous drainage. Denies having metallic taste. No CSF leaks noted or reported.  Activity: Up independent, ambulated to bathroom  Diet: Adequate fluid intake. Good appetite per pt.  GI: Denies nausea. No BM this shift. ?  : Voiding spontaneously.  Precautions: Nasal precaution (No straw, No nose blowing)  Gtt: PIV SL'D  Alarm: Bed alarm on. HOB> 30  Plan: Continue to monitor I&O every 2 hr & update the NSG team with changes. Possible d/c tomorrow.

## 2018-07-02 NOTE — DISCHARGE SUMMARY
[facility] Discharge Summary and Instructions    Vincenzo Avery MRN# [id number]   Age: 43 year old YOB: 1975     Date of Admission:  6/29/2018  Date of Discharge::  7/2/2018  1:22 PM  Admitting Physician:  Rolf Neely MD  Discharge Physician:  Rolf Neely MD          Admission Diagnoses:   Pituitary Tumor           Discharge Diagnosis:   Pituitary Tumor           Procedures:   6/29: stealth assisted endoscopic transnasal resection of pituitary tumor, possible lumbar drain insertion            Brief History of Illness:   Mr. Avery is a 43-year-old left-handed man with approximately 2 years of peripheral vision loss that has worsened in the past few months.  His visual acuity has also declined and formal examination shows bitemporal hemianopsia.  Imaging studies reveal a very large tumor in the sella with suprasellar and right parasellar extension.  He presents for resection of this likely pituitary adenoma through an endoscopic transnasal approach.            Hospital Course:   Patient underwent above-mentioned procedure on 6/29. The operation was uncomplicated and he was admitted to the floor for routine post-operative cares. On post operative day 1, he was ambulating, voiding without a jaeger, eating a regular diet, pain was well controlled. He received 1 dose of DDAVP 0.5mg on 6/30 and did not require further doses. His sodium stabilized post-operatively. Endocrinology team was following and recommended sodium check on 7/3/18 with follow-up in 1 month. He was also discharged on hydrocortisone 20/10 until follow-up.  ENT team also followed the patient and they put instructions for discharge.          Discharge Medications:     Discharge Medication List as of 7/2/2018 12:39 PM      START taking these medications    Details   !! hydrocortisone (CORTEF) 10 MG tablet Take 1 tablet (10 mg) by mouth every evening, Disp-30 tablet, R-3, E-Prescribe      !!  hydrocortisone (CORTEF) 20 MG tablet Take 1 tablet (20 mg) by mouth every morning, Disp-30 tablet, R-3, E-Prescribe      oxyCODONE IR (ROXICODONE) 10 MG tablet Take 0.5-1 tablets (5-10 mg) by mouth every 3 hours as needed for moderate to severe pain, Disp-60 tablet, R-0, Local Print      pantoprazole (PROTONIX) 40 MG EC tablet Take 1 tablet (40 mg) by mouth every morning (before breakfast), Disp-30 tablet, R-3, E-Prescribe      sodium chloride (OCEAN NASAL SPRAY) 0.65 % nasal spray Spray 1 spray into both nostrils daily as needed for congestion, Disp-15 mL, R-0, E-Prescribe       !! - Potential duplicate medications found. Please discuss with provider.      STOP taking these medications       Aspirin-Acetaminophen-Caffeine (EXCEDRIN MIGRAINE PO) Comments:   Reason for Stopping:                       Discharge Instructions and Follow-Up:   Discharge diet: Regular   Discharge activity: activity as tolerated. No blowing nose, no straws, sneeze with mouth open   Discharge follow-up: Follow-up with ENT Dr Siegel in 1-2 weeks.  Follow-up with endocrinology Dr. Gamboa in 1-2 weeks Follow-up with NSG Dr. Neely in 3 months with MRI brain w/wo contrast (pituitary protocol)   Wound care: Ok to shower,however no scrubbing of the wound and no soaking of the wound, meaning no bathtubs or swimming pools. Pat dry only. Leave wound open to air.  Sutures are not absorbable and need to be removed in 2 weeks. If patient still at rehab by this time, the sutures may be removed by the rehab physician if he or she considers that the wound has healed completely.     Please call if you have:  1. increased pain, redness, drainage, swelling at your incision  2. fevers > 101.5 F degrees  3. with any questions or concerns.  You may reach the Neurosurgery clinic at [phone] during regular work hours. ER at [phone].    and ask for the Neurosurgery Resident on call at [phone], during off hours or weekends.         Discharge  Disposition:   Discharged to home          -----------------------------------  Ayden Campoverde MD, MS  Neurosurgery PGY-1  Pager #[phone]

## 2018-07-02 NOTE — PLAN OF CARE
Problem: Patient Care Overview  Goal: Plan of Care/Patient Progress Review  Outcome: Adequate for Discharge Date Met: 07/02/18  Pt POD#3 TSS, vss, neuros include: a/o x4, L cut, L vision slightly blurry but improved since surgery. PIV removed per d/c orders, regular diet, voiding spont, passing gas, hypoactive bowel sounds, hasn't had BM for past 5 days, RN provided education of importance of avoiding straining while having BMs, ambulating and drinking plenty of fluids, pt voiced understanding. Pt up ad isabela, PRN pain meds provided, w/good relief, denies salty/metallic taste, scant SS drainage from nose, no continuous post-nasal drainage reported. RN provided d/c education to pt and his SO, provided TSS education sheets/packets, RN went over those sheets and pt and his SO were able to provided correct answers to teach-back questions, extra urinals provided as well. Pt left for d/c pharmacy and then home.

## 2018-07-03 ENCOUNTER — CARE COORDINATION (OUTPATIENT)
Dept: CARE COORDINATION | Facility: CLINIC | Age: 43
End: 2018-07-03

## 2018-07-03 ENCOUNTER — TELEPHONE (OUTPATIENT)
Dept: ENDOCRINOLOGY | Facility: CLINIC | Age: 43
End: 2018-07-03

## 2018-07-03 DIAGNOSIS — E23.6 PITUITARY MASS (H): Primary | ICD-10-CM

## 2018-07-03 NOTE — TELEPHONE ENCOUNTER
[facility] Call Center    Phone Message    May a detailed message be left on voicemail: yes    Reason for Call: Other: Pt needs hosp fu with Dr. Gamboa - no availability within timeframe.     Action Taken: Message routed to:  [facility] ([facility] Endocrine

## 2018-07-04 DIAGNOSIS — D49.7 PITUITARY TUMOR: Primary | ICD-10-CM

## 2018-07-05 ENCOUNTER — TELEPHONE (OUTPATIENT)
Dept: NEUROSURGERY | Facility: CLINIC | Age: 43
End: 2018-07-05

## 2018-07-05 DIAGNOSIS — E87.1 SODIUM (NA) DEFICIENCY: Primary | ICD-10-CM

## 2018-07-05 NOTE — TELEPHONE ENCOUNTER
Neurosurgery Discharge Follow-Up      Responsible Attending Physician:   Date of Discharge:    Discharge to:  Home    Current Status:  Pt is a 42 y/o male s/p Pituitary resection on 5/29.  Reports pre-op symptoms improved.   Operative  pain is decreasing.  Ambulating without assistance.  Pain well controlled with current meds, ample supply.  Reports incision CDI without signs of infection.  Denies redness, swelling, increased tenderness, or elevated temp.  Currently experiencing constipation.  Writer suggested to have significant other  OTC laxatives and a suppository at the pharmacy.  Pt stated that he has not had a bowel movement since before surgery.  Writer stressed the importance of getting  No visible sutures/staples in place.      Discharge instructions and medication use were reviewed.  RN triage/on call number given:  [phone] or after hours and w/e - [phone].  Patient verbalized understanding and agreement with current plan.    Follow up appointments/imaging/tests needed:

## 2018-07-06 ENCOUNTER — TRANSFERRED RECORDS (OUTPATIENT)
Dept: HEALTH INFORMATION MANAGEMENT | Facility: CLINIC | Age: 43
End: 2018-07-06

## 2018-07-06 ENCOUNTER — TELEPHONE (OUTPATIENT)
Dept: NEUROSURGERY | Facility: CLINIC | Age: 43
End: 2018-07-06

## 2018-07-06 ENCOUNTER — CARE COORDINATION (OUTPATIENT)
Dept: NEUROSURGERY | Facility: CLINIC | Age: 43
End: 2018-07-06

## 2018-07-06 NOTE — TELEPHONE ENCOUNTER
Gay called and let the writer know that the clinic, where the plans to establish care, can draw the sodium lab.    Writer sent the order yesterday to the number provided.  Gay stated that the clinic did not get it.  The order was resent this morning.

## 2018-07-09 ENCOUNTER — TELEPHONE (OUTPATIENT)
Dept: NEUROSURGERY | Facility: CLINIC | Age: 43
End: 2018-07-09

## 2018-07-09 NOTE — TELEPHONE ENCOUNTER
Neurosurgery Discharge Follow-Up      Responsible Attending Physician:   Date of Discharge:    Discharge to:  Home    Current Status:  Pt is a 44 y/o male s/p Pituitary tumor removal on 6/29.  Reports pre-op symptoms improved.   Operative  pain is decreasing.  Ambulating without assistance.  Pain well controlled with current meds, ample supply.  Reports incision CDI without signs of infection.  Denies redness, swelling, increased tenderness, or elevated temp.  Denies current bowel or bladder issues.  No visible sutures/staples in place.      Discharge instructions and medication use were reviewed.  RN triage/on call number given:  [phone] or after hours and w/e - [phone].  Patient verbalized understanding and agreement with current plan.    Follow up appointments/imaging/tests needed:        Pt stated that the vision in his right eye is completely back to normal.  His vision is getting better.  He can see a little bit of light.

## 2018-07-11 LAB — COPATH REPORT: NORMAL

## 2018-07-16 NOTE — TELEPHONE ENCOUNTER
Patient will draw labs on 7/26 before Christal appointment. We will follow up if there is anything urgent.

## 2018-07-26 ENCOUNTER — TELEPHONE (OUTPATIENT)
Dept: ENDOCRINOLOGY | Facility: CLINIC | Age: 43
End: 2018-07-26

## 2018-07-26 ENCOUNTER — OFFICE VISIT (OUTPATIENT)
Dept: OTOLARYNGOLOGY | Facility: CLINIC | Age: 43
End: 2018-07-26
Payer: COMMERCIAL

## 2018-07-26 VITALS — BODY MASS INDEX: 29.77 KG/M2 | WEIGHT: 201 LBS | HEIGHT: 69 IN

## 2018-07-26 DIAGNOSIS — E23.6 PITUITARY MASS (H): ICD-10-CM

## 2018-07-26 DIAGNOSIS — E87.1 SODIUM (NA) DEFICIENCY: ICD-10-CM

## 2018-07-26 DIAGNOSIS — E23.6 PITUITARY MASS (H): Primary | ICD-10-CM

## 2018-07-26 LAB
ANION GAP SERPL CALCULATED.3IONS-SCNC: 5 MMOL/L (ref 3–14)
BUN SERPL-MCNC: 16 MG/DL (ref 7–30)
CALCIUM SERPL-MCNC: 9.1 MG/DL (ref 8.5–10.1)
CHLORIDE SERPL-SCNC: 106 MMOL/L (ref 94–109)
CO2 SERPL-SCNC: 29 MMOL/L (ref 20–32)
CREAT SERPL-MCNC: 0.95 MG/DL (ref 0.66–1.25)
GFR SERPL CREATININE-BSD FRML MDRD: 86 ML/MIN/1.7M2
GLUCOSE SERPL-MCNC: 48 MG/DL (ref 70–99)
OSMOLALITY UR: 94 MMOL/KG (ref 100–1200)
POTASSIUM SERPL-SCNC: 4.4 MMOL/L (ref 3.4–5.3)
PROLACTIN SERPL-MCNC: 4 UG/L (ref 2–18)
SODIUM SERPL-SCNC: 140 MMOL/L (ref 133–144)
SP GR UR STRIP: 1 (ref 1–1.03)

## 2018-07-26 PROCEDURE — 84146 ASSAY OF PROLACTIN: CPT | Performed by: INTERNAL MEDICINE

## 2018-07-26 ASSESSMENT — PAIN SCALES - GENERAL: PAINLEVEL: NO PAIN (0)

## 2018-07-26 NOTE — PATIENT INSTRUCTIONS
1.  You were seen in the ENT Clinic today by Dr. Siegel.  If you have any questions or concerns after your appointment, please call [phone].  Press option #1 for scheduling related needs.  Press option #3 for Nurse advice.  2.  Plan is to return to clinic as needed.   3. Please use saline nasal spray to keep the nose moist.  Please continue sinus precautions for 2 more weeks.

## 2018-07-26 NOTE — MR AVS SNAPSHOT
After Visit Summary   7/26/2018    Vincenzo Avery    MRN: [id number]           Patient Information     Date Of Birth          1975        Visit Information        Provider Department      7/26/2018 5:20 PM Jo-Ann Green MD [facility]        Today's Diagnoses     Pituitary mass (H)    -  1      Care Instructions    1.  You were seen in the ENT Clinic today by Dr. Siegel.  If you have any questions or concerns after your appointment, please call [phone].  Press option #1 for scheduling related needs.  Press option #3 for Nurse advice.  2.  Plan is to return to clinic as needed.   3. Please use saline nasal spray to keep the nose moist.  Please continue sinus precautions for 2 more weeks.                Follow-ups after your visit        Your next 10 appointments already scheduled     Sep 25, 2018 12:00 PM CDT   MR BRAIN W/O & W CONTRAST with UUMR1   [facility], [place], [facility] ([facility], [facility])    [address] [place] [address] [phone]           Take your medicines as usual, unless your doctor tells you not to. Bring a list of your current medicines to your exam (including vitamins, minerals and over-the-counter drugs).  You may or may not receive intravenous (IV) contrast for this exam pending the discretion of the Radiologist.  You do not need to do anything special to prepare.  The MRI machine uses a strong magnet. Please wear clothes without metal (snaps, zippers). A sweatsuit works well, or we may give you a hospital gown.  Please remove any body piercings and hair extensions before you arrive. You will also remove watches, jewelry, hairpins, wallets, dentures, partial dental plates and hearing aids. You may wear contact lenses, and you may be able to wear your rings. We have a safe place to keep your personal items, but it is safer to leave them at home.  **IMPORTANT** THE INSTRUCTIONS BELOW  ARE ONLY FOR THOSE PATIENTS WHO HAVE BEEN PRESCRIBED SEDATION OR GENERAL ANESTHESIA DURING THEIR MRI PROCEDURE:  IF YOUR DOCTOR PRESCRIBED ORAL SEDATION (take medicine to help you relax during your exam):   You must get the medicine from your doctor (oral medication) before you arrive. Bring the medicine to the exam. Do not take it at home. You ll be told when to take it upon arriving for your exam.   Arrive one hour early. Bring someone who can take you home after the test. Your medicine will make you sleepy. After the exam, you may not drive, take a bus or take a taxi by yourself.  IF YOUR DOCTOR PRESCRIBED IV SEDATION:   Arrive one hour early. Bring someone who can take you home after the test. Your medicine will make you sleepy. After the exam, you may not drive, take a bus or take a taxi by yourself.   No eating 6 hours before your exam. You may have clear liquids up until 4 hours before your exam. (Clear liquids include water, clear tea, black coffee and fruit juice without pulp.)  IF YOUR DOCTOR PRESCRIBED ANESTHESIA (be asleep for your exam):   Arrive 1 1/2 hours early. Bring someone who can take you home after the test. You may not drive, take a bus or take a taxi by yourself.   No eating 8 hours before your exam. You may have clear liquids up until 4 hours before your exam. (Clear liquids include water, clear tea, black coffee and fruit juice without pulp.)   You will spend four to five hours in the recovery room.  Please call the Imaging Department at your exam site with any questions.            Sep 25, 2018  2:00 PM CDT   (Arrive by 1:45 PM)   Return Visit with Rolf Neely MD   [facility] ([facility])    [address] [place] [id number] [phone]              Who to contact     Please call your clinic at [phone] to:    Ask questions about your health    Make or cancel appointments    Discuss your medicines    Learn  "about your test results    Speak to your doctor            Additional Information About Your Visit        MyChart Information     [employer] is an electronic gateway that provides easy, online access to your medical records. With [employer], you can request a clinic appointment, read your test results, renew a prescription or communicate with your care team.     To sign up for [employer] visit the website at [email or web address] ([email or web address])   You will be asked to enter the access code listed below, as well as some personal information. Please follow the directions to create your username and password.     Your access code is: [email or web address]  Expires: 10/25/2018  5:59 PM     Your access code will  in 90 days. If you need help or a new code, please contact your [facility] or call [phone] for assistance.        Care EveryWhere ID     This is your Care EveryWhere ID. This could be used by other organizations to access your [facility] medical records  [id number]        Your Vitals Were     Height BMI (Body Mass Index)                1.753 m (5' 9\") 29.68 kg/m2           Blood Pressure from Last 3 Encounters:   18 127/71   18 124/85   18 117/80    Weight from Last 3 Encounters:   18 91.2 kg (201 lb)   18 92.9 kg (204 lb 12.9 oz)   18 92.6 kg (204 lb 3.2 oz)              We Performed the Following     NASAL ENDOSCOPY, DIAGNOSTIC        Primary Care Provider Fax #    Physician No Ref-Primary [phone]       No address on file        Equal Access to Services     VICENTE RODARTE : Hadii ike ku hadasho Soomaali, waaxda luqadaha, qaybta kaalmada adeegyada, abebe nagel . So [facility] [phone].    ATENCIÓN: Si habla español, tiene a peralta disposición servicios gratuitos de asistencia lingüística. Llame al [phone].    We comply with applicable federal civil rights laws and [place] laws. We do not discriminate on the basis of race, color, " national origin, age, disability, sex, sexual orientation, or gender identity.            Thank you!     Thank you for choosing [facility] E[facility]SE [facility] TH[facility]AT  for your care. Our goal is always to provide you with excellent care. Hearing back from our patients is one way we can continue to improve our services. Please take a few minutes to complete the written survey that you may receive in the mail after your visit with us. Thank you!             Your Updated Medication List - Protect others around you: Learn how to safely use, store and throw away your medicines at www.disposemymeds.org.          This list is accurate as of 7/26/18 11:59 PM.  Always use your most recent med list.                   Brand Name Dispense Instructions for use Diagnosis    * hydrocortisone 10 MG tablet    CORTEF    30 tablet    Take 1 tablet (10 mg) by mouth every evening    Pituitary mass (H)       * hydrocortisone 20 MG tablet    CORTEF    30 tablet    Take 1 tablet (20 mg) by mouth every morning    Pituitary mass (H)       oxyCODONE IR 10 MG tablet    ROXICODONE    60 tablet    Take 0.5-1 tablets (5-10 mg) by mouth every 3 hours as needed for moderate to severe pain    Pituitary mass (H)       pantoprazole 40 MG EC tablet    PROTONIX    30 tablet    Take 1 tablet (40 mg) by mouth every morning (before breakfast)    Pituitary mass (H)       sodium chloride 0.65 % nasal spray    OCEAN NASAL SPRAY    15 mL    Spray 1 spray into both nostrils daily as needed for congestion    Pituitary mass (H)       * Notice:  This list has 2 medication(s) that are the same as other medications prescribed for you. Read the directions carefully, and ask your doctor or other care provider to review them with you.

## 2018-07-26 NOTE — PROGRESS NOTES
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      TREATMENT:  The patient underwent transnasal endoscopic resection of pituitary macroadenoma on 06/29/2018.  Reconstruction was done with Durepair.  The right-sided nasoseptal flap was placed back into its original position.      HISTORY OF PRESENT ILLNESS:  Mr. Avery is a 43-year-old gentleman who is back to the Otolaryngology Clinic for his first postoperative visit.  He is doing well.  He is telling me that he recovered all his vision on the right eye and some on the left.      PHYSICAL EXAMINATION:    Constitutional: The patient was well-groomed and in no acute distress.    Skin: Warm and pink.    Neurologic: Alert and oriented x3. Cranial nerves III-XII within normal limits. Voice quality is normal.    Psychiatric: The patient's affect was calm, cooperative and appropriate.    Respiratory: Breathing comfortably without stridor or exertion of accessory muscles.    Eyes: Pupils were equal and reactive. Extraocular movements are intact.    Head: Normocephalic and atraumatic. No lesions or scars.    Ears: External auditory canals and tympanic membranes were clear.    Nose: Sinuses were nontender. Anterior rhinoscopy revealed midline septum and absence of purulence or polyps.    Oral cavity/Oropharynx: Normal tongue, floor of mouth, buccal mucosa and palate. No lesions or masses on inspection or palpation. No abnormal lymph tissue in the oropharynx.    Neck: The parotids are soft without masses. Supple with normal laryngeal and tracheal landmarks.    Lymphatic: There is no palpable lymphadenopathy or other masses in the neck or parotids.       PROCEDURE: Nasal endoscopy: The risks and benefits of the procedure were discussed and written consent was obtained. A timeout was performed. The patient's nose was sprayed with lidocaine and Afrin, and a 0-degree nasal endoscope was used to gain access into  the nasal cavity.  I removed the Samano splints.  I suctioned the posterior septectomy and the sphenoid sinus where there was abundant amount of crusting, Gelfoam, and Surgicel.  There are some inflammatory polyps within the sphenoid sinus.  No evidence of purulence.  No evidence of CSF leak.      ASSESSMENT AND PLAN:  This is a 43-year-old gentleman status post transnasal endoscopic resection of pituitary macroadenoma, doing well.  Things are healing very nicely.  We are going to see him on a p.r.n. basis.  He is going to see Dr. Neely at the end of September with an MRI.  I recommended he keep using his saline solution irrigations for another 2 3 weeks, and he is going to have sinonasal restrictions for two more weeks.

## 2018-07-26 NOTE — TELEPHONE ENCOUNTER
Received a call from labs for critical glucose of 48. The labs was drawn at 16.56. The patient was seen by ENT in clinic today for post-op follow up.     Called the patient for this result. He reported feeling fine and normal. Denies dizziness, lightheadedness, dizziness, sweating, shaking. He is not diabetic and denies any insulin use. He never has low blood sugar before. He reported has not been eating anything since 8PM yesterday (fasting almost 20 hours before this labs check). He is hungry and planning to eat big dinner with his brother soon. Informed the patient that his blood sugar of 48 is a result of prolong fasting, and in non-diabetic patient prolong fasting blood sugar in the mid 40s-50s can be normal. Instructed patient to drink juice or eat candy if having symptoms. Patient understood.     Belkis Danielle MD

## 2018-07-26 NOTE — LETTER
7/26/2018       RE: Vincenzo Avery  [address] [place] [id number]     Dear Colleague,    Thank you for referring your patient, Vincenzo Avery, to the [facility] at [facility]. Please see a copy of my visit note below.    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      TREATMENT:  The patient underwent transnasal endoscopic resection of pituitary macroadenoma on 06/29/2018.  Reconstruction was done with Durepair.  The right-sided nasoseptal flap was placed back into its original position.      HISTORY OF PRESENT ILLNESS:  Mr. Avery is a 43-year-old gentleman who is back to the Otolaryngology Clinic for his first postoperative visit.  He is doing well.  He is telling me that he recovered all his vision on the right eye and some on the left.      PHYSICAL EXAMINATION:    Constitutional: The patient was well-groomed and in no acute distress.    Skin: Warm and pink.    Neurologic: Alert and oriented x3. Cranial nerves III-XII within normal limits. Voice quality is normal.    Psychiatric: The patient's affect was calm, cooperative and appropriate.    Respiratory: Breathing comfortably without stridor or exertion of accessory muscles.    Eyes: Pupils were equal and reactive. Extraocular movements are intact.    Head: Normocephalic and atraumatic. No lesions or scars.    Ears: External auditory canals and tympanic membranes were clear.    Nose: Sinuses were nontender. Anterior rhinoscopy revealed midline septum and absence of purulence or polyps.    Oral cavity/Oropharynx: Normal tongue, floor of mouth, buccal mucosa and palate. No lesions or masses on inspection or palpation. No abnormal lymph tissue in the oropharynx.    Neck: The parotids are soft without masses. Supple with normal laryngeal and tracheal landmarks.    Lymphatic: There is no palpable lymphadenopathy or other masses in the neck  or parotids.       PROCEDURE: Nasal endoscopy: The risks and benefits of the procedure were discussed and written consent was obtained. A timeout was performed. The patient's nose was sprayed with lidocaine and Afrin, and a 0-degree nasal endoscope was used to gain access into the nasal cavity.  I removed the Samano splints.  I suctioned the posterior septectomy and the sphenoid sinus where there was abundant amount of crusting, Gelfoam, and Surgicel.  There are some inflammatory polyps within the sphenoid sinus.  No evidence of purulence.  No evidence of CSF leak.      ASSESSMENT AND PLAN:  This is a 43-year-old gentleman status post transnasal endoscopic resection of pituitary macroadenoma, doing well.  Things are healing very nicely.  We are going to see him on a p.r.n. basis.  He is going to see Dr. Neely at the end of September with an MRI.  I recommended he keep using his saline solution irrigations for another 2 3 weeks, and he is going to have sinonasal restrictions for two more weeks.       Again, thank you for allowing me to participate in the care of your patient.      Sincerely,    Jo-Ann Green MD

## 2018-07-26 NOTE — NURSING NOTE
Chief Complaint   Patient presents with     RECHECK     3 week follow up after surgery.      Marky Jc, EMT

## 2018-08-17 LAB
CORTISOL: 7.8 UG/DL
OSMOLALITY: 298 MOSM/KG

## 2018-08-18 LAB — ACTH PLAS-MCNC: 20 PG/ML

## 2018-08-20 ENCOUNTER — TELEPHONE (OUTPATIENT)
Dept: ENDOCRINOLOGY | Facility: CLINIC | Age: 43
End: 2018-08-20

## 2018-09-25 ENCOUNTER — OFFICE VISIT (OUTPATIENT)
Dept: NEUROSURGERY | Facility: CLINIC | Age: 43
End: 2018-09-25
Payer: COMMERCIAL

## 2018-09-25 ENCOUNTER — HOSPITAL ENCOUNTER (OUTPATIENT)
Dept: MRI IMAGING | Facility: CLINIC | Age: 43
Discharge: HOME | End: 2018-09-25
Attending: STUDENT IN AN ORGANIZED HEALTH CARE EDUCATION/TRAINING PROGRAM | Admitting: STUDENT IN AN ORGANIZED HEALTH CARE EDUCATION/TRAINING PROGRAM
Payer: COMMERCIAL

## 2018-09-25 VITALS
HEIGHT: 68 IN | WEIGHT: 215.7 LBS | SYSTOLIC BLOOD PRESSURE: 136 MMHG | OXYGEN SATURATION: 97 % | DIASTOLIC BLOOD PRESSURE: 77 MMHG | HEART RATE: 50 BPM | BODY MASS INDEX: 32.69 KG/M2

## 2018-09-25 DIAGNOSIS — D35.2 BENIGN NEOPLASM OF PITUITARY GLAND AND CRANIOPHARYNGEAL DUCT (POUCH) (H): Primary | ICD-10-CM

## 2018-09-25 DIAGNOSIS — D49.7 PITUITARY TUMOR: ICD-10-CM

## 2018-09-25 DIAGNOSIS — D35.3 BENIGN NEOPLASM OF PITUITARY GLAND AND CRANIOPHARYNGEAL DUCT (POUCH) (H): Primary | ICD-10-CM

## 2018-09-25 PROCEDURE — 70553 MRI BRAIN STEM W/O & W/DYE: CPT

## 2018-09-25 PROCEDURE — A9585 GADOBUTROL INJECTION: HCPCS | Performed by: STUDENT IN AN ORGANIZED HEALTH CARE EDUCATION/TRAINING PROGRAM

## 2018-09-25 PROCEDURE — 25500064 ZZH RX 255 OP 636: Performed by: STUDENT IN AN ORGANIZED HEALTH CARE EDUCATION/TRAINING PROGRAM

## 2018-09-25 RX ORDER — GADOBUTROL 604.72 MG/ML
10 INJECTION INTRAVENOUS ONCE
Status: COMPLETED | OUTPATIENT
Start: 2018-09-25 | End: 2018-09-25

## 2018-09-25 RX ADMIN — GADOBUTROL 10 ML: 604.72 INJECTION INTRAVENOUS at 12:28

## 2018-09-25 ASSESSMENT — PAIN SCALES - GENERAL: PAINLEVEL: NO PAIN (0)

## 2018-09-25 NOTE — PROGRESS NOTES
Dear Dr. Meneses:    We saw Mr. Vincenzo Avery back in Pituitary Clinic today.   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     Currently, he is doing well. His headaches are gone. He is not on any hormones. His energy levels are improving but he still becomes tired early in the evening. His eyesight is particularly bad at night. His left-sided vision (which was worse preoperatively) has not improved much since surgery. He still describes a large temporal field cut on the left side. His right side vision has recovered well. He still has some low energy but is not on any hormone replacement. His AM cortisol level was 7.8 after discontinuing hydrocortisone. He can see colors, but notes more eh as well. He is not driving.    Social history: never smoker. + cannabis. Denies other drugs. 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     Physical exam:   /80 (BP Location: Right arm, Patient Position: Right side, Cuff Size: Adult Regular)  Pulse 72  Temp 97  F (36.1  C) (Oral)  Resp 16  Wt 96.3 kg (212 lb 6.4 oz)  SpO2 98%    On confrontation he has a deep left superior temporal quadrant deficit and a shallow inferior temporal quadrant deficit. His fields are full elsewhere. Visual acuity on the right side was 20/25 and on the left side was at least 20/50. Extraocular movements were normal.     REVIEW OF STUDIES: We went over his MRI from today. He has the expected amount of residual on the right parasellar space. The entire central part of the tumor has been removed and his optic apparatus is completely decompressed. The pituitary gland is present and displaced to the right side of the sella.    IMPRESSION AND PLAN: We will arrange for formal postoperative opthalmology evaluation in [place] with you. Given his poor vision preoperatively, he may not improve further on the left side, but  we typically give patients up to a year. We discussed management of the residual tumor in the right parasellar space. For now we will continue to observe. Given his young age, we prefer an attempt at reoperation if there is regrowth, especially with his retained pituitary function. We also discussed radiation therapy is an option. Because of his young age we favor surgery over radiation. But for now, continued observation is reasonable. We will follow up with a repeat MRI in 1 year. Please do not hesitate to contact us with questions. We will keep you informed of his progress.      MD GUANACO YO, Kamran Powell, am serving as a scribe to document services personally performed by Rolf Neely MD, based upon my observations and the provider's statements to me. All documentation has been reviewed by the aforementioned doctor prior to being entered into the official medical record.    I, Rolf Neely, attest that above named individual is acting in scribe capacity, has observed my performance of the services and has documented them in accordance with my direction. The documentation recorded by the scribe accurately reflects the service I personally performed and the decisions made by me.

## 2018-09-25 NOTE — LETTER
9/25/2018      RE: Vincenzo Avery  [address] [place] [address]  [facility] [address]       Dear Dr. Meneses:    We saw Mr. Vincenzo Avery back in Pituitary Clinic today.   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     Currently, he is doing well. His headaches are gone. He is not on any hormones. His energy levels are improving but he still becomes tired early in the evening. His eyesight is particularly bad at night. His left-sided vision (which was worse preoperatively) has not improved much since surgery. He still describes a large temporal field cut on the left side. His right side vision has recovered well. He still has some low energy but is not on any hormone replacement. His AM cortisol level was 7.8 after discontinuing hydrocortisone. He can see colors, but notes more eh as well. He is not driving.    Social history: never smoker. + cannabis. Denies other drugs. 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     Physical exam:   /80 (BP Location: Right arm, Patient Position: Right side, Cuff Size: Adult Regular)  Pulse 72  Temp 97  F (36.1  C) (Oral)  Resp 16  Wt 96.3 kg (212 lb 6.4 oz)  SpO2 98%    On confrontation he has a deep left superior temporal quadrant deficit and a shallow inferior temporal quadrant deficit. His fields are full elsewhere. Visual acuity on the right side was 20/25 and on the left side was at least 20/50. Extraocular movements were normal.     REVIEW OF STUDIES: We went over his MRI from today. He has the expected amount of residual on the right parasellar space. The entire central part of the tumor has been removed and his optic apparatus is completely decompressed. The pituitary gland is present and displaced to the right side of the sella.    IMPRESSION AND PLAN: We will arrange for formal postoperative opthalmology evaluation in [place] with you. Given his  poor vision preoperatively, he may not improve further on the left side, but we typically give patients up to a year. We discussed management of the residual tumor in the right parasellar space. For now we will continue to observe. Given his young age, we prefer an attempt at reoperation if there is regrowth, especially with his retained pituitary function. We also discussed radiation therapy is an option. Because of his young age we favor surgery over radiation. But for now, continued observation is reasonable. We will follow up with a repeat MRI in 1 year. Please do not hesitate to contact us with questions. We will keep you informed of his progress.      I, Kamran Powell, am serving as a scribe to document services personally performed by Rolf Neely MD, based upon my observations and the provider's statements to me. All documentation has been reviewed by the aforementioned doctor prior to being entered into the official medical record.    I, Rolf Neely, attest that above named individual is acting in scribe capacity, has observed my performance of the services and has documented them in accordance with my direction. The documentation recorded by the scribe accurately reflects the service I personally performed and the decisions made by me.      Rolf Neely MD

## 2018-09-25 NOTE — PATIENT INSTRUCTIONS
Follow up with Dr. Neely in one year with a MRI before the appointment.  Dr. Neely's nurse is Toney and her number is [phone].  Toney will help you find an ophthmologist in [place].

## 2018-09-25 NOTE — NURSING NOTE
Chief Complaint   Patient presents with     RECHECK     [facility] RETURN - 3 MONTH POST OP, DOS 6/29/18       Tadeo De, EMT

## 2018-09-25 NOTE — MR AVS SNAPSHOT
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 Neely in one year with a MRI before the appointment.  Dr. Neely's nurse is Toney and her number is [phone].  Toney will help you find an ophthmologist in [place].          Follow-ups after your visit        Follow-up notes from your care team     Return in about 1 year (around 2019).      Who to contact     Please call your clinic at [phone] to:    Ask questions about your health    Make or cancel appointments    Discuss your medicines    Learn about your test results    Speak to your doctor            Additional Information About Your Visit        MyChart Information     [employer] is an electronic gateway that provides easy, online access to your medical records. With [employer], you can request a clinic appointment, read your test results, renew a prescription or communicate with your care team.     To sign up for [employer] visit the website at [email or web address]   You will be asked to enter the access code listed below, as well as some personal information. Please follow the directions to create your username and password.     Your access code is: [email or web address]  Expires: 10/25/2018  5:59 PM     Your access code will  in 90 days. If you need help or a new code, please contact your [facility] or call [phone] for assistance.        Care EveryWhere ID     This is your Care EveryWhere ID. This could be used by other organizations to access your [place] medical records  [id number]        Your Vitals Were     Pulse Height Pulse  "Oximetry BMI (Body Mass Index)          50 1.715 m (5' 7.5\") 97% 33.28 kg/m2         Blood Pressure from Last 3 Encounters:   09/25/18 136/77   07/02/18 127/71   06/28/18 124/85    Weight from Last 3 Encounters:   09/25/18 97.8 kg (215 lb 11.2 oz)   07/26/18 91.2 kg (201 lb)   06/29/18 92.9 kg (204 lb 12.9 oz)               Primary Care Provider Fax #    Physician No Ref-Primary [phone]       No address on file        Equal Access to Services     [facility] : Hadnicolás Malloy, becky andrews, deonte kaalmaaditya yee, abebe nagel . So [facility] [phone].    ATENCIÓN: Si habla español, tiene a peralta disposición servicios gratuitos de asistencia lingüística. [facility] [phone].    We comply with applicable federal civil rights laws and [place] laws. We do not discriminate on the basis of race, color, national origin, age, disability, sex, sexual orientation, or gender identity.            Thank you!     Thank you for choosing [facility]  for your care. Our goal is always to provide you with excellent care. Hearing back from our patients is one way we can continue to improve our services. Please take a few minutes to complete the written survey that you may receive in the mail after your visit with us. Thank you!             Your Updated Medication List - Protect others around you: Learn how to safely use, store and throw away your medicines at www.disposemymeds.org.          This list is accurate as of 9/25/18 11:59 PM.  Always use your most recent med list.                   Brand Name Dispense Instructions for use Diagnosis    * hydrocortisone 10 MG tablet    CORTEF    30 tablet    Take 1 tablet (10 mg) by mouth every evening    Pituitary mass (H)       * hydrocortisone 20 MG tablet    CORTEF    30 tablet    Take 1 tablet (20 mg) by mouth every morning    Pituitary mass (H)       oxyCODONE IR 10 MG tablet    ROXICODONE    60 tablet    Take 0.5-1 tablets " (5-10 mg) by mouth every 3 hours as needed for moderate to severe pain    Pituitary mass (H)       pantoprazole 40 MG EC tablet    PROTONIX    30 tablet    Take 1 tablet (40 mg) by mouth every morning (before breakfast)    Pituitary mass (H)       sodium chloride 0.65 % nasal spray    OCEAN NASAL SPRAY    15 mL    Spray 1 spray into both nostrils daily as needed for congestion    Pituitary mass (H)       * Notice:  This list has 2 medication(s) that are the same as other medications prescribed for you. Read the directions carefully, and ask your doctor or other care provider to review them with you.

## 2018-09-25 NOTE — LETTER
9/25/2018       RE: Vincenzo Avery  [address] [place] [address]  [facility] [address]     Dear Colleague,    Thank you for referring your patient, Vincenzo Avery, to the [facility] NEUROSURGERY at [facility]. Please see a copy of my visit note below.    Dear Dr. Meneses:    We saw Mr. Vincenzo Avery back in Pituitary Clinic today.   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     Currently, he is doing well. His headaches are gone. He is not on any hormones. His energy levels are improving but he still becomes tired early in the evening. His eyesight is particularly bad at night. His left-sided vision (which was worse preoperatively) has not improved much since surgery. He still describes a large temporal field cut on the left side. His right side vision has recovered well. He still has some low energy but is not on any hormone replacement. His AM cortisol level was 7.8 after discontinuing hydrocortisone. He can see colors, but notes more eh as well. He is not driving.    Social history: never smoker. + cannabis. Denies other drugs. 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     Physical exam:   /80 (BP Location: Right arm, Patient Position: Right side, Cuff Size: Adult Regular)  Pulse 72  Temp 97  F (36.1  C) (Oral)  Resp 16  Wt 96.3 kg (212 lb 6.4 oz)  SpO2 98%    On confrontation he has a deep left superior temporal quadrant deficit and a shallow inferior temporal quadrant deficit. His fields are full elsewhere. Visual acuity on the right side was 20/25 and on the left side was at least 20/50. Extraocular movements were normal.     REVIEW OF STUDIES: We went over his MRI from today. He has the expected amount of residual on the right parasellar space. The entire central part of the tumor has been removed and his optic apparatus is completely decompressed.  The pituitary gland is present and displaced to the right side of the sella.    IMPRESSION AND PLAN: We will arrange for formal postoperative opthalmology evaluation in [place] with you. Given his poor vision preoperatively, he may not improve further on the left side, but we typically give patients up to a year. We discussed management of the residual tumor in the right parasellar space. For now we will continue to observe. Given his young age, we prefer an attempt at reoperation if there is regrowth, especially with his retained pituitary function. We also discussed radiation therapy is an option. Because of his young age we favor surgery over radiation. But for now, continued observation is reasonable. We will follow up with a repeat MRI in 1 year. Please do not hesitate to contact us with questions. We will keep you informed of his progress.      MD GUANACO YO Wyatt Hedine, am serving as a scribe to document services personally performed by Rolf Neely MD, based upon my observations and the provider's statements to me. All documentation has been reviewed by the aforementioned doctor prior to being entered into the official medical record.    I, Rolf Neely, attest that above named individual is acting in scribe capacity, has observed my performance of the services and has documented them in accordance with my direction. The documentation recorded by the scribe accurately reflects the service I personally performed and the decisions made by me.                    Again, thank you for allowing me to participate in the care of your patient.      Sincerely,    Rolf Neely MD

## 2018-09-27 ENCOUNTER — PATIENT OUTREACH (OUTPATIENT)
Dept: NEUROSURGERY | Facility: CLINIC | Age: 43
End: 2018-09-27

## 2018-09-27 DIAGNOSIS — H53.459: Primary | ICD-10-CM

## 2018-11-01 ENCOUNTER — TRANSFERRED RECORDS (OUTPATIENT)
Dept: HEALTH INFORMATION MANAGEMENT | Facility: CLINIC | Age: 43
End: 2018-11-01

## 2019-03-25 ENCOUNTER — PATIENT OUTREACH (OUTPATIENT)
Dept: NEUROSURGERY | Facility: CLINIC | Age: 44
End: 2019-03-25

## 2019-03-25 NOTE — PROGRESS NOTES
Returned call.    Writer talked with Gay.  Gay was wondering if pt should see MD before a year.  Gay stated the pt has been pushing himself and becomes fatigued.  Pt can move 4-5 arm loads of wood and then he needs to rest for 3 to 4 hours.  Increased fatigue started about a month ago.  Pt is very sensitive to light.  If he is exposed to light for too long he will get a migraine.  He is having the same amount of headaches as prior to surgery, but they are not as intense.  Sometimes the headaches respond to over the counter medications and sometimes not.  Gay reported that pt has improved vision in his left eye.  Besides the fatigue, none of the other symptoms are new.

## 2019-03-28 ENCOUNTER — TRANSFERRED RECORDS (OUTPATIENT)
Dept: HEALTH INFORMATION MANAGEMENT | Facility: CLINIC | Age: 44
End: 2019-03-28

## 2019-04-08 ENCOUNTER — TELEPHONE (OUTPATIENT)
Dept: NEUROSURGERY | Facility: CLINIC | Age: 44
End: 2019-04-08

## 2019-04-09 NOTE — TELEPHONE ENCOUNTER
Writer Recieved in Right fax 04/04/2018 medical opionin and KEV. Completed, faxed, scanned to pt chart Medical opinion for [facility] [place] Team at [phone] phone is [phone].   All scanned to pt chart.

## 2019-08-29 ENCOUNTER — TELEPHONE (OUTPATIENT)
Dept: NEUROSURGERY | Facility: CLINIC | Age: 44
End: 2019-08-29

## 2019-08-29 NOTE — TELEPHONE ENCOUNTER
Writer spoke with Gay. Form was not faxed to correct phone number. Writer completed most recent form 04/2019. Patient needs new one completed. Gay to fax to correct fax, writer to complete, provider to sign. Writer will fax and call Gay to confirm.

## 2019-09-06 ENCOUNTER — CARE COORDINATION (OUTPATIENT)
Dept: NEUROSURGERY | Facility: CLINIC | Age: 44
End: 2019-09-06

## 2019-09-12 ENCOUNTER — CARE COORDINATION (OUTPATIENT)
Dept: NEUROSURGERY | Facility: CLINIC | Age: 44
End: 2019-09-12

## 2019-09-17 ENCOUNTER — APPOINTMENT (OUTPATIENT)
Dept: LAB | Facility: CLINIC | Age: 44
End: 2019-09-17
Payer: COMMERCIAL

## 2019-09-17 ENCOUNTER — OFFICE VISIT (OUTPATIENT)
Dept: NEUROSURGERY | Facility: CLINIC | Age: 44
End: 2019-09-17
Payer: COMMERCIAL

## 2019-09-17 VITALS
RESPIRATION RATE: 16 BRPM | HEART RATE: 66 BPM | DIASTOLIC BLOOD PRESSURE: 95 MMHG | OXYGEN SATURATION: 98 % | SYSTOLIC BLOOD PRESSURE: 146 MMHG

## 2019-09-17 DIAGNOSIS — D35.3 BENIGN NEOPLASM OF PITUITARY GLAND AND CRANIOPHARYNGEAL DUCT (POUCH) (H): Primary | ICD-10-CM

## 2019-09-17 DIAGNOSIS — D35.2 BENIGN NEOPLASM OF PITUITARY GLAND AND CRANIOPHARYNGEAL DUCT (POUCH) (H): Primary | ICD-10-CM

## 2019-09-17 LAB
ANION GAP SERPL CALCULATED.3IONS-SCNC: 4 MMOL/L (ref 3–14)
BUN SERPL-MCNC: 20 MG/DL (ref 7–30)
CALCIUM SERPL-MCNC: 9.2 MG/DL (ref 8.5–10.1)
CHLORIDE SERPL-SCNC: 109 MMOL/L (ref 94–109)
CO2 SERPL-SCNC: 24 MMOL/L (ref 20–32)
CORTIS SERPL-MCNC: 6.2 UG/DL (ref 4–22)
CREAT SERPL-MCNC: 0.82 MG/DL (ref 0.66–1.25)
FSH SERPL-ACNC: 9.8 IU/L (ref 0.7–10.8)
GFR SERPL CREATININE-BSD FRML MDRD: >90 ML/MIN/{1.73_M2}
GLUCOSE SERPL-MCNC: 92 MG/DL (ref 70–99)
LH SERPL-ACNC: 1.8 IU/L (ref 1.5–9.3)
POTASSIUM SERPL-SCNC: 3.9 MMOL/L (ref 3.4–5.3)
PROLACTIN SERPL-MCNC: 5 UG/L (ref 2–18)
SODIUM SERPL-SCNC: 137 MMOL/L (ref 133–144)
T4 FREE SERPL-MCNC: 0.8 NG/DL (ref 0.76–1.46)
TSH SERPL DL<=0.005 MIU/L-ACNC: 1.55 MU/L (ref 0.4–4)

## 2019-09-17 PROCEDURE — 83002 ASSAY OF GONADOTROPIN (LH): CPT | Performed by: NEUROLOGICAL SURGERY

## 2019-09-17 PROCEDURE — 84146 ASSAY OF PROLACTIN: CPT | Performed by: NEUROLOGICAL SURGERY

## 2019-09-17 PROCEDURE — 82024 ASSAY OF ACTH: CPT | Performed by: NEUROLOGICAL SURGERY

## 2019-09-17 PROCEDURE — 83003 ASSAY GROWTH HORMONE (HGH): CPT | Performed by: NEUROLOGICAL SURGERY

## 2019-09-17 PROCEDURE — 84403 ASSAY OF TOTAL TESTOSTERONE: CPT | Performed by: NEUROLOGICAL SURGERY

## 2019-09-17 PROCEDURE — 82533 TOTAL CORTISOL: CPT | Performed by: NEUROLOGICAL SURGERY

## 2019-09-17 PROCEDURE — 84305 ASSAY OF SOMATOMEDIN: CPT | Performed by: NEUROLOGICAL SURGERY

## 2019-09-17 ASSESSMENT — PAIN SCALES - GENERAL: PAINLEVEL: NO PAIN (0)

## 2019-09-17 ASSESSMENT — PATIENT HEALTH QUESTIONNAIRE - PHQ9: SUM OF ALL RESPONSES TO PHQ QUESTIONS 1-9: 19

## 2019-09-17 NOTE — NURSING NOTE
Chief Complaint   Patient presents with     RECHECK     [facility] RETURN PITUITARY TUMOR      Depression Response    Patient completed the PHQ-9 assessment for depression and scored >9? Yes  Question 9 on the PHQ-9 was positive for suicidality? No  Is the patient already receiving treatment for depression? No  Patient would like to speak with behavioral health team ([facility] clinics only)? No    I personally notified the following: clinic nurse    Behavioral Health/Social Work Contact Information     [facility]  Daniel Fuentes MA, LMFT  Lead Behavioral Health Clinician  Phone: [phone]  [facility] Pager: [phone]    Non-[facility] Clinics  [facility] On-Call   Pager: [phone]       Allie Osei CMA

## 2019-09-17 NOTE — PROGRESS NOTES
Dear Dr. Meneses:    We saw Mr. Vincenzo Avery back in [facility] Neurosurgery Clinic today for MRI follow-up of his large pituitary macroadenoma, for which he underwent an endoscopic, transnasal approach on 6/29/2018. Currently, he is doing well. He reports consistently low energy and photophobia over time. He also has a headache which he attributes to straining his eyes. His right-sided vision is stable except for the photophobia. He does not drive. He does not have established endocrinological care. He admits to polyuria which is new after the surgery. He denies history of diabetes, changes in libido, hair loss, nail thickening, hot/cold hypersensitivity, or erectile dysfunction. His appetite remains the same but he has gained approximately 20 lbs due to low activity secondary to fatigue.     PHYSICAL EXAM: On confrontation he has a deep left superior temporal quadrant deficit and a shallow inferior temporal quadrant deficit. His fields are full elsewhere. Visual acuity on the right side was 20/25 and on the left side was at least 20/50. Extraocular movements were normal.     REVIEW OF STUDIES: We went over his MRI from today. He has the expected amount of residual on the right parasellar space. The entire central part of the tumor has been removed and his optic apparatus is completely decompressed. The pituitary gland is present and displaced to the right side of the sella.    IMPRESSION AND PLAN: We will arrange for formal postoperative opthalmology evaluation in [place] with you. Given his poor vision preoperatively, he may not improve further on the left side, but we typically give patients up to a year. We discussed management of the residual tumor in the right parasellar space. For now we will continue to observe. Given his young age, we prefer an attempt at reoperation if there is regrowth, especially with his retained pituitary function. We also discussed radiation therapy is an option. Because of his young  age we favor surgery over radiation. But for now, continued observation is reasonable. We will follow up with a repeat MRI in 1 year.     Please do not hesitate to contact us with questions. We will keep you informed of his progress.    JANUSZ CHADWICK MD    I, Srini Camejo, am serving as a scribe to document services personally performed by Janusz Chadwick MD, based upon my observations and the provider's statements to me. All documentation has been reviewed by the aforementioned doctor prior to being entered into the official medical record.

## 2019-09-17 NOTE — PATIENT INSTRUCTIONS
Follow up with Dr. Neely March or April 2020 with a MRI prior to your visit.    Referral for an ophthalmologist.  A base line vision test is needed.

## 2019-09-17 NOTE — LETTER
Date:September 25, 2019      Patient was self referred, no letter generated. Do not send.        [facility] Health Information

## 2019-09-17 NOTE — Clinical Note
9/17/2019       RE: Vincenzo Avery  [address] [place] [address]  [facility] [address]     Dear Colleague,    Thank you for referring your patient, Vincenzo Avery, to the [facility] NEUROSURGERY at [facility]. Please see a copy of my visit note below.    Dear Dr. Meneses:    We saw Mr. Vincenzo Avery back in Pituitary Neurosurgery Clinic today for MRI follow-up of his large pituitary macroadenoma, for which he underwent an endoscopic, transnasal approach on 6/29/2018. Currently, he is doing well. He reports consistently low energy and photophobia over time. He also has a headache which he attributes to straining his eyes. His right-sided vision is stable except for the photophobia. He does not drive. He does not have established endocrinological care. He admits to polyuria which is new after the surgery. He denies history of diabetes, changes in libido, hair loss, nail thickening, hot/cold hypersensitivity, or erectile dysfunction. His appetite remains the same but he has gained approximately 20 lbs due to low activity secondary to fatigue.     PHYSICAL EXAM: On confrontation he has a deep left superior temporal quadrant deficit and a shallow inferior temporal quadrant deficit. His fields are full elsewhere. Visual acuity on the right side was 20/25 and on the left side was at least 20/50. Extraocular movements were normal.     REVIEW OF STUDIES: We went over his MRI from today. He has the expected amount of residual on the right parasellar space. The entire central part of the tumor has been removed and his optic apparatus is completely decompressed. The pituitary gland is present and displaced to the right side of the sella.    IMPRESSION AND PLAN: We will arrange for formal postoperative opthalmology evaluation in [place] with you. Given his poor vision preoperatively, he may not improve further on the left side, but we typically give patients up to a year. We discussed management of the residual  tumor in the right parasellar space. For now we will continue to observe. Given his young age, we prefer an attempt at reoperation if there is regrowth, especially with his retained pituitary function. We also discussed radiation therapy is an option. Because of his young age we favor surgery over radiation. But for now, continued observation is reasonable. We will follow up with a repeat MRI in 1 year.     Please do not hesitate to contact us with questions. We will keep you informed of his progress.    JANUSZ CHADWICK MD    I, Srini Camejo, am serving as a scribe to document services personally performed by Janusz Chadwick MD, based upon my observations and the provider's statements to me. All documentation has been reviewed by the aforementioned doctor prior to being entered into the official medical record.    Again, thank you for allowing me to participate in the care of your patient.      Sincerely,    Janusz Chadwick MD

## 2019-09-18 LAB
ACTH PLAS-MCNC: 23 PG/ML
GH SERPL-MCNC: <0.1 UG/L

## 2019-09-19 LAB — TESTOST SERPL-MCNC: 292 NG/DL (ref 240–950)

## 2019-09-20 LAB — IGF-I BLD-MCNC: 92 NG/ML (ref 74–227)

## 2019-09-23 ENCOUNTER — CARE COORDINATION (OUTPATIENT)
Dept: NEUROSURGERY | Facility: CLINIC | Age: 44
End: 2019-09-23

## 2019-09-23 NOTE — PROGRESS NOTES
Writer faxed to [facility] medical opinion form michelle, labled and scanned to patient chart. ELIAS scanned to patieint chart.

## 2019-09-25 ENCOUNTER — PATIENT OUTREACH (OUTPATIENT)
Dept: NEUROSURGERY | Facility: CLINIC | Age: 44
End: 2019-09-25

## 2019-09-25 NOTE — PROGRESS NOTES
Returned call.    Pt's significant other called and stated that the disability office has not received paperwork.  In the message the significant other stated that this would need to be done by Thursday (9/26).    Writer spoke with pt.  He stated that his significant other was sleeping and would call once she is awake.

## 2019-09-26 LAB — A-PGH SER-MCNC: 1.8 NG/ML

## 2019-09-27 ENCOUNTER — PATIENT OUTREACH (OUTPATIENT)
Dept: NEUROSURGERY | Facility: CLINIC | Age: 44
End: 2019-09-27

## 2019-09-27 NOTE — PROGRESS NOTES
Returned call.    Pt's significant other called and stated that the [facility] needed the medical opinion letter.    Writer called the Department of Human Services in [place].  Writer spoke with staff at [place].  Writer was told that the Human Services had everything they needed.  This was relayed to patient's significant.  Writer reviewed lab with significant other.  Hormones were in normal limits.

## 2019-10-02 ENCOUNTER — CARE COORDINATION (OUTPATIENT)
Dept: NEUROSURGERY | Facility: CLINIC | Age: 44
End: 2019-10-02

## 2019-10-02 NOTE — PROGRESS NOTES
Writer sent message to RN to call patient spouse who is waiting for call from RN regarding elevated pitutary results.     Gay/wicho erwin stated that all of the paper had been received that writer completed and faxed.     Writer sent email of paperwork in order to facility patient taking to the [employer] for application. Text will be sent to writer of any forms attention Alesia.

## 2019-10-03 ENCOUNTER — PATIENT OUTREACH (OUTPATIENT)
Dept: NEUROSURGERY | Facility: CLINIC | Age: 44
End: 2019-10-03

## 2019-10-03 NOTE — PROGRESS NOTES
Returned call.    Writer spoke with MD regarding pituitary labs.  All the hormones were WNL.  The pituitary maker was elevated.  Pt has known residual tumor.  Writer verified with MD that the residual tumor was the reason the lab value was elevated.  Writer explained this to pt's signifigant several times last week.

## 2020-02-27 PROBLEM — E23.6 PITUITARY MASS (H): Status: ACTIVE | Noted: 2018-04-25

## 2020-09-10 ENCOUNTER — TELEPHONE (OUTPATIENT)
Dept: NEUROSURGERY | Facility: CLINIC | Age: 45
End: 2020-09-10

## 2020-09-10 NOTE — TELEPHONE ENCOUNTER
Writer reached out to pt regarding a MRI for his 9/15/2020 for his appt with neurosurgeon.  Need to track down or send order to local facility to have the MRI done prior to the visit or reschedule for a later date.      Also, need to see if pt has had a ophthalmology appt within the last year.    Message left.

## 2020-09-14 ENCOUNTER — TRANSFERRED RECORDS (OUTPATIENT)
Dept: HEALTH INFORMATION MANAGEMENT | Facility: CLINIC | Age: 45
End: 2020-09-14

## 2020-09-15 ENCOUNTER — VIRTUAL VISIT (OUTPATIENT)
Dept: NEUROSURGERY | Facility: CLINIC | Age: 45
End: 2020-09-15
Payer: COMMERCIAL

## 2020-09-15 DIAGNOSIS — D35.2 PITUITARY MACROADENOMA (H): Primary | ICD-10-CM

## 2020-09-15 NOTE — LETTER
Date:October 16, 2020      Patient was self referred, no letter generated. Do not send.        [facility] Information

## 2020-09-15 NOTE — LETTER
9/15/2020      RE: Vincenzo Avery  [address] [place] [address] [address]  [facility] [address]       See dictated note.  Telephone visit 15 minutes.  MD Rolf Gilmore MD

## 2020-09-15 NOTE — LETTER
9/15/2020       RE: Vincenzo Avery  [address] [place] [address] [address]  [place] [place] [address]     Dear Colleague,    Thank you for referring your patient, Vincenzo Avery, to the [facility] NEUROSURGERY at [facility] Please see a copy of my visit note below.    See dictated note.  Telephone visit 15 minutes.  ODALYS Neely MD      Again, thank you for allowing me to participate in the care of your patient.      Sincerely,    Rolf Neely MD

## 2020-09-15 NOTE — LETTER
9/15/2020       RE: Vincenzo Avery   [facility] [address]  [facility] [place] [address]     Dear Colleague,    Thank you for referring your patient, Vincenzo Avery, to the [facility] NEUROSURGERY at [facility]. Please see a copy of my visit note below.    Service Date: 09/15/2020      MD Dr. Sunday Pagan and Gideon   [address] [place]  [address]      RE: Vincenzo Avery   MRN: [id number]   : 1975      Dear Dr. Meneses:      We spoke to Mr. Avery as part of a telephone visit in Pituitary Clinic on 09/15/2020.  As you know, he presented to you with vision loss and headaches and you found a bitemporal hemianopsia and diminished visual acuity bilaterally in 2018.  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  He tolerated the surgery well and we have been monitoring the residual right parasellar tumor.  He believes his right-sided vision has worsened over the past year.  In addition, he describes daily headaches which are mostly retroorbital on both sides.  He also describes increased fatigue.  His headaches do not seem to have any positional or temporal component.      Over the phone, he seemed to be in good spirits.  Phonation, speech and language all seemed normal.      REVIEW OF STUDIES:  We went over his MRI from 2020 (done at [facility]) and compared it to the MRI from 2018.  There has been growth of the residual tumor.  The vast majority of this residual tumor was in the right parasellar space.  This component of the tumor has expanded in all dimensions and there is now an intradural component compressing the right optic tract.  The optic chiasm remains decompressed.  The residual pituitary gland remains displaced to the left side of the sella.  The sella itself remains tumor free.      IMPRESSION AND PLAN:  We are concerned with the relatively rapid growth of the residual tumor.  Based on his young  age and the rate of growth, he will likely need a transcranial approach for resection that will involve a combination of an extradural and intradural approach.  We will have him see you for an updated eye exam.  Clinical findings will determine the urgency of surgery.  We will follow up with him after he has his examination with you.  Please do not hesitate to contact us with questions.      Again, thank you for allowing me to participate in the care of your patient.  Sincerely,        JANUSZ CHADWICK MD  D: 10/13/2020   T: 10/14/2020   MT: cristian      Name:     TAMMY OMER   MRN:      [id number]        Account:      [id number]   :      1975           Service Date: 09/15/2020      Document: [id number]

## 2020-09-28 ENCOUNTER — TELEPHONE (OUTPATIENT)
Dept: NEUROSURGERY | Facility: CLINIC | Age: 45
End: 2020-09-28

## 2020-09-28 NOTE — TELEPHONE ENCOUNTER
[facility] Call Center    Phone Message    May a detailed message be left on voicemail: yes     Reason for Call: Other: Patients girlfriend Gay calling to speak with Toney. Gay stated that they spoke with Dr. Neely last week and Dr. Neely was supposed to fax a form over to [place] welfare office so that patient can get benefits from welfare. Gay stated they already gave Dr. Neely the fax number last week and she does not have the fax number on her, but she is going to call to get that fax number and when Toney calls her back she will have that number.     Please advise    Action Taken: Other:  NEUROSURGERY     Travel Screening: Not Applicable

## 2020-09-28 NOTE — TELEPHONE ENCOUNTER
Writer reached out to pt and learned that his mos recent MRI done in [place] was available for the appt.    Pt stated that welfare has not received paperwork from this office.  Pt's significant other takes care of this and she was not available.  Pt's partner will call back.

## 2020-09-29 ENCOUNTER — MEDICAL CORRESPONDENCE (OUTPATIENT)
Dept: HEALTH INFORMATION MANAGEMENT | Facility: CLINIC | Age: 45
End: 2020-09-29

## 2020-09-29 ENCOUNTER — CARE COORDINATION (OUTPATIENT)
Dept: NEUROSURGERY | Facility: CLINIC | Age: 45
End: 2020-09-29

## 2020-09-29 NOTE — LETTER
9/29/2020            To Whom it May Concern,    Mr. Vincenzo Avery is under the care of Dr. Rolf Neely M.D. at the [facility].  Mr. Avery will need surgery for a pituitary tumor which is causing loss vision in his right eye.  The surgery will be scheduled before the end of the year.     Normally, patients would need 60 to 90 days off work to recover from pituitary surgery.  However, in Mr. Avery's case this may not be true. Unfortunately, he has pre-existing vision loss in left eye.  It is unclear if surgery will restore Mr. Avery's vision in his right eye.  If his vision does not improve he will no longer be able to work.  His ability to do so will be determined post surgery.    Sincerely,          Dr. Rolf Neely M.D.

## 2020-09-29 NOTE — PROGRESS NOTES
Writer faxed the Welfare Benefits letter the to the Welfare Office.  Pt's significant other provided the phone and fax number.    [phone] fax

## 2020-09-29 NOTE — TELEPHONE ENCOUNTER
Returned call.    Writer spoke with pt's significant other, Gay.  Gay gave writer the phone and fax number for the Welfare Office.  The Welfare Office needs a note on the clinic letter head stating the pt will need another surgery, when and if the pt can return to work.      In addition, pt needs another appt with his ophthalmologist.   Neurosurgeon needs mapping done of both eyes prior to surgery.  Gay is going to call and make the appt.  If pt cannot get in before the end of October Gay will contact us so we can contact ophthalmologist's office to facilitate an appt as soon as possible.

## 2020-10-14 NOTE — PROGRESS NOTES
Service Date: 09/15/2020      MD Dr. Sunday Pagan and Gideon   [address] [place]  [address]      RE: Vincenzo Avery   MRN: [id number]   : 1975      Dear Dr. Meneses:      We spoke to Mr. Avery as part of a telephone visit in Pituitary Clinic on 09/15/2020.  As you know, he presented to you with vision loss and headaches and you found a bitemporal hemianopsia and diminished visual acuity bilaterally in 2018.  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  He tolerated the surgery well and we have been monitoring the residual right parasellar tumor.  He believes his right-sided vision has worsened over the past year.  In addition, he describes daily headaches which are mostly retroorbital on both sides.  He also describes increased fatigue.  His headaches do not seem to have any positional or temporal component.      Over the phone, he seemed to be in good spirits.  Phonation, speech and language all seemed normal.      REVIEW OF STUDIES:  We went over his MRI from 2020 (done at [facility]) and compared it to the MRI from 2018.  There has been growth of the residual tumor.  The vast majority of this residual tumor was in the right parasellar space.  This component of the tumor has expanded in all dimensions and there is now an intradural component compressing the right optic tract.  The optic chiasm remains decompressed.  The residual pituitary gland remains displaced to the left side of the sella.  The sella itself remains tumor free.      IMPRESSION AND PLAN:  We are concerned with the relatively rapid growth of the residual tumor.  Based on his young age and the rate of growth, he will likely need a transcranial approach for resection that will involve a combination of an extradural and intradural approach.  We will have him see you for an updated eye exam.  Clinical findings will determine the urgency of surgery.  We  will follow up with him after he has his examination with you.  Please do not hesitate to contact us with questions.      Sincerely,         JANUSZ CHADWICK MD             D: 10/13/2020   T: 10/14/2020   MT: cristian      Name:     TAMMY OMER   MRN:      [id number]        Account:      [id number]   :      1975           Service Date: 09/15/2020      Document: [id number]

## 2020-10-16 ENCOUNTER — TELEPHONE (OUTPATIENT)
Dept: NEUROSURGERY | Facility: CLINIC | Age: 45
End: 2020-10-16

## 2020-10-16 NOTE — TELEPHONE ENCOUNTER
[facility] Call Center    Phone Message    May a detailed message be left on voicemail: yes     Reason for Other: Patients spouse is calling requesting to get surgery scheduled - Patient is scheduled for eye mapping on 10/21/20- and results will be sent to Dr. Posadas  for review; however, spouse wants to see if surgery can get scheduled. Please advise.     Action Taken: Other: [facility] NEUROSURGERY    Travel Screening: Not Applicable

## 2020-10-21 ENCOUNTER — TRANSFERRED RECORDS (OUTPATIENT)
Dept: HEALTH INFORMATION MANAGEMENT | Facility: CLINIC | Age: 45
End: 2020-10-21

## 2020-10-27 ENCOUNTER — TELEPHONE (OUTPATIENT)
Dept: NEUROSURGERY | Facility: CLINIC | Age: 45
End: 2020-10-27

## 2020-10-27 DIAGNOSIS — D35.2 PITUITARY MACROADENOMA (H): Primary | ICD-10-CM

## 2020-10-29 NOTE — TELEPHONE ENCOUNTER
Writer forwarded right fax opthalmology report to MD and RNCC for immediate review.     Report scanned to patient chart.     Alesia Scales LPN  Neurosurgery

## 2020-10-29 NOTE — TELEPHONE ENCOUNTER
Outbound call to spouse, to discuss Opthalmology  Report.  Call to eye MD and report faxed to Right Fax.   Will be in touch after Dr Neely reviews report.  Surgery scheduling will be affected by outcome of eye exam.    Voices understanding.

## 2020-11-02 ENCOUNTER — TELEPHONE (OUTPATIENT)
Dept: NEUROSURGERY | Facility: CLINIC | Age: 45
End: 2020-11-02

## 2020-11-02 NOTE — TELEPHONE ENCOUNTER
[facility] Call Center    Phone Message    May a detailed message be left on voicemail: yes     Reason for Call: Other: Gay calling to request a call back to discuss the receipt of the results from the MRI and eye mapping appointments. She is wanting to schedule Vincenzo's surgery. Please call her at your earliest convenience to discuss.     Action Taken: Message routed to:  [facility]    Travel Screening: Not Applicable

## 2020-11-04 NOTE — TELEPHONE ENCOUNTER
Spoke with Gay at length.  Per Dr Neely  1.  Prefers to wait on procedure   Opthamology Report no visual changes. Eye site OK.  Plan:  1. Follow up with Opthalmology for visual check in 6 months from previous OV, would be March 202.  Patient to schedule near home.    2.  Follow up with MRI Pituitary with and without contrast in 6 months March 2021  3.  Follow up to discuss all results with Dr Neely in 6 months - March 2021.    Discussed at length, Gay voices understanding.  Gay has my name and number if needed for any new symptoms or vision changes.

## 2020-11-04 NOTE — TELEPHONE ENCOUNTER
Writer called [facility] for imaging to be pushed to [facility].     Imaging Pushed to Pacs   Patient findings found in deleted folder.   Writer printed and scanned to patient chart.     Alesia Scales LPN   Neurosurgery

## 2020-11-04 NOTE — TELEPHONE ENCOUNTER
[facility] Call Center    Phone Message    May a detailed message be left on voicemail: yes     Reason for Call: Other: pt's girl friend, Gay, reporting: that she just spoke with [facility] ([facility] in [place]) and [facility] is sending the MRI of pt's tumor (that was done on 10/14/20) today to our clinic.     Gay is asking Dr Neely's care team to call her back in order to schedule the surgery before the end of the year.  Gay is asking for all details about the surgery/instructions, please. Call Gay at # [phone].  Thank y9ou.    Action Taken: Message routed to:  [facility]    Travel Screening: Not Applicable

## 2020-12-31 ENCOUNTER — TELEPHONE (OUTPATIENT)
Dept: NEUROSURGERY | Facility: CLINIC | Age: 45
End: 2020-12-31

## 2020-12-31 NOTE — TELEPHONE ENCOUNTER
Gay calling with new symptoms,  Patient is sleeping more during the day, ringing stops during sleep but slowly starts up again when awake.    Right sided migraine headache and head pressure on right side of head.      Will check with Dr Neely if he wants another Ophthamology exam and MRI Pituitary that was scheduled for March 2021,    Last telephone discussion 10/27/20 to discuss with Dr De La Cruz.    Will refer to Dr Neely and get back to patient.

## 2021-01-04 ENCOUNTER — TELEPHONE (OUTPATIENT)
Dept: NEUROSURGERY | Facility: CLINIC | Age: 46
End: 2021-01-04

## 2021-01-04 NOTE — TELEPHONE ENCOUNTER
[facility] Call Center    Phone Message    May a detailed message be left on voicemail: yes     Reason for Call: Other: Pt's significant otherr Gay Pickett missed call to schedule appt with Dr. Neely.     Please call Gay back to schedule appt.    Action Taken: Message routed to:  [facility]): Neurosurgery    Travel Screening: Not Applicable

## 2021-01-06 ENCOUNTER — TELEPHONE (OUTPATIENT)
Dept: NEUROSURGERY | Facility: CLINIC | Age: 46
End: 2021-01-06

## 2021-01-20 ENCOUNTER — TELEPHONE (OUTPATIENT)
Dept: NEUROSURGERY | Facility: CLINIC | Age: 46
End: 2021-01-20

## 2021-01-20 NOTE — TELEPHONE ENCOUNTER
Writer returned call to Gay to inform Medical Form received.   Gay provided e-mail address to sent form to and fax number to fax form attention Diasy.     Alesia Scales LPN  Neurosurgery

## 2021-01-20 NOTE — TELEPHONE ENCOUNTER
[facility] Call [facility]    Phone Message    May a detailed message be left on voicemail: yes     Reason for Call: Other: Gay returning a call from clinic. States that they need patients medical opinion form sent in by tomorrow 1/21/21 at 4:00 from Dr. Neely otherwise patient will lose benefits for Feburary. States that they faxed it over to the clinic on 1/6/21.     States that [facility] called them today around 2:45 stating that it needs to be turned in by tomorrow at 4:00. States that fax number is on the form that was faxed in to fax it back.     Please advise and call Gay back at your earliest convenience to discuss further     Action Taken: Other: [facility] NEUROSURGERY     Travel Screening: Not Applicable

## 2021-01-20 NOTE — TELEPHONE ENCOUNTER
[facility] Call [facility]    Phone Message    May a detailed message be left on voicemail: yes     Reason for Call: Other: Gay calling to request a call back. She sates a medical opinion form was sent on 01/06 and the Welfare office has not received it. She states form needs to be sent as soon as possible. If they don't receive it by tomorrow pt won't get his benefits. Please call her back as soon as possible to discuss.      Action Taken: Message routed to:  [facility] melodie herrera    Travel Screening: Not Applicable

## 2021-01-21 ENCOUNTER — CARE COORDINATION (OUTPATIENT)
Dept: NEUROSURGERY | Facility: CLINIC | Age: 46
End: 2021-01-21

## 2021-01-21 NOTE — PROGRESS NOTES
sent the completed Medical Opinion to     [facility].     Fax:[phone]  Phone: [phone]    Writer call Gay to confirm e-mail was received.    sent e-mail to     [email or web address]    Gay will contact  if she has not received the e-mail.     Alesia Scales LPN  Neurosurgery

## 2021-02-16 ENCOUNTER — TELEPHONE (OUTPATIENT)
Dept: NEUROSURGERY | Facility: CLINIC | Age: 46
End: 2021-02-16

## 2021-02-16 NOTE — TELEPHONE ENCOUNTER
[facility] Call [facility]    Phone Message    May a detailed message be left on voicemail: yes     Reason for Call: Other: Gay calling to request a call back to discuss the imaging and imaping appointments. She stated that she was just talking to Geovany and was disconnected. Please call her at your earliest convenience to discuss.     Action Taken: Message routed to:  [facility]    Travel Screening: Not Applicable

## 2021-02-17 ENCOUNTER — TELEPHONE (OUTPATIENT)
Dept: NEUROSURGERY | Facility: CLINIC | Age: 46
End: 2021-02-17

## 2021-02-17 NOTE — TELEPHONE ENCOUNTER
Spoke with Gay.  Per Dr Neely phone call last weekend, patient will need Eye appointment with Eye mapping and new MRI Pituitary with and without contrast prior to setting up procedure for patient.  MRI Order faxed via [employer] to [facility] in [place], patient will see his own Eye MD Dr Vick for eye mapping.    Gay will callback when all is completed, talking about the getting completed by first week in March.    Voices understanding.    Gay has my name and number if needed.

## 2021-02-22 ENCOUNTER — TELEPHONE (OUTPATIENT)
Dept: NEUROSURGERY | Facility: CLINIC | Age: 46
End: 2021-02-22

## 2021-02-22 DIAGNOSIS — D35.2 PITUITARY MACROADENOMA (H): Primary | ICD-10-CM

## 2021-02-22 NOTE — TELEPHONE ENCOUNTER
[facility] Call Center    Phone Message    May a detailed message be left on voicemail: yes     Reason for Call: Other: Marilee calling from [facility] with Dr. Brice Vick's office. States that patient contacted them and states he needs some mapping for potential procedure with Dr. Neely and may need follow up with Dr. Vick. Wondering what Dr. Neely would like Dr. Vick to do for him if patient is needing to see .     Please advise and call Marilee back at your earliest convenience to discuss further     Action Taken: Other: [facility]     Travel Screening: Not Applicable

## 2021-02-22 NOTE — PROGRESS NOTES
I spoke to Mr. Avery's partner recently and discussed craniotomy for the residual adenoma which has been growing. He may need a combined extradural-intradural approach. We discussed the risks of the operation including death, stroke (Carotid injury), cranial nerve injury, CSF leak, infection, brain injury, and they agree to proceed. Will aim for surgery in the next few weeks. We can cancel his scheduled clinic visit in mid March.  ODALYS Neely MD

## 2021-02-23 NOTE — TELEPHONE ENCOUNTER
Spoke with family, explained surgery date will be set, working with Surgery scheduling.    MRI and Eye mappiing scheduled for 3/4/21, surgery should be soon within several weeks.    Voices understanding, reviewed surgery time, re coop time 6-10 weeks, will see how it goes.  Patient ready to have procedure, will be in touch soon with date/time.  Voices understanding.

## 2021-02-23 NOTE — TELEPHONE ENCOUNTER
patient will need Eye appointment with Eye mapping or Visual Muñoz.  Spoke with Dr Vick office all is set, patient has appointment next week.    Voices understanding.

## 2021-03-03 ENCOUNTER — TELEPHONE (OUTPATIENT)
Dept: NEUROSURGERY | Facility: CLINIC | Age: 46
End: 2021-03-03

## 2021-03-04 ENCOUNTER — TRANSFERRED RECORDS (OUTPATIENT)
Dept: HEALTH INFORMATION MANAGEMENT | Facility: CLINIC | Age: 46
End: 2021-03-04

## 2021-03-04 ENCOUNTER — TELEPHONE (OUTPATIENT)
Dept: NEUROSURGERY | Facility: CLINIC | Age: 46
End: 2021-03-04

## 2021-03-04 ENCOUNTER — DOCUMENTATION ONLY (OUTPATIENT)
Dept: NEUROSURGERY | Facility: CLINIC | Age: 46
End: 2021-03-04

## 2021-03-04 DIAGNOSIS — D35.2 PITUITARY MACROADENOMA (H): Primary | ICD-10-CM

## 2021-03-04 NOTE — PROGRESS NOTES
Surgery packet mailed with Covid Order, patient requests completed near home.  Pre-op Teaching    Procedure:   stealth assisted Right frontotemporal craniotomy and resection of tumor Right General   INSERTION, DRAIN, SPINE, LUMBAR         Planned Surgery Date:3/22  Surgeon:Chin                Discussed pre-op routine and requirements to include:  surgical procedure, post-op recovery and expectations, need for H&P, NPO prior to OR, pre-op antibacterial showers, pain control and importance of follow-up visits.  Surgery scheduling will coordinate OR time/date and update patient as appropriate.  3C will call to re-inforce instructions 24-48 hours prior to surgery.       Ample time was provided for patient questions and in-depth discussion of topics of heightened interest.  Reviewed medication list and provided instructions regarding what medications to stop prior to surgery . Anti-bacterial soap solution for pre-op showers will be provided at PAC visit as well as specific instructions for use. Approximately 20 minutes spent with patient/family discussing and reviewing.      Patient provided triage contact number for questions or concerns that may arise prior to surgery. Pre-op folder with specific written instructions given to patient for review.  Patient teach back method completed.

## 2021-03-04 NOTE — TELEPHONE ENCOUNTER
Surgery Scheduled    DOS: 3/22/21  Provider: Kyle  Location: [facility] OR  PAC: 3/16/21  COVID test:Locally  Post Op: 4/6/21 with Brielle Barros  Patient Called:LVM for patient requesting a call back to confirm DOS and appts.    Nuvasive: #[id number]    Extra Imaging: MRI and CT prior to surgery (morning of) Order needed for CT.    Additional Notes: Alina, please let me know when orders are in!    Thanks!

## 2021-03-05 NOTE — TELEPHONE ENCOUNTER
[facility] Call Center    Phone Message    May a detailed message be left on voicemail: yes     Reason for Call: Other: Pt's significant other, Gay, calling to verify date and time of surgery, as well as anything else that needs to be done prior to surgery.  Please call to confirm.    Action Taken: Message routed to:  [facility]    Travel Screening: Not Applicable

## 2021-03-05 NOTE — TELEPHONE ENCOUNTER
FUTURE VISIT INFORMATION      SURGERY INFORMATION:    Date: 3.22.21    Location: [facility] OR    Surgeon:  Dr. Neely and Dr. Peguero    Anesthesia Type:  general    Procedure: stealth assisted Right frontotemporal craniotomy and resection of tumor    Consult: 9.15.20    RECORDS REQUESTED FROM:         Most recent EKG+ Tracin18

## 2021-03-08 ENCOUNTER — TELEPHONE (OUTPATIENT)
Dept: NEUROSURGERY | Facility: CLINIC | Age: 46
End: 2021-03-08

## 2021-03-08 RX ORDER — CEFAZOLIN SODIUM 2 G/100ML
2 INJECTION, SOLUTION INTRAVENOUS
Status: CANCELLED | OUTPATIENT
Start: 2021-03-08

## 2021-03-08 RX ORDER — CEFAZOLIN SODIUM 2 G/100ML
2 INJECTION, SOLUTION INTRAVENOUS SEE ADMIN INSTRUCTIONS
Status: CANCELLED | OUTPATIENT
Start: 2021-03-08

## 2021-03-08 NOTE — TELEPHONE ENCOUNTER
Surgery Scheduled    DOS: 3/22/21  Provider: Chin/Sudhir  Location: [facility] OR  PAC: 3/16/21 virtual  COVID test: Locally at [facility] in [place], [place] (3/18)  Post Op: 4/6/21 with Brielle Barros  Patient Called: Yes, spoke with Gay (Lyudmila) and she is aware of needing to schedule covid test and all other appts.    Nuvasive: [id number]    Extra Imaging: MRI and CT prior to surgery    Additional Notes: NA

## 2021-03-16 ENCOUNTER — VIRTUAL VISIT (OUTPATIENT)
Dept: NEUROSURGERY | Facility: CLINIC | Age: 46
End: 2021-03-16
Payer: COMMERCIAL

## 2021-03-16 ENCOUNTER — ANESTHESIA EVENT (OUTPATIENT)
Dept: SURGERY | Facility: CLINIC | Age: 46
End: 2021-03-16
Payer: COMMERCIAL

## 2021-03-16 ENCOUNTER — PRE VISIT (OUTPATIENT)
Dept: SURGERY | Facility: CLINIC | Age: 46
End: 2021-03-16

## 2021-03-16 ENCOUNTER — VIRTUAL VISIT (OUTPATIENT)
Dept: SURGERY | Facility: CLINIC | Age: 46
End: 2021-03-16
Payer: COMMERCIAL

## 2021-03-16 DIAGNOSIS — D35.2 PITUITARY MACROADENOMA (H): Primary | ICD-10-CM

## 2021-03-16 DIAGNOSIS — Z01.818 PREOP EXAMINATION: Primary | ICD-10-CM

## 2021-03-16 PROCEDURE — 99214 OFFICE O/P EST MOD 30 MIN: CPT | Mod: 95 | Performed by: NEUROLOGICAL SURGERY

## 2021-03-16 PROCEDURE — 99203 OFFICE O/P NEW LOW 30 MIN: CPT | Mod: 95 | Performed by: CLINICAL NURSE SPECIALIST

## 2021-03-16 ASSESSMENT — LIFESTYLE VARIABLES: TOBACCO_USE: 0

## 2021-03-16 ASSESSMENT — PAIN SCALES - GENERAL: PAINLEVEL: MODERATE PAIN (5)

## 2021-03-16 NOTE — ANESTHESIA PREPROCEDURE EVALUATION
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]   Procedure : Procedure(s):  stealth assisted Right frontotemporal craniotomy and resection of tumor  INSERTION, DRAIN, SPINE, LUMBAR     Past Medical History:   Diagnosis Date     History of kidney stones      Migraines      NIC (obstructive sleep apnea)     Does not use CPAP     Pituitary mass (H)       Past Surgical History:   Procedure Laterality Date     DENTAL SURGERY      [place] teeth extraction     HAND SURGERY Right     Repair of fracture     OPTICAL TRACKING SYSTEM ENDOSCOPIC RESECTION TUMOR CRANIAL N/A 2018    Procedure: OPTICAL TRACKING SYSTEM ENDOSCOPIC RESECTION TUMOR CRANIAL;  Stealth Assisted Endoscopic Transnasal Resection Of Pituitary Tumor;  Surgeon: Jo-Ann Green MD;  Location: [facility] OR     ORCHIECTOMY SCROTAL Right       Allergies   Allergen Reactions     Morphine      Patient has significant nausea/vomiting with IV morphine.        Social History     Tobacco Use     Smoking status: Never Smoker     Smokeless tobacco: Never Used   Substance Use Topics     Alcohol use: Yes     Comment: Rarely      Wt Readings from Last 1 Encounters:   18 97.8 kg (215 lb 11.2 oz)        Anesthesia Evaluation   Pt has had prior anesthetic. Type: General.    No history of anesthetic complications       ROS/MED HX  ENT/Pulmonary: Comment: Patient believes he has NIC but has never been tested. Family history. No CPAP.    (+) NIC risk factors, obese, daytime somnolence,  (-) tobacco use   Neurologic: Comment: Persistent tinnitus, headache, and lethargy. Intermittent visual disturbance.      Cardiovascular:  - neg cardiovascular ROS   (+) -----Previous cardiac testing   Echo: Date: Results:    Stress Test: Date: Results:    ECG Reviewed: Date:  Results:  SR 1st degree AV block, nonspecific T wave abnormality  Cath: Date: Results:       METS/Exercise Tolerance: 4 - Raking leaves, gardening    Hematologic:  - neg hematologic  ROS     Musculoskeletal:  - neg musculoskeletal ROS     GI/Hepatic:  - neg GI/hepatic ROS     Renal/Genitourinary:  - neg Renal ROS     Endo: Comment: Pituitary macroadenoma    (+) Obesity,     Psychiatric/Substance Use: Comment: Smoking marijuana several times daily.      Infectious Disease:  - neg infectious disease ROS     Malignancy:   (+) Malignancy, History of Other.Other CA Pituitary mass Active status post Surgery.    Other:  - neg other ROS          Physical Exam    Airway   unable to assess     Mallampati: II   TM distance: > 3 FB   Neck ROM: full   Mouth opening: > 3 cm    Respiratory Devices and Support         Dental  no notable dental history         Cardiovascular          Rhythm and rate: regular and normal     Pulmonary   pulmonary exam normal        breath sounds clear to auscultation       Other findings: Virtual visit    OUTSIDE LABS:  CBC:   Lab Results   Component Value Date    WBC 10.4 06/30/2018    WBC 7.1 06/29/2018    HGB 7.6 (L) 06/30/2018    HGB 8.5 (L) 06/29/2018    HCT 22.4 (L) 06/30/2018    HCT 25.0 (L) 06/29/2018     06/30/2018     (L) 06/29/2018     BMP:   Lab Results   Component Value Date     09/17/2019     07/26/2018    POTASSIUM 3.9 09/17/2019    POTASSIUM 4.4 07/26/2018    CHLORIDE 109 09/17/2019    CHLORIDE 106 07/26/2018    CO2 24 09/17/2019    CO2 29 07/26/2018    BUN 20 09/17/2019    BUN 16 07/26/2018    CR 0.82 09/17/2019    CR 0.95 07/26/2018    GLC 92 09/17/2019    GLC 48 (LL) 07/26/2018     COAGS:   Lab Results   Component Value Date    PTT 31 06/28/2018    INR 0.91 06/28/2018     POC:   Lab Results   Component Value Date     (H) 06/29/2018     HEPATIC: No results found for: ALBUMIN, PROTTOTAL, ALT, AST, GGT, ALKPHOS, BILITOTAL, BILIDIRECT, GURMEET  OTHER:   Lab Results   Component Value Date    PH 7.41 06/29/2018    LACT 2.4 (H) 06/29/2018    KANA  9.2 09/17/2019    TSH 1.55 09/17/2019    T4 0.80 09/17/2019       Anesthesia Plan    ASA Status:  2   NPO Status:  NPO Appropriate    Anesthesia Type: General.     - Airway: ETT   Induction: Intravenous.   Maintenance: Balanced.   Techniques and Equipment:     - Lines/Monitors: 2nd IV, Arterial Line (possible lumbar drain)     Consents    Anesthesia Plan(s) and associated risks, benefits, and realistic alternatives discussed. Questions answered and patient/representative(s) expressed understanding.     - Discussed with:  Patient      - Extended Intubation/Ventilatory Support Discussed: Yes.    Use of blood products discussed: Yes.     - Discussed with: Patient.     Postoperative Care    Pain management: IV analgesics, Oral pain medications, Multi-modal analgesia.   PONV prophylaxis: Ondansetron (or other 5HT-3), Dexamethasone or Solumedrol, Background Propofol Infusion     Comments:              PAC Discussion and Assessment    ASA Classification: 3  Case is suitable for: [place]  Anesthetic techniques and relevant risks discussed: GA  Invasive monitoring and risk discussed: No    Possibility and Risk of blood transfusion discussed: No            PAC Resident/NP Anesthesia Assessment: Vincenzo Avery is a 45 year old male scheduled to undergo Right frontotemporal craniotomy and resection of tumor, INSERTION, DRAIN, SPINE, LUMBAR with Evan Neely and Sudhir on 3/22/21. He has the following specific operative considerations:   - RCRI : No serious cardiac risks.    - VTE risk: 0.5%  - NIC # of risks 4-5/8 = Likely high risk for NIC  - Risk of PONV score = 2.  If > 2, anti-emetic intervention recommended.    --Pituitary macroadenoma s/p transsphenoidal resection in 2018. Rapid growth of residual tumor noted in follow up. Severe right head pain and ear ringing. Above procedure now planned. Is smoking marijuana several times daily to dull pain.  --Patient reports that in the past, he has pulled at lines, etc when  first waking from anesthesia. He also reports past experience with liquid morphine and he was unable to eat due to GI distress.   --No cardiac history, symptoms or meds. EKG above. Good activity tolerance. Works on farm.   --Nonsmoker. Believes he has NIC but has not been tested. No CPAP. Will require careful monitoring in the post op period.   --History of kidney stones with no recurrence.   --Denies history of blood transfusion.     Dr. Neely has a plan to check cortisol and thyroid profile locally prior to surgery. Basic lab update including type and screen on DOS.          Reviewed and Signed by PAC Mid-Level Provider/Resident  Mid-Level Provider/Resident: YULIYA Gilmore, LAMINE  Date: 3/16/21  Time: 1:08pm                               YULIYA Mix

## 2021-03-16 NOTE — LETTER
3/16/2021       RE: Vincenzo Avery  [address]  [place] [facility] [place] [address]     Dear Colleague,    Thank you for referring your patient, Vincenzo Avery, to the [facility] [place] at [facility]. Please see a copy of my visit note below.    Vincenzo is a 45 year old who is being evaluated via a billable telephone visit.      What phone number would you like to be contacted at? [phone]  How would you like to obtain your AVS? MAIL  Phone call duration: 10 minutes    Headaches restarted last Summer. Lying down improves. Develops tinnitus along with the headaches. Sleeps much of the day. Fatigued. Before last June, tinnitus was intermittent. Bitemporal headaches. No hair loss. Appetite is good and some weight gain. 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   Eye exam 3/4/21 at Dr. Meneses.  Will check cortisol and thyroid profile locally prior to surgery.  ODALYS Neely MD      Again, thank you for allowing me to participate in the care of your patient.      Sincerely,    Rolf Neely MD

## 2021-03-16 NOTE — LETTER
3/16/2021      RE: Vincenzo Avery   [address]  [place] [address]       Vincenzo is a 45 year old who is being evaluated via a billable telephone visit.      What phone number would you like to be contacted at? [phone]  How would you like to obtain your AVS? MAIL  Phone call duration: 10 minutes    Headaches restarted last Summer. Lying down improves. Develops tinnitus along with the headaches. Sleeps much of the day. Fatigued. Before last , tinnitus was intermittent. Bitemporal headaches. No hair loss. Appetite is good and some weight gain. 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   Eye exam 3/4/21 at Dr. Meneses.  Will check cortisol and thyroid profile locally prior to surgery.  ODALYS Neely MD    Service Date: 2021      MD Dr. Sunday [facility]   [address] [place]  [address]      RE: Vincenzo Avery                                      MRN: [id number]   : 1975      Dear Dr. Meneses:      We spoke to Mr. Avery as part of a telephone visit on 2021 to discuss his upcoming surgery for his recurrent pituitary macroadenoma.  As you know, he is about 3 years out from an endoscopic transnasal approach for subtotal resection of a large pituitary adenoma.  We resected the central part of the tumor and decompressed the optic apparatus.  However, we left the right parasellar component behind.  This component has grown progressively and he has now developed an intradural component that extends posteriorly towards the interpeduncular cistern.  He is not on any hormone replacement.  He has had fatigue since the first operation.  He sleeps much of the day.  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      His headaches restarted last summer and lying down makes them better.  He also has tinnitus bilaterally.  The tinnitus is now constant.  Prior to last summer, the tinnitus with intermittent.  His headaches are mostly bitemporal.       On endocrinological review of symptoms, he describes no hair loss.  His appetite has been good.  Because he has been sedentary, he has gained some weight.  He had an eye exam earlier this month with you.  Although your note was not available to us, Mr. Avery reports that his vision is unchanged.  Specifically, he denies any problems with his right-sided vision. His left sided vision remains poor.     REVIEW OF STUDIES:  We went over his MRI from earlier this month.  We had previously discussed this with his partner in a separate phone call last month.  The right parasellar component has a large exophytic component that has grown.  There is also contact with the right side of the optic chiasm.  There is an intradural component of tumor that extends towards the third ventricle floor and right optic tract.  It extends towards the interpeduncular cistern and makes contact with the right cerebral peduncle.  In total, this residual tumor that has grown is over 4 cm in size.  There is no recurrence of the intrasellar component.      IMPRESSION AND PLAN:  Mr. Avery is already scheduled for a transcranial approach for 03/22/2021.  We are planning a right frontotemporal craniotomy and with a transzygomatic osteotomy.  We will then debulk as much of the right parasellar component as possible.  We will resect as much of the intradural component as possible.  The limiting factor in resection of the parasellar component will be the cranial nerves.  The limiting factor for the intradural component will be the arterial involvement, specifically branches of the right ICA and right PCA.  We went over the major risks of the operation including death, stroke, right-sided blindness right-sided ophthalmoplegia, CSF leak, infection, panhypopituitarism and need for reoperation.  The etiology of his fatigue is unclear.  We will recheck cortisol and thyroid profile prior to or on the morning of surgery.  We will keep you informed of  his progress following this formidable operation.  Please do not hesitate to contact us with questions.      Sincerely,      JANUSZ CHADWICK MD          D: 2021   T: 2021   MT: cristian      Name:     TAMMY OMER   MRN:      [id number]        Account:      [id number]   :      1975           Service Date: 2021      Document: [id number]

## 2021-03-16 NOTE — H&P
Pre-Operative H & P     CC:  Preoperative exam to assess for increased cardiopulmonary risk while undergoing surgery and anesthesia.    Date of Encounter: 3/16/2021  Primary Care Physician:  No Ref-Primary, Physician  Reason for visit: Pituitary macroadenoma (H) [D35.2]  HPI  Vincenzo Avery is a 45 year old male who presents for pre-operative H & P in preparation for stealth assisted Right frontotemporal craniotomy and resection of tumor, INSERTION, DRAIN, SPINE, LUMBAR with Evan Neely and Sudhir on 3/22/21 at [facility]. History is obtained from the patient and medical records.     At the beginning of this visit patient ID was confirmed using name and .     Video-Visit Details    Type of service:  Video Visit    Patient verbally consented to video service today: YES      Video Start Time: 12:36pm   Video End Time (time video stopped): 12:46pm     Originating Location (pt. Location): Home    Distant Location (provider location):  [facility]    Mode of Communication:  Video Conference via Doximity-patient preference    Patient with history of pituitary adenoma s/p transsphenoidal resection in 2018. He has continued to follow locally and with Dr. Neely with rapid growth of residual tumor. He is having progressive right head pain and severe ear ringing. He reports that he can only be up for about 4 hours at a time. If he doesn't lay down and sleep his vision will start to be affected. He has been counseled for above procedure.    His history is otherwise significant for migraines and kidney stones. He believes he has sleep apnea but has never had a sleep study or evaluation. No CPAP. He reports that it runs in his family.     Today patient denies fever, cough, shortness of breath, chest pain, irregular HR, or ankle edema.       Past Medical History  Past Medical History:   Diagnosis Date     History of kidney stones      Migraines       NIC (obstructive sleep apnea)     Does not use CPAP     Pituitary mass (H)        Past Surgical History  Past Surgical History:   Procedure Laterality Date     DENTAL SURGERY      [place] teeth extraction     HAND SURGERY Right     Repair of fracture     OPTICAL TRACKING SYSTEM ENDOSCOPIC RESECTION TUMOR CRANIAL N/A 6/29/2018    Procedure: OPTICAL TRACKING SYSTEM ENDOSCOPIC RESECTION TUMOR CRANIAL;  Stealth Assisted Endoscopic Transnasal Resection Of Pituitary Tumor;  Surgeon: Jo-Ann Green MD;  Location: [facility] OR     ORCHIECTOMY SCROTAL Right        Hx of Blood transfusions/reactions: Denies.      Hx of abnormal bleeding or anti-platelet use: Denies.     Menstrual history: No LMP for male patient.    Steroid use in the last year: Denies.     Personal or FH with difficulty with Anesthesia:  Denies.     Prior to Admission Medications  No current outpatient medications on file.       Allergies  Allergies   Allergen Reactions     Morphine      Patient has significant nausea/vomiting with IV morphine.         Social History  Social History     Socioeconomic History     Marital status: Single     Spouse name: Not on file     Number of children: Not on file     Years of education: Not on file     Highest education level: Not on file   Occupational History     Not on file   Social Needs     Financial resource strain: Not on file     Food insecurity     Worry: Not on file     Inability: Not on file     Transportation needs     Medical: Not on file     Non-medical: Not on file   Tobacco Use     Smoking status: Never Smoker     Smokeless tobacco: Never Used   Substance and Sexual Activity     Alcohol use: Yes     Comment: Rarely     Drug use: Yes     Types: Marijuana     Comment: Daily marijuana use for pain/headaches     Sexual activity: Not on file   Lifestyle     Physical activity     Days per week: Not on file     Minutes per session: Not on file     Stress: Not on file   Relationships     Social  connections     Talks on phone: Not on file     Gets together: Not on file     Attends Gnosticist service: Not on file     Active member of club or organization: Not on file     Attends meetings of clubs or organizations: Not on file     Relationship status: Not on file     Intimate partner violence     Fear of current or ex partner: Not on file     Emotionally abused: Not on file     Physically abused: Not on file     Forced sexual activity: Not on file   Other Topics Concern     Not on file   Social History Narrative     Not on file       Family History  Family History   Problem Relation Age of Onset     Cerebrovascular Disease Mother      Diabetes Mother      Hypertension Mother      Back Pain Father         Degenerative joint/disc disease     Other - See Comments Sister         Injuries sustained in an MVC     Hypertension Brother      Obesity Brother      Cerebrovascular Disease Maternal Grandmother      Breast Cancer Maternal Grandmother      Hypertension Maternal Grandfather      Medical History Unknown Paternal Grandmother      No Known Problems Sister      Medical History Unknown Paternal Grandfather        ROS/MED HISTORY  The complete review of systems is negative other than noted in the HPI or here.   ENT/Pulmonary: Comment: Patient believes he has NIC but has never been tested. Family history. No CPAP.    (+) NIC risk factors, observed stopped breathing,  (-) tobacco use   Neurologic: Comment: Constant ear ringing and right head pain.       Cardiovascular:  - neg cardiovascular ROS   (+) -----Previous cardiac testing   Echo: Date: Results:    Stress Test: Date: Results:    ECG Reviewed: Date: 2018 Results:  SR 1st degree AV block, nonspecific T wave abnormality  Cath: Date: Results:      METS/Exercise Tolerance: 4 - Raking leaves, gardening    Hematologic:  - neg hematologic  ROS     Musculoskeletal:  - neg musculoskeletal ROS     GI/Hepatic:  - neg GI/hepatic ROS     Renal/Genitourinary:  - neg Renal  ROS     Endo: Comment: Pituitary macroadenoma      Psychiatric/Substance Use: Comment: Smoking marijuana to dull pain several times daily.      Infectious Disease:  - neg infectious disease ROS     Malignancy:  - neg malignancy ROS     Other:  - neg other ROS           Physical Exam  Constitutional: Awake, alert, no apparent distress, and appears stated age.  Respiratory: No cough or obvious dyspnea.  Neuropsychiatric: Calm, cooperative. Normal affect.     Please refer to the physical examination documented by the anesthesiologist in the anesthesia record on the day of surgery  Labs: No updated labs available today.  EKG: Personally reviewed 2018 Sinus rhythm, 1st degree AV block, nonspecific T wave abnormality    MRI 3/4/21  Impression:  Stable, chronic post surgical changes of transsphenoidal approach sellar surgery. Redemonstration of the large residual enhancing tumor mass centered within the cavernous sinus, middle cranial fossa and posterior suprasellar region. It is unchanged in size in compared to the prior exam. There is continued patency of the right intracranial internal carotid artery, which is completely encased by tumor. Stable mass affect upon multiple adjacent structures.  No other significant intracranial abnormalities.     Imaging reviewed by this provider    ASSESSMENT and PLAN  Vincenzo Avery is a 45 year old male scheduled to undergo Right frontotemporal craniotomy and resection of tumor, INSERTION, DRAIN, SPINE, LUMBAR with Evan Neely and Sudhir on 3/22/21. He has the following specific operative considerations:   - RCRI : No serious cardiac risks.    - Anesthesia considerations:  Refer to PAC assessment in anesthesia records  - VTE risk: 0.5%  - NIC # of risks 4-5/8 = Likely high risk for NIC  - Risk of PONV score = 2.  If > 2, anti-emetic intervention recommended.    --Pituitary macroadenoma s/p transsphenoidal resection in 2018. Rapid growth of residual tumor noted in follow up. Severe  right head pain and ear ringing. Above procedure now planned. Is smoking marijuana several times daily to dull pain.  --Patient reports that in the past, he has pulled at lines, etc when first waking from anesthesia. He also reports past experience with liquid morphine and he was unable to eat due to GI distress.   --No cardiac history, symptoms or meds. EKG above. Good activity tolerance. Works on farm.   --Nonsmoker. Believes he has NIC but has not been tested. No CPAP. Will require careful monitoring in the post op period.   --History of kidney stones with no recurrence.   --Denies history of blood transfusion.     Dr. Neely has a plan to check cortisol and thyroid profile locally prior to surgery. Basic lab update including type and screen on DOS.    Arrival time, NPO, shower and medication instructions provided by nursing staff today.       YULIYA Mix CNS  Preoperative Assessment Center  [facility]  Phone: [phone]  Fax: [phone]

## 2021-03-16 NOTE — LETTER
3/16/2021      RE: Vincenzo PAREKH [address]  [facility] [address]       Vincenzo is a 45 year old who is being evaluated via a billable telephone visit.      What phone number would you like to be contacted at? [phone]  How would you like to obtain your AVS? MAIL  Phone call duration: 10 minutes    Headaches restarted last Summer. Lying down improves. Develops tinnitus along with the headaches. Sleeps much of the day. Fatigued. Before last June, tinnitus was intermittent. Bitemporal headaches. No hair loss. Appetite is good and some weight gain. 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   Eye exam 3/4/21 at Dr. Meneses.  Will check cortisol and thyroid profile locally prior to surgery.  MD Rolf Gilmore MD

## 2021-03-16 NOTE — PROGRESS NOTES
Vincenzo is a 45 year old who is being evaluated via a billable telephone visit.      What phone number would you like to be contacted at? [phone]  How would you like to obtain your AVS? MAIL  Phone call duration: 10 minutes    Headaches restarted last Summer. Lying down improves. Develops tinnitus along with the headaches. Sleeps much of the day. Fatigued. Before last June, tinnitus was intermittent. Bitemporal headaches. No hair loss. Appetite is good and some weight gain. 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   Eye exam 3/4/21 at Dr. Meneses.  Will check cortisol and thyroid profile locally prior to surgery.  ODALYS Neely MD

## 2021-03-16 NOTE — PATIENT INSTRUCTIONS
Preparing for Your Surgery      Name:  Vincenzo Avery   MRN:  [id number]   :  1975   Today's Date:  3/16/2021       Arriving for surgery:  Surgery date:  3/22/21  Arrival time:  5:30 am    Restrictions due to COVID 19:  One consistent visitor per patient is allowed.  The visitor will be allowed in the pre-op area.  Visitors are asked to leave the building during the surgery.  No ill visitors.  All visitors must wear face mask.     parking is available for anyone with mobility limitations or disabilities.  ([facility]  24 hours/ 7 days a week; [facility]  7 am- 3:30 pm, Mon- Fri)    Please come to:     [facility] [address]  [address] [place] [address]    - ? parking is available in front of the hospital      -    Please proceed to [address] on the 3rd floor. [phone]?     - ?If you are in need of directions, wheelchair or escort please stop at the Information Desk in the lobby.  Inform the information person that you are here for surgery; a wheelchair and escort to [address] will be provided.?     What can I eat or drink?  -  You may eat and drink normally for up to 8 hours before your surgery. (Until 11:30 pm)  -  You may have clear liquids until 2 hours before surgery. (Until 5:30 am)    Examples of clear liquids:  Water  Clear broth  Juices (apple, white grape, white cranberry  and cider) without pulp  Noncarbonated, powder based beverages  (lemonade and Ean-Aid)  Sodas (Sprite, 7-Up, ginger ale and seltzer)  Coffee or tea (without milk or cream)  Gatorade    -  No Alcohol for at least 24 hours before surgery     Which medicines can I take?    Hold Aspirin for 7 days before surgery.   Hold Multivitamins for 7 days before surgery.  Hold Supplements for 7 days before surgery.  Hold Ibuprofen (Advil, Motrin) for 1 day before surgery--unless otherwise directed by surgeon.  Hold Naproxen (Aleve) for 4 days before surgery.    How  do I prepare myself?  - Please take 2 showers before surgery using Scrubcare or Hibiclens soap.    Use this soap only from the neck to your toes.     Leave the soap on your skin for one minute--then rinse thoroughly.      You may use your own shampoo and conditioner; no other hair products.   - Please remove all jewelry and body piercings.  - No lotions, deodorants or fragrance.  - Bring your ID and insurance card.    - All patients are required to have a Covid-19 test within 4 days of surgery/procedure.      -Patients will be contacted by the [facility] scheduling team within 1 week of surgery to make an appointment.      - Patients may call the Scheduling team at [phone] if they have not been scheduled within 4 days of  surgery.      ALL PATIENTS GOING HOME THE SAME DAY OF SURGERY ARE REQUIRED TO HAVE A RESPONSIBLE ADULT TO DRIVE AND BE IN ATTENDANCE WITH THEM FOR 24 HOURS FOLLOWING SURGERY.    IF THE RESPONSIBLE ADULT IS REQUIRED FOR POST OP TEACHING THE POST OP RN WILL ASK THEM TO COME BACK TO THE RECOVERY AREA.    Questions or Concerns:    - For any questions regarding the day of surgery or your hospital stay, please contact the Pre Admission Nursing Office at [phone].       - If you have health changes between today and your surgery please call your surgeon.       For questions after surgery please call your surgeons office.

## 2021-03-16 NOTE — PROGRESS NOTES
Vincenzo is a 45 year old who is being evaluated via a billable video visit.      How would you like to obtain your AVS? Mail a copy  If the video visit is dropped, the invitation should be resent by: Text to cell phone: [phone]  Will anyone else be joining your video visit? No      HPI         Review of Systems         Objective    Vitals - Patient Reported  Pain Score: Moderate Pain (5)        Physical Exam     HILARIO Segovia LPN

## 2021-03-17 ENCOUNTER — TELEPHONE (OUTPATIENT)
Dept: NEUROSURGERY | Facility: CLINIC | Age: 46
End: 2021-03-17

## 2021-03-17 NOTE — TELEPHONE ENCOUNTER
Per patient fax order to [facility] location     Phone: [phone]  Fax:[phone]      [facility] informed orders faxed.     Alesia Scales LPN  Neurosurgery

## 2021-03-17 NOTE — TELEPHONE ENCOUNTER
Health Call Center    Phone Message    May a detailed message be left on voicemail: yes     Reason for Call: Order(s): Other:   Reason for requested: Covid test   Date needed: 3/18/21 please  Provider name: Dr. Neely    Please send order for covid test to [facility] in [place].  Please let Gay know that order was sent please.  Thank you.      Action Taken: Message routed to:  [facility] ([facility]): [facility]    Travel Screening: Not Applicable

## 2021-03-22 ENCOUNTER — ANESTHESIA (OUTPATIENT)
Dept: SURGERY | Facility: CLINIC | Age: 46
End: 2021-03-22
Payer: COMMERCIAL

## 2021-03-22 ENCOUNTER — HOSPITAL ENCOUNTER (OUTPATIENT)
Dept: MRI IMAGING | Facility: CLINIC | Age: 46
End: 2021-03-22
Attending: NEUROLOGICAL SURGERY | Admitting: NEUROLOGICAL SURGERY
Payer: COMMERCIAL

## 2021-03-22 ENCOUNTER — HOSPITAL ENCOUNTER (INPATIENT)
Facility: CLINIC | Age: 46
LOS: 4 days | Discharge: HOME | End: 2021-03-26
Attending: NEUROLOGICAL SURGERY | Admitting: NEUROLOGICAL SURGERY
Payer: COMMERCIAL

## 2021-03-22 ENCOUNTER — HOSPITAL ENCOUNTER (OUTPATIENT)
Dept: CT IMAGING | Facility: CLINIC | Age: 46
End: 2021-03-22
Attending: NEUROLOGICAL SURGERY | Admitting: NEUROLOGICAL SURGERY
Payer: COMMERCIAL

## 2021-03-22 DIAGNOSIS — D35.2 PITUITARY MACROADENOMA (H): ICD-10-CM

## 2021-03-22 LAB
ABO + RH BLD: NORMAL
ABO + RH BLD: NORMAL
BASE DEFICIT BLDA-SCNC: 0.4 MMOL/L
BASE DEFICIT BLDA-SCNC: 0.7 MMOL/L
BLD GP AB SCN SERPL QL: NORMAL
BLOOD BANK CMNT PATIENT-IMP: NORMAL
CA-I BLD-MCNC: 4.5 MG/DL (ref 4.4–5.2)
CA-I BLD-MCNC: 4.5 MG/DL (ref 4.4–5.2)
CREAT SERPL-MCNC: 1.03 MG/DL (ref 0.66–1.25)
GFR SERPL CREATININE-BSD FRML MDRD: 86 ML/MIN/{1.73_M2}
GLUCOSE BLD-MCNC: 105 MG/DL (ref 70–99)
GLUCOSE BLD-MCNC: 155 MG/DL (ref 70–99)
GLUCOSE BLDC GLUCOMTR-MCNC: 92 MG/DL (ref 70–99)
HCO3 BLD-SCNC: 25 MMOL/L (ref 21–28)
HCO3 BLD-SCNC: 26 MMOL/L (ref 21–28)
HGB BLD-MCNC: 11.2 G/DL (ref 13.3–17.7)
HGB BLD-MCNC: 11.3 G/DL (ref 13.3–17.7)
HGB BLD-MCNC: 13.2 G/DL (ref 13.3–17.7)
INR PPP: 0.9 (ref 0.86–1.14)
LABORATORY COMMENT REPORT: NORMAL
LACTATE BLD-SCNC: 0.9 MMOL/L (ref 0.7–2)
O2/TOTAL GAS SETTING VFR VENT: 50 %
O2/TOTAL GAS SETTING VFR VENT: 60 %
PCO2 BLD: 42 MM HG (ref 35–45)
PCO2 BLD: 48 MM HG (ref 35–45)
PH BLD: 7.34 PH (ref 7.35–7.45)
PH BLD: 7.38 PH (ref 7.35–7.45)
PO2 BLD: 114 MM HG (ref 80–105)
PO2 BLD: 99 MM HG (ref 80–105)
POTASSIUM BLD-SCNC: 3.7 MMOL/L (ref 3.4–5.3)
POTASSIUM BLD-SCNC: 4.5 MMOL/L (ref 3.4–5.3)
POTASSIUM SERPL-SCNC: 4 MMOL/L (ref 3.4–5.3)
SARS-COV-2 RNA RESP QL NAA+PROBE: NEGATIVE
SODIUM BLD-SCNC: 136 MMOL/L (ref 133–144)
SODIUM BLD-SCNC: 141 MMOL/L (ref 133–144)
SPECIMEN EXP DATE BLD: NORMAL
SPECIMEN SOURCE: NORMAL

## 2021-03-22 PROCEDURE — 88307 TISSUE EXAM BY PATHOLOGIST: CPT | Mod: 26 | Performed by: PATHOLOGY

## 2021-03-22 PROCEDURE — 999N000157 HC STATISTIC RCP TIME EA 10 MIN

## 2021-03-22 PROCEDURE — 250N000025 HC SEVOFLURANE, PER MIN: Performed by: NEUROLOGICAL SURGERY

## 2021-03-22 PROCEDURE — 88305 TISSUE EXAM BY PATHOLOGIST: CPT | Mod: TC | Performed by: NEUROLOGICAL SURGERY

## 2021-03-22 PROCEDURE — 88342 IMHCHEM/IMCYTCHM 1ST ANTB: CPT | Mod: TC | Performed by: NEUROLOGICAL SURGERY

## 2021-03-22 PROCEDURE — U0005 INFEC AGEN DETEC AMPLI PROBE: HCPCS | Performed by: NEUROLOGICAL SURGERY

## 2021-03-22 PROCEDURE — 82947 ASSAY GLUCOSE BLOOD QUANT: CPT | Performed by: NEUROLOGICAL SURGERY

## 2021-03-22 PROCEDURE — 82565 ASSAY OF CREATININE: CPT | Performed by: CLINICAL NURSE SPECIALIST

## 2021-03-22 PROCEDURE — C1762 CONN TISS, HUMAN(INC FASCIA): HCPCS | Performed by: NEUROLOGICAL SURGERY

## 2021-03-22 PROCEDURE — 85018 HEMOGLOBIN: CPT | Performed by: CLINICAL NURSE SPECIALIST

## 2021-03-22 PROCEDURE — 258N000003 HC RX IP 258 OP 636

## 2021-03-22 PROCEDURE — 272N000002 HC OR SUPPLY OTHER OPNP: Performed by: NEUROLOGICAL SURGERY

## 2021-03-22 PROCEDURE — 370N000017 HC ANESTHESIA TECHNICAL FEE, PER MIN: Performed by: NEUROLOGICAL SURGERY

## 2021-03-22 PROCEDURE — 710N000011 HC RECOVERY PHASE 1, LEVEL 3, PER MIN: Performed by: NEUROLOGICAL SURGERY

## 2021-03-22 PROCEDURE — 360N000079 HC SURGERY LEVEL 6, PER MIN: Performed by: NEUROLOGICAL SURGERY

## 2021-03-22 PROCEDURE — 85610 PROTHROMBIN TIME: CPT | Performed by: CLINICAL NURSE SPECIALIST

## 2021-03-22 PROCEDURE — 0GB00ZZ EXCISION OF PITUITARY GLAND, OPEN APPROACH: ICD-10-PCS | Performed by: NEUROLOGICAL SURGERY

## 2021-03-22 PROCEDURE — 255N000002 HC RX 255 OP 636: Performed by: NEUROLOGICAL SURGERY

## 2021-03-22 PROCEDURE — 70543 MRI ORBT/FAC/NCK W/O &W/DYE: CPT | Mod: 26 | Performed by: RADIOLOGY

## 2021-03-22 PROCEDURE — C1713 ANCHOR/SCREW BN/BN,TIS/BN: HCPCS | Performed by: NEUROLOGICAL SURGERY

## 2021-03-22 PROCEDURE — 88331 PATH CONSLTJ SURG 1 BLK 1SPC: CPT | Mod: 26 | Performed by: PATHOLOGY

## 2021-03-22 PROCEDURE — 999N001017 HC STATISTIC GLUCOSE BY METER IP

## 2021-03-22 PROCEDURE — 999N000015 HC STATISTIC ARTERIAL MONITORING DAILY

## 2021-03-22 PROCEDURE — 250N000009 HC RX 250

## 2021-03-22 PROCEDURE — 88341 IMHCHEM/IMCYTCHM EA ADD ANTB: CPT | Mod: TC | Performed by: NEUROLOGICAL SURGERY

## 2021-03-22 PROCEDURE — 88307 TISSUE EXAM BY PATHOLOGIST: CPT | Mod: TC | Performed by: NEUROLOGICAL SURGERY

## 2021-03-22 PROCEDURE — 88331 PATH CONSLTJ SURG 1 BLK 1SPC: CPT | Mod: TC | Performed by: NEUROLOGICAL SURGERY

## 2021-03-22 PROCEDURE — 272N000004 HC RX 272: Performed by: NEUROLOGICAL SURGERY

## 2021-03-22 PROCEDURE — 200N000002 HC R&B ICU UMMC

## 2021-03-22 PROCEDURE — 250N000009 HC RX 250: Performed by: STUDENT IN AN ORGANIZED HEALTH CARE EDUCATION/TRAINING PROGRAM

## 2021-03-22 PROCEDURE — 86850 RBC ANTIBODY SCREEN: CPT | Performed by: CLINICAL NURSE SPECIALIST

## 2021-03-22 PROCEDURE — U0003 INFECTIOUS AGENT DETECTION BY NUCLEIC ACID (DNA OR RNA); SEVERE ACUTE RESPIRATORY SYNDROME CORONAVIRUS 2 (SARS-COV-2) (CORONAVIRUS DISEASE [COVID-19]), AMPLIFIED PROBE TECHNIQUE, MAKING USE OF HIGH THROUGHPUT TECHNOLOGIES AS DESCRIBED BY CMS-2020-01-R: HCPCS | Performed by: NEUROLOGICAL SURGERY

## 2021-03-22 PROCEDURE — 88341 IMHCHEM/IMCYTCHM EA ADD ANTB: CPT | Mod: 26 | Performed by: PATHOLOGY

## 2021-03-22 PROCEDURE — 36415 COLL VENOUS BLD VENIPUNCTURE: CPT | Performed by: CLINICAL NURSE SPECIALIST

## 2021-03-22 PROCEDURE — 82330 ASSAY OF CALCIUM: CPT | Performed by: NEUROLOGICAL SURGERY

## 2021-03-22 PROCEDURE — 250N000009 HC RX 250: Performed by: NEUROLOGICAL SURGERY

## 2021-03-22 PROCEDURE — 84132 ASSAY OF SERUM POTASSIUM: CPT | Performed by: CLINICAL NURSE SPECIALIST

## 2021-03-22 PROCEDURE — 86901 BLOOD TYPING SEROLOGIC RH(D): CPT | Performed by: CLINICAL NURSE SPECIALIST

## 2021-03-22 PROCEDURE — A9585 GADOBUTROL INJECTION: HCPCS | Performed by: NEUROLOGICAL SURGERY

## 2021-03-22 PROCEDURE — 250N000011 HC RX IP 250 OP 636

## 2021-03-22 PROCEDURE — 70450 CT HEAD/BRAIN W/O DYE: CPT

## 2021-03-22 PROCEDURE — 70450 CT HEAD/BRAIN W/O DYE: CPT | Mod: 26 | Performed by: RADIOLOGY

## 2021-03-22 PROCEDURE — 4A11X4G MONITORING OF PERIPHERAL NERVOUS ELECTRICAL ACTIVITY, INTRAOPERATIVE, EXTERNAL APPROACH: ICD-10-PCS | Performed by: NEUROLOGICAL SURGERY

## 2021-03-22 PROCEDURE — 88342 IMHCHEM/IMCYTCHM 1ST ANTB: CPT | Mod: 26 | Performed by: PATHOLOGY

## 2021-03-22 PROCEDURE — 250N000011 HC RX IP 250 OP 636: Performed by: NEUROLOGICAL SURGERY

## 2021-03-22 PROCEDURE — 272N000001 HC OR GENERAL SUPPLY STERILE: Performed by: NEUROLOGICAL SURGERY

## 2021-03-22 PROCEDURE — 88305 TISSUE EXAM BY PATHOLOGIST: CPT | Mod: 26 | Performed by: PATHOLOGY

## 2021-03-22 PROCEDURE — 8E09XBZ COMPUTER ASSISTED PROCEDURE OF HEAD AND NECK REGION: ICD-10-PCS | Performed by: NEUROLOGICAL SURGERY

## 2021-03-22 PROCEDURE — 70543 MRI ORBT/FAC/NCK W/O &W/DYE: CPT

## 2021-03-22 PROCEDURE — 83605 ASSAY OF LACTIC ACID: CPT | Performed by: NEUROLOGICAL SURGERY

## 2021-03-22 PROCEDURE — 999N000141 HC STATISTIC PRE-PROCEDURE NURSING ASSESSMENT: Performed by: NEUROLOGICAL SURGERY

## 2021-03-22 PROCEDURE — 84295 ASSAY OF SERUM SODIUM: CPT | Performed by: NEUROLOGICAL SURGERY

## 2021-03-22 PROCEDURE — 82803 BLOOD GASES ANY COMBINATION: CPT | Performed by: NEUROLOGICAL SURGERY

## 2021-03-22 PROCEDURE — 278N000051 HC OR IMPLANT GENERAL: Performed by: NEUROLOGICAL SURGERY

## 2021-03-22 PROCEDURE — 86900 BLOOD TYPING SEROLOGIC ABO: CPT | Performed by: CLINICAL NURSE SPECIALIST

## 2021-03-22 PROCEDURE — 84132 ASSAY OF SERUM POTASSIUM: CPT | Performed by: NEUROLOGICAL SURGERY

## 2021-03-22 DEVICE — IMP PLATE SYN STR DOG BONE LOW PROFILE 4H TI 421.504: Type: IMPLANTABLE DEVICE | Site: CRANIAL | Status: FUNCTIONAL

## 2021-03-22 DEVICE — GRAFT DUREPAIR DURA MATRIX 3X3" 62105: Type: IMPLANTABLE DEVICE | Site: CRANIAL | Status: FUNCTIONAL

## 2021-03-22 DEVICE — IMP SCR SYN MATRIX LOW PRO 1.5X04MM SELF DRILL 04.503.104.01: Type: IMPLANTABLE DEVICE | Site: CRANIAL | Status: FUNCTIONAL

## 2021-03-22 DEVICE — IMP BUR HOLE COVER 17MM LOW PROFILE TI 421.527: Type: IMPLANTABLE DEVICE | Site: CRANIAL | Status: FUNCTIONAL

## 2021-03-22 DEVICE — IMP PLATE SYN MATRIX MESH ARC SHAPE LG 0.6MM 04.503.124: Type: IMPLANTABLE DEVICE | Site: CRANIAL | Status: FUNCTIONAL

## 2021-03-22 RX ORDER — CEFAZOLIN SODIUM 1 G/3ML
INJECTION, POWDER, FOR SOLUTION INTRAMUSCULAR; INTRAVENOUS PRN
Status: DISCONTINUED | OUTPATIENT
Start: 2021-03-22 | End: 2021-03-22

## 2021-03-22 RX ORDER — DEXAMETHASONE SODIUM PHOSPHATE 4 MG/ML
INJECTION, SOLUTION INTRA-ARTICULAR; INTRALESIONAL; INTRAMUSCULAR; INTRAVENOUS; SOFT TISSUE PRN
Status: DISCONTINUED | OUTPATIENT
Start: 2021-03-22 | End: 2021-03-22

## 2021-03-22 RX ORDER — ESMOLOL HYDROCHLORIDE 10 MG/ML
INJECTION INTRAVENOUS PRN
Status: DISCONTINUED | OUTPATIENT
Start: 2021-03-22 | End: 2021-03-22

## 2021-03-22 RX ORDER — SODIUM CHLORIDE, SODIUM LACTATE, POTASSIUM CHLORIDE, CALCIUM CHLORIDE 600; 310; 30; 20 MG/100ML; MG/100ML; MG/100ML; MG/100ML
INJECTION, SOLUTION INTRAVENOUS CONTINUOUS PRN
Status: DISCONTINUED | OUTPATIENT
Start: 2021-03-22 | End: 2021-03-22

## 2021-03-22 RX ORDER — EPHEDRINE SULFATE 50 MG/ML
INJECTION, SOLUTION INTRAMUSCULAR; INTRAVENOUS; SUBCUTANEOUS PRN
Status: DISCONTINUED | OUTPATIENT
Start: 2021-03-22 | End: 2021-03-22

## 2021-03-22 RX ORDER — NALOXONE HYDROCHLORIDE 0.4 MG/ML
0.2 INJECTION, SOLUTION INTRAMUSCULAR; INTRAVENOUS; SUBCUTANEOUS
Status: DISCONTINUED | OUTPATIENT
Start: 2021-03-22 | End: 2021-03-23 | Stop reason: HOSPADM

## 2021-03-22 RX ORDER — HYDROMORPHONE HYDROCHLORIDE 1 MG/ML
.3-.5 INJECTION, SOLUTION INTRAMUSCULAR; INTRAVENOUS; SUBCUTANEOUS EVERY 5 MIN PRN
Status: DISCONTINUED | OUTPATIENT
Start: 2021-03-22 | End: 2021-03-23 | Stop reason: HOSPADM

## 2021-03-22 RX ORDER — ONDANSETRON 2 MG/ML
4 INJECTION INTRAMUSCULAR; INTRAVENOUS EVERY 30 MIN PRN
Status: CANCELLED | OUTPATIENT
Start: 2021-03-22

## 2021-03-22 RX ORDER — ONDANSETRON 2 MG/ML
INJECTION INTRAMUSCULAR; INTRAVENOUS PRN
Status: DISCONTINUED | OUTPATIENT
Start: 2021-03-22 | End: 2021-03-22

## 2021-03-22 RX ORDER — LIDOCAINE HYDROCHLORIDE AND EPINEPHRINE 10; 10 MG/ML; UG/ML
INJECTION, SOLUTION INFILTRATION; PERINEURAL PRN
Status: DISCONTINUED | OUTPATIENT
Start: 2021-03-22 | End: 2021-03-23 | Stop reason: HOSPADM

## 2021-03-22 RX ORDER — ONDANSETRON 4 MG/1
4 TABLET, ORALLY DISINTEGRATING ORAL EVERY 30 MIN PRN
Status: CANCELLED | OUTPATIENT
Start: 2021-03-22

## 2021-03-22 RX ORDER — FENTANYL CITRATE 50 UG/ML
INJECTION, SOLUTION INTRAMUSCULAR; INTRAVENOUS PRN
Status: DISCONTINUED | OUTPATIENT
Start: 2021-03-22 | End: 2021-03-22

## 2021-03-22 RX ORDER — LIDOCAINE HYDROCHLORIDE 20 MG/ML
INJECTION, SOLUTION INFILTRATION; PERINEURAL PRN
Status: DISCONTINUED | OUTPATIENT
Start: 2021-03-22 | End: 2021-03-22

## 2021-03-22 RX ORDER — PROPOFOL 10 MG/ML
INJECTION, EMULSION INTRAVENOUS CONTINUOUS PRN
Status: DISCONTINUED | OUTPATIENT
Start: 2021-03-22 | End: 2021-03-22

## 2021-03-22 RX ORDER — PROPOFOL 10 MG/ML
INJECTION, EMULSION INTRAVENOUS PRN
Status: DISCONTINUED | OUTPATIENT
Start: 2021-03-22 | End: 2021-03-22

## 2021-03-22 RX ORDER — GLYCOPYRROLATE 0.2 MG/ML
INJECTION, SOLUTION INTRAMUSCULAR; INTRAVENOUS PRN
Status: DISCONTINUED | OUTPATIENT
Start: 2021-03-22 | End: 2021-03-22

## 2021-03-22 RX ORDER — NALOXONE HYDROCHLORIDE 0.4 MG/ML
0.4 INJECTION, SOLUTION INTRAMUSCULAR; INTRAVENOUS; SUBCUTANEOUS
Status: DISCONTINUED | OUTPATIENT
Start: 2021-03-22 | End: 2021-03-23 | Stop reason: HOSPADM

## 2021-03-22 RX ORDER — GADOBUTROL 604.72 MG/ML
10 INJECTION INTRAVENOUS ONCE
Status: COMPLETED | OUTPATIENT
Start: 2021-03-22 | End: 2021-03-22

## 2021-03-22 RX ORDER — LEVETIRACETAM 10 MG/ML
1000 INJECTION INTRAVASCULAR ONCE
Status: COMPLETED | OUTPATIENT
Start: 2021-03-22 | End: 2021-03-22

## 2021-03-22 RX ORDER — MANNITOL 20 G/100ML
INJECTION, SOLUTION INTRAVENOUS PRN
Status: DISCONTINUED | OUTPATIENT
Start: 2021-03-22 | End: 2021-03-22

## 2021-03-22 RX ORDER — CEFAZOLIN SODIUM 2 G/100ML
INJECTION, SOLUTION INTRAVENOUS PRN
Status: DISCONTINUED | OUTPATIENT
Start: 2021-03-22 | End: 2021-03-22

## 2021-03-22 RX ORDER — FENTANYL CITRATE 50 UG/ML
25-50 INJECTION, SOLUTION INTRAMUSCULAR; INTRAVENOUS
Status: CANCELLED | OUTPATIENT
Start: 2021-03-22

## 2021-03-22 RX ADMIN — PHENYLEPHRINE HYDROCHLORIDE 50 MCG: 10 INJECTION INTRAVENOUS at 17:17

## 2021-03-22 RX ADMIN — SODIUM CHLORIDE, POTASSIUM CHLORIDE, SODIUM LACTATE AND CALCIUM CHLORIDE: 600; 310; 30; 20 INJECTION, SOLUTION INTRAVENOUS at 10:49

## 2021-03-22 RX ADMIN — CEFAZOLIN 1 G: 1 INJECTION, POWDER, FOR SOLUTION INTRAMUSCULAR; INTRAVENOUS at 16:33

## 2021-03-22 RX ADMIN — SODIUM CHLORIDE, POTASSIUM CHLORIDE, SODIUM LACTATE AND CALCIUM CHLORIDE: 600; 310; 30; 20 INJECTION, SOLUTION INTRAVENOUS at 08:50

## 2021-03-22 RX ADMIN — PHENYLEPHRINE HYDROCHLORIDE 100 MCG: 10 INJECTION INTRAVENOUS at 19:28

## 2021-03-22 RX ADMIN — PHENYLEPHRINE HYDROCHLORIDE 100 MCG: 10 INJECTION INTRAVENOUS at 22:57

## 2021-03-22 RX ADMIN — Medication 5 MG: at 16:31

## 2021-03-22 RX ADMIN — Medication 100 MG: at 08:17

## 2021-03-22 RX ADMIN — GADOBUTROL 10 ML: 604.72 INJECTION INTRAVENOUS at 07:35

## 2021-03-22 RX ADMIN — REMIFENTANIL HYDROCHLORIDE: 1 INJECTION, POWDER, LYOPHILIZED, FOR SOLUTION INTRAVENOUS at 13:08

## 2021-03-22 RX ADMIN — ESMOLOL HYDROCHLORIDE 40 MG: 10 INJECTION, SOLUTION INTRAVENOUS at 23:40

## 2021-03-22 RX ADMIN — SODIUM CHLORIDE, POTASSIUM CHLORIDE, SODIUM LACTATE AND CALCIUM CHLORIDE: 600; 310; 30; 20 INJECTION, SOLUTION INTRAVENOUS at 08:03

## 2021-03-22 RX ADMIN — MIDAZOLAM 2 MG: 1 INJECTION INTRAMUSCULAR; INTRAVENOUS at 08:03

## 2021-03-22 RX ADMIN — MANNITOL 100 G: 20 INJECTION, SOLUTION INTRAVENOUS at 10:24

## 2021-03-22 RX ADMIN — REMIFENTANIL HYDROCHLORIDE: 1 INJECTION, POWDER, LYOPHILIZED, FOR SOLUTION INTRAVENOUS at 11:19

## 2021-03-22 RX ADMIN — FENTANYL CITRATE 250 MCG: 50 INJECTION, SOLUTION INTRAMUSCULAR; INTRAVENOUS at 08:17

## 2021-03-22 RX ADMIN — SODIUM CHLORIDE, POTASSIUM CHLORIDE, SODIUM LACTATE AND CALCIUM CHLORIDE: 600; 310; 30; 20 INJECTION, SOLUTION INTRAVENOUS at 10:20

## 2021-03-22 RX ADMIN — PHENYLEPHRINE HYDROCHLORIDE 100 MCG: 10 INJECTION INTRAVENOUS at 19:40

## 2021-03-22 RX ADMIN — PHENYLEPHRINE HYDROCHLORIDE 100 MCG: 10 INJECTION INTRAVENOUS at 21:13

## 2021-03-22 RX ADMIN — PHENYLEPHRINE HYDROCHLORIDE 100 MCG: 10 INJECTION INTRAVENOUS at 22:52

## 2021-03-22 RX ADMIN — Medication 2 G: at 08:50

## 2021-03-22 RX ADMIN — PHENYLEPHRINE HYDROCHLORIDE 100 MCG: 10 INJECTION INTRAVENOUS at 19:54

## 2021-03-22 RX ADMIN — PHENYLEPHRINE HYDROCHLORIDE 100 MCG: 10 INJECTION INTRAVENOUS at 20:06

## 2021-03-22 RX ADMIN — PHENYLEPHRINE HYDROCHLORIDE 100 MCG: 10 INJECTION INTRAVENOUS at 20:45

## 2021-03-22 RX ADMIN — REMIFENTANIL HYDROCHLORIDE: 1 INJECTION, POWDER, LYOPHILIZED, FOR SOLUTION INTRAVENOUS at 21:25

## 2021-03-22 RX ADMIN — SODIUM CHLORIDE, POTASSIUM CHLORIDE, SODIUM LACTATE AND CALCIUM CHLORIDE: 600; 310; 30; 20 INJECTION, SOLUTION INTRAVENOUS at 19:47

## 2021-03-22 RX ADMIN — PHENYLEPHRINE HYDROCHLORIDE 100 MCG: 10 INJECTION INTRAVENOUS at 22:38

## 2021-03-22 RX ADMIN — Medication 5 MG: at 08:49

## 2021-03-22 RX ADMIN — PHENYLEPHRINE HYDROCHLORIDE 100 MCG: 10 INJECTION INTRAVENOUS at 23:06

## 2021-03-22 RX ADMIN — DEXAMETHASONE SODIUM PHOSPHATE 10 MG: 4 INJECTION, SOLUTION INTRA-ARTICULAR; INTRALESIONAL; INTRAMUSCULAR; INTRAVENOUS; SOFT TISSUE at 10:22

## 2021-03-22 RX ADMIN — CEFAZOLIN 1 G: 1 INJECTION, POWDER, FOR SOLUTION INTRAMUSCULAR; INTRAVENOUS at 20:30

## 2021-03-22 RX ADMIN — ONDANSETRON 4 MG: 2 INJECTION INTRAMUSCULAR; INTRAVENOUS at 08:17

## 2021-03-22 RX ADMIN — Medication 5 MG: at 08:53

## 2021-03-22 RX ADMIN — PHENYLEPHRINE HYDROCHLORIDE 100 MCG: 10 INJECTION INTRAVENOUS at 18:54

## 2021-03-22 RX ADMIN — LIDOCAINE HYDROCHLORIDE 100 MG: 20 INJECTION, SOLUTION INFILTRATION; PERINEURAL at 23:30

## 2021-03-22 RX ADMIN — PHENYLEPHRINE HYDROCHLORIDE 100 MCG: 10 INJECTION INTRAVENOUS at 21:32

## 2021-03-22 RX ADMIN — CEFAZOLIN 1 G: 1 INJECTION, POWDER, FOR SOLUTION INTRAMUSCULAR; INTRAVENOUS at 14:33

## 2021-03-22 RX ADMIN — PHENYLEPHRINE HYDROCHLORIDE 100 MCG: 10 INJECTION INTRAVENOUS at 21:21

## 2021-03-22 RX ADMIN — REMIFENTANIL HYDROCHLORIDE 0.2 MCG/KG/MIN: 1 INJECTION, POWDER, LYOPHILIZED, FOR SOLUTION INTRAVENOUS at 08:27

## 2021-03-22 RX ADMIN — PROPOFOL 100 MCG/KG/MIN: 10 INJECTION, EMULSION INTRAVENOUS at 08:27

## 2021-03-22 RX ADMIN — Medication 10 MG: at 15:40

## 2021-03-22 RX ADMIN — Medication 5 MG: at 09:05

## 2021-03-22 RX ADMIN — CEFAZOLIN 1 G: 1 INJECTION, POWDER, FOR SOLUTION INTRAMUSCULAR; INTRAVENOUS at 22:30

## 2021-03-22 RX ADMIN — CEFAZOLIN 1 G: 1 INJECTION, POWDER, FOR SOLUTION INTRAMUSCULAR; INTRAVENOUS at 12:36

## 2021-03-22 RX ADMIN — ONDANSETRON 4 MG: 2 INJECTION INTRAMUSCULAR; INTRAVENOUS at 23:13

## 2021-03-22 RX ADMIN — CEFAZOLIN 1 G: 1 INJECTION, POWDER, FOR SOLUTION INTRAMUSCULAR; INTRAVENOUS at 18:33

## 2021-03-22 RX ADMIN — PHENYLEPHRINE HYDROCHLORIDE 100 MCG: 10 INJECTION INTRAVENOUS at 08:45

## 2021-03-22 RX ADMIN — PROPOFOL: 10 INJECTION, EMULSION INTRAVENOUS at 12:06

## 2021-03-22 RX ADMIN — LIDOCAINE HYDROCHLORIDE 100 MG: 20 INJECTION, SOLUTION INFILTRATION; PERINEURAL at 08:17

## 2021-03-22 RX ADMIN — FENTANYL CITRATE 50 MCG: 50 INJECTION, SOLUTION INTRAMUSCULAR; INTRAVENOUS at 22:34

## 2021-03-22 RX ADMIN — PROPOFOL 50 MG: 10 INJECTION, EMULSION INTRAVENOUS at 21:05

## 2021-03-22 RX ADMIN — LEVETIRACETAM 1 G: 10 INJECTION INTRAVENOUS at 09:03

## 2021-03-22 RX ADMIN — PROPOFOL 50 MG: 10 INJECTION, EMULSION INTRAVENOUS at 10:54

## 2021-03-22 RX ADMIN — PHENYLEPHRINE HYDROCHLORIDE 100 MCG: 10 INJECTION INTRAVENOUS at 23:18

## 2021-03-22 RX ADMIN — GLYCOPYRROLATE 0.2 MG: 0.2 INJECTION, SOLUTION INTRAMUSCULAR; INTRAVENOUS at 09:14

## 2021-03-22 RX ADMIN — PROPOFOL 50 MG: 10 INJECTION, EMULSION INTRAVENOUS at 09:57

## 2021-03-22 RX ADMIN — PHENYLEPHRINE HYDROCHLORIDE 100 MCG: 10 INJECTION INTRAVENOUS at 21:17

## 2021-03-22 RX ADMIN — GLYCOPYRROLATE 0.2 MG: 0.2 INJECTION, SOLUTION INTRAMUSCULAR; INTRAVENOUS at 09:23

## 2021-03-22 RX ADMIN — PROPOFOL 150 MG: 10 INJECTION, EMULSION INTRAVENOUS at 08:17

## 2021-03-22 RX ADMIN — REMIFENTANIL HYDROCHLORIDE: 1 INJECTION, POWDER, LYOPHILIZED, FOR SOLUTION INTRAVENOUS at 17:17

## 2021-03-22 RX ADMIN — CEFAZOLIN 1 G: 1 INJECTION, POWDER, FOR SOLUTION INTRAMUSCULAR; INTRAVENOUS at 10:48

## 2021-03-22 RX ADMIN — Medication 5 MG: at 21:21

## 2021-03-22 RX ADMIN — FENTANYL CITRATE 100 MCG: 50 INJECTION, SOLUTION INTRAMUSCULAR; INTRAVENOUS at 21:07

## 2021-03-22 RX ADMIN — SODIUM CHLORIDE, POTASSIUM CHLORIDE, SODIUM LACTATE AND CALCIUM CHLORIDE: 600; 310; 30; 20 INJECTION, SOLUTION INTRAVENOUS at 14:05

## 2021-03-22 RX ADMIN — ESMOLOL HYDROCHLORIDE 30 MG: 10 INJECTION, SOLUTION INTRAVENOUS at 23:43

## 2021-03-22 RX ADMIN — PHENYLEPHRINE HYDROCHLORIDE 100 MCG: 10 INJECTION INTRAVENOUS at 19:34

## 2021-03-22 ASSESSMENT — MIFFLIN-ST. JEOR: SCORE: 1887.38

## 2021-03-22 ASSESSMENT — VISUAL ACUITY: OU: OTHER (SEE COMMENT)

## 2021-03-22 NOTE — OR NURSING
Pt has complaints of ringing in bilateral ears.  Head tightness left and rt head. Pt states that with his left eye he can only see what is in front of him.  Asked for lights to be dimmed.  Pt stated also if he is up longer than  2 hrs he starts having visual problems along with blurry vision.

## 2021-03-22 NOTE — PROGRESS NOTES
Patient examined pre-operatively. Cranial nerves examined.    Right eye: no visual field deficits grossly. 20/40 acuity.  Left eye: complete temporal defect; 20/200 vision.   Pupils 3mm and reactive bilateral, no APD  No deficits of extraocular movement, no subjective reports of diplopia.     John M. Leschke, MD

## 2021-03-22 NOTE — PROGRESS NOTES
Service Date: 2021      MD Dr. Sunday Pagan and Gideon   [address] [id number]      RE: Vincenzo Avery                                      MRN: [id number]   : 1975      Dear Dr. Meneses:      We spoke to Mr. Avery as part of a telephone visit on 2021 to discuss his upcoming surgery for his recurrent pituitary macroadenoma.  As you know, he is about 3 years out from an endoscopic transnasal approach for subtotal resection of a large pituitary adenoma.  We resected the central part of the tumor and decompressed the optic apparatus.  However, we left the right parasellar component behind.  This component has grown progressively and he has now developed an intradural component that extends posteriorly towards the interpeduncular cistern.  He is not on any hormone replacement.  He has had fatigue since the first operation.  He sleeps much of the day.  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      His headaches restarted last summer and lying down makes them better.  He also has tinnitus bilaterally.  The tinnitus is now constant.  Prior to last summer, the tinnitus with intermittent.  His headaches are mostly bitemporal.      On endocrinological review of symptoms, he describes no hair loss.  His appetite has been good.  Because he has been sedentary, he has gained some weight.  He had an eye exam earlier this month with you.  Although your note was not available to us, Mr. Avery reports that his vision is unchanged.  Specifically, he denies any problems with his right-sided vision. His left sided vision remains poor.     REVIEW OF STUDIES:  We went over his MRI from earlier this month.  We had previously discussed this with his partner in a separate phone call last month.  The right parasellar component has a large exophytic component that has grown.  There is also contact with the right side of the optic chiasm.  There is an  intradural component of tumor that extends towards the third ventricle floor and right optic tract.  It extends towards the interpeduncular cistern and makes contact with the right cerebral peduncle.  In total, this residual tumor that has grown is over 4 cm in size.  There is no recurrence of the intrasellar component.      IMPRESSION AND PLAN:  Mr. Avery is already scheduled for a transcranial approach for 2021.  We are planning a right frontotemporal craniotomy and with a transzygomatic osteotomy.  We will then debulk as much of the right parasellar component as possible.  We will resect as much of the intradural component as possible.  The limiting factor in resection of the parasellar component will be the cranial nerves.  The limiting factor for the intradural component will be the arterial involvement, specifically branches of the right ICA and right PCA.  We went over the major risks of the operation including death, stroke, right-sided blindness right-sided ophthalmoplegia, CSF leak, infection, panhypopituitarism and need for reoperation.  The etiology of his fatigue is unclear.  We will recheck cortisol and thyroid profile prior to or on the morning of surgery.  We will keep you informed of his progress following this formidable operation.  Please do not hesitate to contact us with questions.      Sincerely,         JANUSZ CHADWICK MD             D: 2021   T: 2021   MT: cristian      Name:     TAMMY AVERY   MRN:      [id number]        Account:      [id number]   :      1975           Service Date: 2021      Document: [id number]

## 2021-03-23 ENCOUNTER — APPOINTMENT (OUTPATIENT)
Dept: OCCUPATIONAL THERAPY | Facility: CLINIC | Age: 46
End: 2021-03-23
Attending: STUDENT IN AN ORGANIZED HEALTH CARE EDUCATION/TRAINING PROGRAM
Payer: COMMERCIAL

## 2021-03-23 ENCOUNTER — APPOINTMENT (OUTPATIENT)
Dept: MRI IMAGING | Facility: CLINIC | Age: 46
End: 2021-03-23
Attending: NEUROLOGICAL SURGERY
Payer: COMMERCIAL

## 2021-03-23 ENCOUNTER — APPOINTMENT (OUTPATIENT)
Dept: CT IMAGING | Facility: CLINIC | Age: 46
End: 2021-03-23
Attending: STUDENT IN AN ORGANIZED HEALTH CARE EDUCATION/TRAINING PROGRAM
Payer: COMMERCIAL

## 2021-03-23 LAB
ALBUMIN SERPL-MCNC: 3 G/DL (ref 3.4–5)
ALP SERPL-CCNC: 55 U/L (ref 40–150)
ALT SERPL W P-5'-P-CCNC: 19 U/L (ref 0–70)
ANION GAP SERPL CALCULATED.3IONS-SCNC: 4 MMOL/L (ref 3–14)
AST SERPL W P-5'-P-CCNC: 33 U/L (ref 0–45)
BILIRUB DIRECT SERPL-MCNC: <0.1 MG/DL (ref 0–0.2)
BILIRUB SERPL-MCNC: 0.2 MG/DL (ref 0.2–1.3)
BUN SERPL-MCNC: 14 MG/DL (ref 7–30)
CALCIUM SERPL-MCNC: 7.8 MG/DL (ref 8.5–10.1)
CHLORIDE SERPL-SCNC: 112 MMOL/L (ref 94–109)
CO2 SERPL-SCNC: 26 MMOL/L (ref 20–32)
CREAT SERPL-MCNC: 0.88 MG/DL (ref 0.66–1.25)
ERYTHROCYTE [DISTWIDTH] IN BLOOD BY AUTOMATED COUNT: 13.6 % (ref 10–15)
GFR SERPL CREATININE-BSD FRML MDRD: >90 ML/MIN/{1.73_M2}
GLUCOSE BLDC GLUCOMTR-MCNC: 136 MG/DL (ref 70–99)
GLUCOSE SERPL-MCNC: 129 MG/DL (ref 70–99)
HBA1C MFR BLD: 5.5 % (ref 0–5.6)
HCT VFR BLD AUTO: 30.2 % (ref 40–53)
HGB BLD-MCNC: 10 G/DL (ref 13.3–17.7)
MAGNESIUM SERPL-MCNC: 2 MG/DL (ref 1.6–2.3)
MCH RBC QN AUTO: 30.1 PG (ref 26.5–33)
MCHC RBC AUTO-ENTMCNC: 33.1 G/DL (ref 31.5–36.5)
MCV RBC AUTO: 91 FL (ref 78–100)
OSMOLALITY UR: 555 MMOL/KG (ref 100–1200)
PHOSPHATE SERPL-MCNC: 3.7 MG/DL (ref 2.5–4.5)
PLATELET # BLD AUTO: 189 10E9/L (ref 150–450)
POTASSIUM SERPL-SCNC: 4.2 MMOL/L (ref 3.4–5.3)
PROT SERPL-MCNC: 5.8 G/DL (ref 6.8–8.8)
RBC # BLD AUTO: 3.32 10E12/L (ref 4.4–5.9)
SODIUM SERPL-SCNC: 142 MMOL/L (ref 133–144)
SODIUM SERPL-SCNC: 143 MMOL/L (ref 133–144)
SODIUM SERPL-SCNC: 145 MMOL/L (ref 133–144)
SP GR UR STRIP: 1.02 (ref 1–1.03)
WBC # BLD AUTO: 12.5 10E9/L (ref 4–11)

## 2021-03-23 PROCEDURE — 85027 COMPLETE CBC AUTOMATED: CPT | Performed by: STUDENT IN AN ORGANIZED HEALTH CARE EDUCATION/TRAINING PROGRAM

## 2021-03-23 PROCEDURE — 97530 THERAPEUTIC ACTIVITIES: CPT | Mod: GO | Performed by: OCCUPATIONAL THERAPIST

## 2021-03-23 PROCEDURE — 250N000011 HC RX IP 250 OP 636: Performed by: STUDENT IN AN ORGANIZED HEALTH CARE EDUCATION/TRAINING PROGRAM

## 2021-03-23 PROCEDURE — 250N000011 HC RX IP 250 OP 636: Performed by: ANESTHESIOLOGY

## 2021-03-23 PROCEDURE — 255N000002 HC RX 255 OP 636: Performed by: NEUROLOGICAL SURGERY

## 2021-03-23 PROCEDURE — 84100 ASSAY OF PHOSPHORUS: CPT | Performed by: STUDENT IN AN ORGANIZED HEALTH CARE EDUCATION/TRAINING PROGRAM

## 2021-03-23 PROCEDURE — 999N001017 HC STATISTIC GLUCOSE BY METER IP

## 2021-03-23 PROCEDURE — 250N000013 HC RX MED GY IP 250 OP 250 PS 637: Performed by: STUDENT IN AN ORGANIZED HEALTH CARE EDUCATION/TRAINING PROGRAM

## 2021-03-23 PROCEDURE — 258N000003 HC RX IP 258 OP 636: Performed by: NEUROLOGICAL SURGERY

## 2021-03-23 PROCEDURE — 250N000012 HC RX MED GY IP 250 OP 636 PS 637: Performed by: STUDENT IN AN ORGANIZED HEALTH CARE EDUCATION/TRAINING PROGRAM

## 2021-03-23 PROCEDURE — 83036 HEMOGLOBIN GLYCOSYLATED A1C: CPT | Performed by: STUDENT IN AN ORGANIZED HEALTH CARE EDUCATION/TRAINING PROGRAM

## 2021-03-23 PROCEDURE — 99291 CRITICAL CARE FIRST HOUR: CPT | Mod: GC | Performed by: PSYCHIATRY & NEUROLOGY

## 2021-03-23 PROCEDURE — 84295 ASSAY OF SERUM SODIUM: CPT | Performed by: STUDENT IN AN ORGANIZED HEALTH CARE EDUCATION/TRAINING PROGRAM

## 2021-03-23 PROCEDURE — 81003 URINALYSIS AUTO W/O SCOPE: CPT | Performed by: NURSE PRACTITIONER

## 2021-03-23 PROCEDURE — 258N000003 HC RX IP 258 OP 636: Performed by: STUDENT IN AN ORGANIZED HEALTH CARE EDUCATION/TRAINING PROGRAM

## 2021-03-23 PROCEDURE — 70553 MRI BRAIN STEM W/O & W/DYE: CPT

## 2021-03-23 PROCEDURE — 70450 CT HEAD/BRAIN W/O DYE: CPT

## 2021-03-23 PROCEDURE — 70450 CT HEAD/BRAIN W/O DYE: CPT | Mod: 26 | Performed by: RADIOLOGY

## 2021-03-23 PROCEDURE — 83735 ASSAY OF MAGNESIUM: CPT | Performed by: STUDENT IN AN ORGANIZED HEALTH CARE EDUCATION/TRAINING PROGRAM

## 2021-03-23 PROCEDURE — A9585 GADOBUTROL INJECTION: HCPCS | Performed by: NEUROLOGICAL SURGERY

## 2021-03-23 PROCEDURE — 80048 BASIC METABOLIC PNL TOTAL CA: CPT | Performed by: STUDENT IN AN ORGANIZED HEALTH CARE EDUCATION/TRAINING PROGRAM

## 2021-03-23 PROCEDURE — 200N000002 HC R&B ICU UMMC

## 2021-03-23 PROCEDURE — 250N000013 HC RX MED GY IP 250 OP 250 PS 637: Performed by: NURSE PRACTITIONER

## 2021-03-23 PROCEDURE — 83935 ASSAY OF URINE OSMOLALITY: CPT | Performed by: NURSE PRACTITIONER

## 2021-03-23 PROCEDURE — 97535 SELF CARE MNGMENT TRAINING: CPT | Mod: GO | Performed by: OCCUPATIONAL THERAPIST

## 2021-03-23 PROCEDURE — 70553 MRI BRAIN STEM W/O & W/DYE: CPT | Mod: 26 | Performed by: RADIOLOGY

## 2021-03-23 PROCEDURE — 97165 OT EVAL LOW COMPLEX 30 MIN: CPT | Mod: GO | Performed by: OCCUPATIONAL THERAPIST

## 2021-03-23 PROCEDURE — 84295 ASSAY OF SERUM SODIUM: CPT | Performed by: NURSE PRACTITIONER

## 2021-03-23 PROCEDURE — 80076 HEPATIC FUNCTION PANEL: CPT | Performed by: STUDENT IN AN ORGANIZED HEALTH CARE EDUCATION/TRAINING PROGRAM

## 2021-03-23 RX ORDER — DEXAMETHASONE SODIUM PHOSPHATE 4 MG/ML
4 INJECTION, SOLUTION INTRA-ARTICULAR; INTRALESIONAL; INTRAMUSCULAR; INTRAVENOUS; SOFT TISSUE ONCE
Status: COMPLETED | OUTPATIENT
Start: 2021-03-23 | End: 2021-03-23

## 2021-03-23 RX ORDER — NALOXONE HYDROCHLORIDE 0.4 MG/ML
0.4 INJECTION, SOLUTION INTRAMUSCULAR; INTRAVENOUS; SUBCUTANEOUS
Status: DISCONTINUED | OUTPATIENT
Start: 2021-03-23 | End: 2021-03-23 | Stop reason: HOSPADM

## 2021-03-23 RX ORDER — ONDANSETRON 2 MG/ML
4 INJECTION INTRAMUSCULAR; INTRAVENOUS EVERY 6 HOURS PRN
Status: DISCONTINUED | OUTPATIENT
Start: 2021-03-23 | End: 2021-03-26 | Stop reason: HOSPADM

## 2021-03-23 RX ORDER — NALOXONE HYDROCHLORIDE 0.4 MG/ML
0.4 INJECTION, SOLUTION INTRAMUSCULAR; INTRAVENOUS; SUBCUTANEOUS
Status: ACTIVE | OUTPATIENT
Start: 2021-03-23 | End: 2021-03-24

## 2021-03-23 RX ORDER — ONDANSETRON 4 MG/1
4 TABLET, ORALLY DISINTEGRATING ORAL EVERY 6 HOURS PRN
Status: DISCONTINUED | OUTPATIENT
Start: 2021-03-23 | End: 2021-03-26 | Stop reason: HOSPADM

## 2021-03-23 RX ORDER — ALBUTEROL SULFATE 0.83 MG/ML
2.5 SOLUTION RESPIRATORY (INHALATION) EVERY 4 HOURS PRN
Status: DISCONTINUED | OUTPATIENT
Start: 2021-03-23 | End: 2021-03-23 | Stop reason: HOSPADM

## 2021-03-23 RX ORDER — ONDANSETRON 2 MG/ML
4 INJECTION INTRAMUSCULAR; INTRAVENOUS EVERY 30 MIN PRN
Status: DISCONTINUED | OUTPATIENT
Start: 2021-03-23 | End: 2021-03-23 | Stop reason: HOSPADM

## 2021-03-23 RX ORDER — AMOXICILLIN 250 MG
1 CAPSULE ORAL 2 TIMES DAILY
Status: DISCONTINUED | OUTPATIENT
Start: 2021-03-23 | End: 2021-03-23

## 2021-03-23 RX ORDER — HYDRALAZINE HYDROCHLORIDE 20 MG/ML
10-20 INJECTION INTRAMUSCULAR; INTRAVENOUS EVERY 30 MIN PRN
Status: DISCONTINUED | OUTPATIENT
Start: 2021-03-23 | End: 2021-03-24

## 2021-03-23 RX ORDER — PROCHLORPERAZINE MALEATE 10 MG
10 TABLET ORAL EVERY 6 HOURS PRN
Status: DISCONTINUED | OUTPATIENT
Start: 2021-03-23 | End: 2021-03-26 | Stop reason: HOSPADM

## 2021-03-23 RX ORDER — SODIUM CHLORIDE, SODIUM LACTATE, POTASSIUM CHLORIDE, CALCIUM CHLORIDE 600; 310; 30; 20 MG/100ML; MG/100ML; MG/100ML; MG/100ML
INJECTION, SOLUTION INTRAVENOUS CONTINUOUS
Status: DISCONTINUED | OUTPATIENT
Start: 2021-03-23 | End: 2021-03-23 | Stop reason: HOSPADM

## 2021-03-23 RX ORDER — SODIUM CHLORIDE 9 MG/ML
INJECTION, SOLUTION INTRAVENOUS CONTINUOUS
Status: ACTIVE | OUTPATIENT
Start: 2021-03-23 | End: 2021-03-23

## 2021-03-23 RX ORDER — NALOXONE HYDROCHLORIDE 0.4 MG/ML
0.2 INJECTION, SOLUTION INTRAMUSCULAR; INTRAVENOUS; SUBCUTANEOUS
Status: DISCONTINUED | OUTPATIENT
Start: 2021-03-23 | End: 2021-03-23 | Stop reason: HOSPADM

## 2021-03-23 RX ORDER — OXYCODONE HYDROCHLORIDE 5 MG/1
5 TABLET ORAL EVERY 4 HOURS PRN
Status: DISCONTINUED | OUTPATIENT
Start: 2021-03-23 | End: 2021-03-23

## 2021-03-23 RX ORDER — FENTANYL CITRATE 50 UG/ML
25-50 INJECTION, SOLUTION INTRAMUSCULAR; INTRAVENOUS EVERY 5 MIN PRN
Status: DISCONTINUED | OUTPATIENT
Start: 2021-03-23 | End: 2021-03-23 | Stop reason: HOSPADM

## 2021-03-23 RX ORDER — DEXAMETHASONE 4 MG/1
4 TABLET ORAL EVERY 8 HOURS SCHEDULED
Status: DISPENSED | OUTPATIENT
Start: 2021-03-23 | End: 2021-03-24

## 2021-03-23 RX ORDER — DEXAMETHASONE 1.5 MG/1
3 TABLET ORAL EVERY 8 HOURS SCHEDULED
Status: DISPENSED | OUTPATIENT
Start: 2021-03-24 | End: 2021-03-25

## 2021-03-23 RX ORDER — LABETALOL HYDROCHLORIDE 5 MG/ML
10-40 INJECTION, SOLUTION INTRAVENOUS EVERY 10 MIN PRN
Status: DISCONTINUED | OUTPATIENT
Start: 2021-03-23 | End: 2021-03-24

## 2021-03-23 RX ORDER — HYDROXYZINE HYDROCHLORIDE 10 MG/1
10 TABLET, FILM COATED ORAL 3 TIMES DAILY PRN
Status: DISCONTINUED | OUTPATIENT
Start: 2021-03-23 | End: 2021-03-26 | Stop reason: HOSPADM

## 2021-03-23 RX ORDER — NALOXONE HYDROCHLORIDE 0.4 MG/ML
0.2 INJECTION, SOLUTION INTRAMUSCULAR; INTRAVENOUS; SUBCUTANEOUS
Status: ACTIVE | OUTPATIENT
Start: 2021-03-23 | End: 2021-03-24

## 2021-03-23 RX ORDER — POLYETHYLENE GLYCOL 3350 17 G/17G
17 POWDER, FOR SOLUTION ORAL 3 TIMES DAILY
Status: DISCONTINUED | OUTPATIENT
Start: 2021-03-23 | End: 2021-03-26 | Stop reason: HOSPADM

## 2021-03-23 RX ORDER — AMOXICILLIN 250 MG
2 CAPSULE ORAL 2 TIMES DAILY
Status: DISCONTINUED | OUTPATIENT
Start: 2021-03-23 | End: 2021-03-26 | Stop reason: HOSPADM

## 2021-03-23 RX ORDER — HYDROMORPHONE HYDROCHLORIDE 1 MG/ML
0.4 INJECTION, SOLUTION INTRAMUSCULAR; INTRAVENOUS; SUBCUTANEOUS
Status: DISCONTINUED | OUTPATIENT
Start: 2021-03-23 | End: 2021-03-23 | Stop reason: ALTCHOICE

## 2021-03-23 RX ORDER — POLYETHYLENE GLYCOL 3350 17 G/17G
17 POWDER, FOR SOLUTION ORAL DAILY
Status: DISCONTINUED | OUTPATIENT
Start: 2021-03-23 | End: 2021-03-23

## 2021-03-23 RX ORDER — HYDRALAZINE HYDROCHLORIDE 20 MG/ML
2.5-5 INJECTION INTRAMUSCULAR; INTRAVENOUS
Status: DISCONTINUED | OUTPATIENT
Start: 2021-03-23 | End: 2021-03-23 | Stop reason: HOSPADM

## 2021-03-23 RX ORDER — LEVETIRACETAM 750 MG/1
750 TABLET ORAL 2 TIMES DAILY
Status: DISCONTINUED | OUTPATIENT
Start: 2021-03-23 | End: 2021-03-26 | Stop reason: HOSPADM

## 2021-03-23 RX ORDER — LIDOCAINE 40 MG/G
CREAM TOPICAL
Status: DISCONTINUED | OUTPATIENT
Start: 2021-03-23 | End: 2021-03-26 | Stop reason: HOSPADM

## 2021-03-23 RX ORDER — HYDROMORPHONE HCL IN WATER/PF 6 MG/30 ML
0.2 PATIENT CONTROLLED ANALGESIA SYRINGE INTRAVENOUS
Status: DISCONTINUED | OUTPATIENT
Start: 2021-03-23 | End: 2021-03-23 | Stop reason: ALTCHOICE

## 2021-03-23 RX ORDER — GADOBUTROL 604.72 MG/ML
10 INJECTION INTRAVENOUS ONCE
Status: COMPLETED | OUTPATIENT
Start: 2021-03-23 | End: 2021-03-23

## 2021-03-23 RX ORDER — DEXAMETHASONE 1 MG
1 TABLET ORAL EVERY 8 HOURS SCHEDULED
Status: DISCONTINUED | OUTPATIENT
Start: 2021-03-26 | End: 2021-03-26 | Stop reason: HOSPADM

## 2021-03-23 RX ORDER — DEXAMETHASONE SODIUM PHOSPHATE 4 MG/ML
3 INJECTION, SOLUTION INTRA-ARTICULAR; INTRALESIONAL; INTRAMUSCULAR; INTRAVENOUS; SOFT TISSUE ONCE
Status: COMPLETED | OUTPATIENT
Start: 2021-03-24 | End: 2021-03-24

## 2021-03-23 RX ORDER — OXYCODONE HYDROCHLORIDE 10 MG/1
10 TABLET ORAL EVERY 6 HOURS
Status: DISPENSED | OUTPATIENT
Start: 2021-03-23 | End: 2021-03-24

## 2021-03-23 RX ORDER — OXYCODONE HYDROCHLORIDE 10 MG/1
10 TABLET ORAL EVERY 4 HOURS PRN
Status: DISCONTINUED | OUTPATIENT
Start: 2021-03-23 | End: 2021-03-23

## 2021-03-23 RX ORDER — FENTANYL CITRATE 50 UG/ML
25-50 INJECTION, SOLUTION INTRAMUSCULAR; INTRAVENOUS
Status: DISCONTINUED | OUTPATIENT
Start: 2021-03-23 | End: 2021-03-23 | Stop reason: HOSPADM

## 2021-03-23 RX ORDER — ACETAMINOPHEN 325 MG/1
975 TABLET ORAL EVERY 8 HOURS
Status: COMPLETED | OUTPATIENT
Start: 2021-03-23 | End: 2021-03-25

## 2021-03-23 RX ORDER — ACETAMINOPHEN 325 MG/1
650 TABLET ORAL EVERY 4 HOURS PRN
Status: DISCONTINUED | OUTPATIENT
Start: 2021-03-25 | End: 2021-03-26 | Stop reason: HOSPADM

## 2021-03-23 RX ORDER — HYDROMORPHONE HYDROCHLORIDE 1 MG/ML
.3-.5 INJECTION, SOLUTION INTRAMUSCULAR; INTRAVENOUS; SUBCUTANEOUS
Status: DISCONTINUED | OUTPATIENT
Start: 2021-03-23 | End: 2021-03-26 | Stop reason: HOSPADM

## 2021-03-23 RX ORDER — MEPERIDINE HYDROCHLORIDE 25 MG/ML
12.5 INJECTION INTRAMUSCULAR; INTRAVENOUS; SUBCUTANEOUS
Status: DISCONTINUED | OUTPATIENT
Start: 2021-03-23 | End: 2021-03-23 | Stop reason: HOSPADM

## 2021-03-23 RX ORDER — DEXAMETHASONE 2 MG/1
2 TABLET ORAL EVERY 8 HOURS SCHEDULED
Status: COMPLETED | OUTPATIENT
Start: 2021-03-25 | End: 2021-03-25

## 2021-03-23 RX ORDER — OXYCODONE HYDROCHLORIDE 10 MG/1
10 TABLET ORAL EVERY 6 HOURS
Status: DISCONTINUED | OUTPATIENT
Start: 2021-03-23 | End: 2021-03-23

## 2021-03-23 RX ORDER — ONDANSETRON 4 MG/1
4 TABLET, ORALLY DISINTEGRATING ORAL EVERY 30 MIN PRN
Status: DISCONTINUED | OUTPATIENT
Start: 2021-03-23 | End: 2021-03-23 | Stop reason: HOSPADM

## 2021-03-23 RX ADMIN — HYDRALAZINE HYDROCHLORIDE 20 MG: 20 INJECTION INTRAMUSCULAR; INTRAVENOUS at 05:12

## 2021-03-23 RX ADMIN — LABETALOL HYDROCHLORIDE 20 MG: 5 INJECTION, SOLUTION INTRAVENOUS at 13:24

## 2021-03-23 RX ADMIN — OXYCODONE HYDROCHLORIDE 10 MG: 10 TABLET ORAL at 06:39

## 2021-03-23 RX ADMIN — LEVETIRACETAM 750 MG: 750 TABLET, FILM COATED ORAL at 20:04

## 2021-03-23 RX ADMIN — OXYCODONE HYDROCHLORIDE 10 MG: 10 TABLET ORAL at 19:11

## 2021-03-23 RX ADMIN — CALCIUM GLUCONATE 3 G: 98 INJECTION, SOLUTION INTRAVENOUS at 13:40

## 2021-03-23 RX ADMIN — GADOBUTROL 10 ML: 604.72 INJECTION INTRAVENOUS at 22:34

## 2021-03-23 RX ADMIN — FENTANYL CITRATE 50 MCG: 50 INJECTION, SOLUTION INTRAMUSCULAR; INTRAVENOUS at 00:39

## 2021-03-23 RX ADMIN — FENTANYL CITRATE 25 MCG: 50 INJECTION, SOLUTION INTRAMUSCULAR; INTRAVENOUS at 00:12

## 2021-03-23 RX ADMIN — HYDROMORPHONE HYDROCHLORIDE 0.5 MG: 1 INJECTION, SOLUTION INTRAMUSCULAR; INTRAVENOUS; SUBCUTANEOUS at 09:35

## 2021-03-23 RX ADMIN — PROCHLORPERAZINE EDISYLATE 10 MG: 5 INJECTION INTRAMUSCULAR; INTRAVENOUS at 16:37

## 2021-03-23 RX ADMIN — ACETAMINOPHEN 975 MG: 325 TABLET, FILM COATED ORAL at 02:27

## 2021-03-23 RX ADMIN — POLYETHYLENE GLYCOL 3350 17 G: 17 POWDER, FOR SOLUTION ORAL at 20:03

## 2021-03-23 RX ADMIN — SODIUM CHLORIDE 100 ML: 9 INJECTION, SOLUTION INTRAVENOUS at 22:34

## 2021-03-23 RX ADMIN — ONDANSETRON 4 MG: 2 INJECTION INTRAMUSCULAR; INTRAVENOUS at 21:57

## 2021-03-23 RX ADMIN — ACETAMINOPHEN 975 MG: 325 TABLET, FILM COATED ORAL at 10:16

## 2021-03-23 RX ADMIN — HYDROMORPHONE HYDROCHLORIDE 0.25 MG: 1 INJECTION, SOLUTION INTRAMUSCULAR; INTRAVENOUS; SUBCUTANEOUS at 00:41

## 2021-03-23 RX ADMIN — FENTANYL CITRATE 25 MCG: 50 INJECTION, SOLUTION INTRAMUSCULAR; INTRAVENOUS at 00:20

## 2021-03-23 RX ADMIN — HYDROMORPHONE HYDROCHLORIDE 0.5 MG: 1 INJECTION, SOLUTION INTRAMUSCULAR; INTRAVENOUS; SUBCUTANEOUS at 22:51

## 2021-03-23 RX ADMIN — OXYCODONE HYDROCHLORIDE 10 MG: 10 TABLET ORAL at 02:57

## 2021-03-23 RX ADMIN — DEXAMETHASONE SODIUM PHOSPHATE 4 MG: 4 INJECTION, SOLUTION INTRAMUSCULAR; INTRAVENOUS at 22:51

## 2021-03-23 RX ADMIN — HYDROMORPHONE HYDROCHLORIDE 0.5 MG: 1 INJECTION, SOLUTION INTRAMUSCULAR; INTRAVENOUS; SUBCUTANEOUS at 12:13

## 2021-03-23 RX ADMIN — POLYETHYLENE GLYCOL 3350 17 G: 17 POWDER, FOR SOLUTION ORAL at 15:19

## 2021-03-23 RX ADMIN — DEXAMETHASONE 4 MG: 4 TABLET ORAL at 15:19

## 2021-03-23 RX ADMIN — HYDRALAZINE HYDROCHLORIDE 20 MG: 20 INJECTION INTRAMUSCULAR; INTRAVENOUS at 05:43

## 2021-03-23 RX ADMIN — HYDROMORPHONE HYDROCHLORIDE 0.2 MG: 1 INJECTION, SOLUTION INTRAMUSCULAR; INTRAVENOUS; SUBCUTANEOUS at 01:09

## 2021-03-23 RX ADMIN — HYDROMORPHONE HYDROCHLORIDE 0.5 MG: 1 INJECTION, SOLUTION INTRAMUSCULAR; INTRAVENOUS; SUBCUTANEOUS at 04:04

## 2021-03-23 RX ADMIN — DOCUSATE SODIUM 50 MG AND SENNOSIDES 8.6 MG 1 TABLET: 8.6; 5 TABLET, FILM COATED ORAL at 10:14

## 2021-03-23 RX ADMIN — HYDROMORPHONE HYDROCHLORIDE 0.5 MG: 1 INJECTION, SOLUTION INTRAMUSCULAR; INTRAVENOUS; SUBCUTANEOUS at 15:18

## 2021-03-23 RX ADMIN — LABETALOL HYDROCHLORIDE 10 MG: 5 INJECTION, SOLUTION INTRAVENOUS at 08:14

## 2021-03-23 RX ADMIN — SODIUM CHLORIDE: 9 INJECTION, SOLUTION INTRAVENOUS at 10:42

## 2021-03-23 RX ADMIN — ACETAMINOPHEN 975 MG: 325 TABLET, FILM COATED ORAL at 19:11

## 2021-03-23 RX ADMIN — HYDROMORPHONE HYDROCHLORIDE 0.25 MG: 1 INJECTION, SOLUTION INTRAMUSCULAR; INTRAVENOUS; SUBCUTANEOUS at 00:32

## 2021-03-23 RX ADMIN — HYDROMORPHONE HYDROCHLORIDE 0.3 MG: 1 INJECTION, SOLUTION INTRAMUSCULAR; INTRAVENOUS; SUBCUTANEOUS at 00:59

## 2021-03-23 RX ADMIN — HYDRALAZINE HYDROCHLORIDE 20 MG: 20 INJECTION INTRAMUSCULAR; INTRAVENOUS at 16:23

## 2021-03-23 RX ADMIN — SODIUM CHLORIDE: 9 INJECTION, SOLUTION INTRAVENOUS at 00:13

## 2021-03-23 RX ADMIN — ONDANSETRON 4 MG: 2 INJECTION INTRAMUSCULAR; INTRAVENOUS at 13:23

## 2021-03-23 RX ADMIN — HYDROMORPHONE HYDROCHLORIDE 0.4 MG: 1 INJECTION, SOLUTION INTRAMUSCULAR; INTRAVENOUS; SUBCUTANEOUS at 02:30

## 2021-03-23 RX ADMIN — LEVETIRACETAM 750 MG: 750 TABLET, FILM COATED ORAL at 10:14

## 2021-03-23 RX ADMIN — DOCUSATE SODIUM 50 MG AND SENNOSIDES 8.6 MG 2 TABLET: 8.6; 5 TABLET, FILM COATED ORAL at 20:03

## 2021-03-23 RX ADMIN — DEXAMETHASONE 4 MG: 4 TABLET ORAL at 05:12

## 2021-03-23 RX ADMIN — OMEPRAZOLE 20 MG: 20 CAPSULE, DELAYED RELEASE ORAL at 10:16

## 2021-03-23 RX ADMIN — POLYETHYLENE GLYCOL 3350 17 G: 17 POWDER, FOR SOLUTION ORAL at 10:14

## 2021-03-23 RX ADMIN — HYDRALAZINE HYDROCHLORIDE 20 MG: 20 INJECTION INTRAMUSCULAR; INTRAVENOUS at 16:53

## 2021-03-23 RX ADMIN — ONDANSETRON 4 MG: 2 INJECTION INTRAMUSCULAR; INTRAVENOUS at 06:46

## 2021-03-23 RX ADMIN — HYDROMORPHONE HYDROCHLORIDE 0.5 MG: 1 INJECTION, SOLUTION INTRAMUSCULAR; INTRAVENOUS; SUBCUTANEOUS at 03:56

## 2021-03-23 RX ADMIN — HYDROXYZINE HYDROCHLORIDE 10 MG: 10 TABLET, FILM COATED ORAL at 15:42

## 2021-03-23 RX ADMIN — HYDROXYZINE HYDROCHLORIDE 10 MG: 10 TABLET, FILM COATED ORAL at 03:53

## 2021-03-23 RX ADMIN — LABETALOL HYDROCHLORIDE 20 MG: 5 INJECTION, SOLUTION INTRAVENOUS at 17:52

## 2021-03-23 ASSESSMENT — VISUAL ACUITY
OU: OTHER (SEE COMMENT)

## 2021-03-23 ASSESSMENT — ACTIVITIES OF DAILY LIVING (ADL)
ADLS_ACUITY_SCORE: 17
ADLS_ACUITY_SCORE: 17
ADLS_ACUITY_SCORE: 16
PREVIOUS_RESPONSIBILITIES: MEAL PREP;HOUSEKEEPING;LAUNDRY;SHOPPING;MEDICATION MANAGEMENT;FINANCES;DRIVING;WORK
ADLS_ACUITY_SCORE: 19
ADLS_ACUITY_SCORE: 19

## 2021-03-23 ASSESSMENT — MIFFLIN-ST. JEOR: SCORE: 1899.38

## 2021-03-23 NOTE — PROCEDURES
Neurosurgery Brief Progress Note    Lumbar drain removal    Drain not deemed to be necessary anymore. Drain site and drain retaining stitches were prepped in usual sterile fashion with chloraprep. The drain was clamped. The sutures securing the drain were cut and the site was re-prepped. The drain was removed. The lumbar drain catheter tip was evaluated for completeness. A single figure of 8 stitch with 4-0 monocryl was placed with adequate hemostasis and the site was re-prepped with chloraprep and a dressing was applied. No immediate complication was observed and the patient tolerated the procedure well and was placed on two hour bed rest.    Brady Walters MD, PhD   Neurosurgery Resident, PGY-2    Please contact neurosurgery resident on call with questions.    Dial * * *[phone], enter [phone] when prompted.

## 2021-03-23 NOTE — ANESTHESIA CARE TRANSFER NOTE
Patient: Vincenzo Avery    Procedure(s):  stealth assisted Right frontotemporal craniotomy and resection of tumor  INSERTION, DRAIN, SPINE, LUMBAR    Diagnosis: Pituitary macroadenoma (H) [D35.2]  Diagnosis Additional Information: No value filed.    Anesthesia Type:   General     Note:    Oropharynx: oral airway in place  Level of Consciousness: drowsy  Oxygen Supplementation: face mask  Level of Supplemental Oxygen (L/min / FiO2): 8  Independent Airway: airway patency satisfactory and stable  Dentition: dentition unchanged  Vital Signs Stable: post-procedure vital signs reviewed and stable  Report to RN Given: handoff report given  Patient transferred to: PACU    Handoff Report: Identifed the Patient, Identified the Reponsible Provider, Reviewed the pertinent medical history, Discussed the surgical course, Reviewed Intra-OP anesthesia mangement and issues during anesthesia, Set expectations for post-procedure period and Allowed opportunity for questions and acknowledgement of understanding      Vitals: (Last set prior to Anesthesia Care Transfer)  CRNA VITALS  3/22/2021 2318 - 3/22/2021 2355      3/22/2021             NIBP:  122/70    Ht Rate:  100        Electronically Signed By: Zayra Manning MD  March 22, 2021  11:55 PM

## 2021-03-23 NOTE — OR NURSING
Text page sent to Dr. Pietro uHrley, with anesthesia, notifying him patient has met criteria and that neurosurgery had been by the bedside to assess patient. Dr. Hurley will enter post op sign out when time allows.

## 2021-03-23 NOTE — PROGRESS NOTES
"   03/23/21 1000   Quick Adds   Type of Visit Initial Occupational Therapy Evaluation   Living Environment   People in home other relative(s)  (brother)   Current Living Arrangements apartment   Home Accessibility stairs to enter home   Number of Stairs, Main Entrance   (2 flights)   Transportation Anticipated car, drives self;family or friend will provide   Self-Care   Usual Activity Tolerance good   Current Activity Tolerance poor   Equipment Currently Used at Home none   Disability/Function   Hearing Difficulty or Deaf no   Hearing Management n   Wear Glasses or Blind no   Concentrating, Remembering or Making Decisions Difficulty no   Difficulty Communicating no   Difficulty Eating/Swallowing no   Walking or Climbing Stairs Difficulty no   Dressing/Bathing Difficulty no   Toileting issues no   Doing Errands Independently Difficulty (such as shopping) no   Fall history within last six months no   Change in Functional Status Since Onset of Current Illness/Injury yes   General Information   Referring Physician Fiona Cortez   Patient/Family Therapy Goal Statement (OT) \"help me\".    Additional Occupational Profile Info/Pertinent History of Current Problem stealth assisted Right frontotemporal craniotomy and resection of tumor   Existing Precautions/Restrictions other (see comments)  (craniotomy. )   General Observations and Info pt with R eye swollen shut, pain is a limiting factor.    Cognitive Status Examination   Orientation Status person   Affect/Mental Status (Cognitive) confused   Follows Commands follows one-step commands;50-74% accuracy   Cognitive Status Comments further testing required.    Visual Perception   Visual Impairment/Limitations blurry vision   Impact of Vision Impairment on Function (Vision) pt able to identify 2 fingers presented at approx 2 feet, unable to read clock on wall from approx 20 feet.    Sensory   Sensory Quick Adds No deficits were identified   Range of Motion Comprehensive "   General Range of Motion no range of motion deficits identified   Strength Comprehensive (MMT)   General Manual Muscle Testing (MMT) Assessment other (see comments)   Comment, General Manual Muscle Testing (MMT) Assessment grossly deconditioned, pain confounding results of MMT.    Coordination   Coordination Comments pt sluggish today, pain limiting coordination results. Pt alb eot step to chair min assist.    Bed Mobility   Bed Mobility supine-sit   Supine-Sit [place] (Bed Mobility) maximum assist (25% patient effort)   Transfers   Transfers bed-chair transfer;sit-stand transfer   Transfer Skill: Bed to Chair/Chair to Bed   Bed-Chair [place] (Transfers) minimum assist (75% patient effort)   Sit-Stand Transfer   Sit-Stand [place] (Transfers) minimum assist (75% patient effort)   Balance   Balance Assessment standing balance: dynamic   Standing Balance: Dynamic fair balance   Activities of Daily Living   BADL Assessment/Intervention lower body dressing   Lower Body Dressing Assessment/Training   [place] Level (Lower Body Dressing)   (education required. )   Instrumental Activities of Daily Living (IADL)   Previous Responsibilities meal prep;housekeeping;laundry;shopping;medication management;finances;driving;work   IADL Comments brother lives with pt, unclear to level of assist he can provide.    Clinical Impression   Criteria for Skilled Therapeutic Interventions Met (OT) yes   OT Diagnosis decreased ADL I   Assessment of Occupational Performance 5 or more Performance Deficits   Identified Performance Deficits dressing, bathing, toileting, G/H, home making, work, driving.    Planned Therapy Interventions (OT) ADL retraining;IADL retraining;bed mobility training;cognition;fine motor coordination training;strengthening;transfer training;home program guidelines;progressive activity/exercise;risk factor education   Clinical Decision Making Complexity (OT) low complexity   Therapy Frequency (OT)  5x/week   Predicted Duration of Therapy 2 weeks   Risk & Benefits of therapy have been explained evaluation/treatment results reviewed;care plan/treatment goals reviewed;risks/benefits reviewed;current/potential barriers reviewed;participants voiced agreement with care plan;participants included;patient   Comment-Clinical Impression Pt presents to OT with post surgical precautions and pain limiting ADL I. Pt to benefit form skilled OT intervnetion to address the above problem list.   OT Discharge Planning    OT Discharge Recommendation (DC Rec) Home with assist;Transitional Care Facility   OT Rationale for DC Rec anticipate pt will progress to home with assist.    OT Brief overview of current status  Currently pt max assist bed mobility and min assist pivot to chair. Pt with significant pain limiting abilities   Total Evaluation Time (Minutes)   Total Evaluation Time (Minutes) 5

## 2021-03-23 NOTE — PLAN OF CARE
Major Shift Events:  Q1h neuro exam improving. Pt able to follow commands, answer he is in a hospital and his name. Pt is very restless and agitated at times. Not very redirectable. Complaining of head and behind eye pain. PRN Oxycodone and Dilaudid given. Sitter at bedside due to pulling at lines and trying to get out of bed. Heart rate 50-90's. Hydralazine 20mg given x2 for systolic over 140. Does not respond. 4LPM Oxy Plus. Smart having adequate UOP. STAT CT completed.    Plan: Continue with current POC.    For vital signs and complete assessments, please see documentation flowsheets.

## 2021-03-23 NOTE — PROVIDER NOTIFICATION
"Text page sent to Dr. Tonny Perry, with neurosurgery stating:    \"Pt Bennie still not following commands, but mumbling a few more words. Would you like to come assess him again prior to going to 4A? Thanks, Safia PACU RN [phone]\"    @ 0139 - Dr. Tonny Perry at bedside to assess patient. Pt stating more coherent words, such as \"oh god. Gay. Gay help me bathroom.\" Pt reassured he is in a safe place and just had surgery. When asked his last name, he was able to state his last name. Pt still not following commands but is less restless. Dr. Perry feels patient is trending in the right direction and is OK with patient transferring to .  "

## 2021-03-23 NOTE — PROGRESS NOTES
Admitted/transferred from: PACU  Reason for admission/transfer: For further care and management  2 RN skin assessment: completed by Meenu ORTEGA RN and Ceci TONG RN  Result of skin assessment and interventions/actions: Head incision  Height, weight, drug calc weight: Done  Patient belongings (see Flowsheet)  MDRO education added to care plan: Yes

## 2021-03-23 NOTE — ANESTHESIA POSTPROCEDURE EVALUATION
Patient: Vincenzo Avery    Procedure(s):  stealth assisted Right frontotemporal craniotomy and resection of tumor  INSERTION, DRAIN, SPINE, LUMBAR    Diagnosis:Pituitary macroadenoma (H) [D35.2]  Diagnosis Additional Information: No value filed.    Anesthesia Type:  General    Note:  Disposition: Admission   Postop Pain Control: Uneventful            Sign Out: Well controlled pain   PONV: No   Neuro/Psych: Uneventful            Sign Out: Acceptable/Baseline neuro status (Baseline facial nerve deficit)   Airway/Respiratory: Uneventful            Sign Out: Acceptable/Baseline resp. status   CV/Hemodynamics: Uneventful            Sign Out: Acceptable CV status   Other NRE: NONE   DID A NON-ROUTINE EVENT OCCUR? No         Last vitals:  Vitals:    03/23/21 0300 03/23/21 0400 03/23/21 0500   BP:   (!) 138/93   Pulse: 70 97 61   Resp: 12 21 15   Temp:  36.4  C (97.5  F)    SpO2: 99% 97% 98%       Last vitals prior to Anesthesia Care Transfer:  CRNA VITALS  3/22/2021 2318 - 3/23/2021 0018      3/22/2021             NIBP:  122/70    Ht Rate:  100          Electronically Signed By: Pietro Hurley MD  March 23, 2021  6:05 AM

## 2021-03-23 NOTE — PLAN OF CARE
Major Shift Events:  Neuros Q1, equal strength, slightly weaker on L side. R pupil fixed, dilated, L pupil equal, round, reactive. Limited participation in exam. Alert to self, occasionally knows in hospital, unsure where. Pt inconsistently follows commands. SR. SBP goal <140, requiring multiple doses of labetalol and hydralazine prn to maintain. Weaned to RA. -450/hr. Concern for DI, checking serial sodiums. Nauseous with emesis x1. Zofran and compazine given. Unable to go to MRI due to emesis at time of transport, pushed back to later this evening. Up to chair x1 this am.     Plan: Continue to monitor neuro status and urine output. BP goal <140. MRI when able.     For vital signs and complete assessments, please see documentation flowsheets.

## 2021-03-23 NOTE — CONSULTS
[facility]  Neurocritical Care Consult     Admission Date: 3/22/2021  Date of Service: 03/23/2021  Hospital Day: 2     History of Present Illness   Vincenzo Avery is a 46 year old male with PMH of pituitary macroadenoma who was admitted on 3/22/2021 for stealth assisted right frontotemporal craniotomy and resection of residual tumor. He tolerated the procedure appropriately without acute intraoperative complications and was extubated prior to returning to .    This morning, he is in a significant amount of pain. He denies focal weakness, numbness, or tingling. He is unable to provide additional history, therefore history was obtained through chart review.    He initially presented in 2018 with 2-years of L peripheral field cut and worsening right peripheral field cut. He also developed worsening headaches, tinnitus, imbalance, and fatigue. Brain MRI demonstrated a sellar/parasellar predominantly solid mass with central cystic component measuring 4.5x3.9x5.0cm. On 6/29/2018, he underwent a stealth assisted endoscopic transnasal resection of a pituitary tumor.    Past Medical History:   Diagnosis Date     History of kidney stones      Migraines      NIC (obstructive sleep apnea)     Does not use CPAP     Pituitary mass (H)        Past Surgical History:   Procedure Laterality Date     DENTAL SURGERY      [place] teeth extraction     HAND SURGERY Right     Repair of fracture     OPTICAL TRACKING SYSTEM ENDOSCOPIC RESECTION TUMOR CRANIAL N/A 6/29/2018    Procedure: OPTICAL TRACKING SYSTEM ENDOSCOPIC RESECTION TUMOR CRANIAL;  Stealth Assisted Endoscopic Transnasal Resection Of Pituitary Tumor;  Surgeon: Jo-Ann Green MD;  Location: [facility] OR     ORCHIECTOMY SCROTAL Right      Medications:   I have reviewed this patient's current medications  No medications prior to admission.     Allergies:   All allergies reviewed and addressed  Allergies   Allergen Reactions     Morphine       Patient has significant nausea/vomiting with IV morphine.       Family History:   I have reviewed this patient's family history  Family History   Problem Relation Age of Onset     Cerebrovascular Disease Mother      Diabetes Mother      Hypertension Mother      Back Pain Father         Degenerative joint/disc disease     Other - See Comments Sister         Injuries sustained in an MVC     Hypertension Brother      Obesity Brother      Cerebrovascular Disease Maternal Grandmother      Breast Cancer Maternal Grandmother      Hypertension Maternal Grandfather      Medical History Unknown Paternal Grandmother      No Known Problems Sister      Medical History Unknown Paternal Grandfather        Social History:   I have reviewed this patient's social history  Social History     Tobacco Use     Smoking status: Never Smoker     Smokeless tobacco: Never Used   Substance Use Topics     Alcohol use: Yes     Comment: Rarely     ROS: The 10 point Review of Systems is negative other than noted in the HPI or here.      Objective   Temp:  [96.8  F (36  C)-99.9  F (37.7  C)] 98.5  F (36.9  C)  Pulse:  [] 96  Resp:  [11-21] 11  BP: ()/(68-93) 132/77  MAP:  [87 mmHg-112 mmHg] 112 mmHg  Arterial Line BP: (118-160)/(59-88) 142/88  SpO2:  [95 %-99 %] 96 %    Vent Settings:  Resp: 11    I&Os:  I/O last 3 completed shifts:  In: 6578.33 [I.V.:6578.33]  Out: 4960 [Urine:4460; Blood:500]    Physical Exam    General: Laying in hospital bed, no acute distress. Appears to be in pain.  HEENT: Swelling and ecchymoses over the R eye.  CV: Bandage over the right forehead. Extremities appear well perfused.  Resp: On oxymask. No accessory muscle use.  GI: Soft, non-distended.  Extremities: Pedal pulses palpable.  Skin: Warm, dry, no jaundice.    Neuro:  MSE: Eyes are closed, open to voice and noxious stimuli. He responds appropriately to questions. Oriented to self; he is able to identify that he is in the ICU but does not know which  hospital; he does not recall the current month/year. He is able to follow simple commands.  CN: L pupil is 3-4mm, round, reactive. Unable to evaluate R pupil due to edema. Facial movements are symmetric. Hearing appears to be intact to conversation.  Motor: Moving all extremities spontaneously. His strength is limited by significant head pain, but they are at least 4/5 and symmetric. There is no focality. No tremors.  Sensory: Intact to light touch in all extremities.  Station/gait: Deferred.     Labs and Imaging     BMP  Recent Labs   Lab 03/23/21 0302 03/22/21 1752 03/22/21  1213 03/22/21  0601    141 136  --    POTASSIUM 4.2 4.5 3.7 4.0   CHLORIDE 112*  --   --   --    CO2 26  --   --   --    BUN 14  --   --   --    CR 0.88  --   --  1.03   KANA 7.8*  --   --   --      CBC  Recent Labs   Lab 03/23/21 0302 03/22/21 1752 03/22/21 1213 03/22/21  0601   WBC 12.5*  --   --   --    HGB 10.0* 11.2* 11.3* 13.2*     --   --   --      IMAGING:   Reviewed personally and during team rounds. The radiologists interpretation is documented below.    Head CT without contrast 3/23/2021:  Impression:  1. Postsurgical changes of right frontotemporal craniotomy and right  parasellar tumor biopsy or partial resection.  2. Residual hyperdense mass.  3. Perioperative pneumocephalus.  4. Extra-axial blood product accumulations along the anterior midbrain  and harsha, and posterior to the left cerebellum.  5. Subcutaneous emphysema adjacent to the craniotomy which extends  into the  space and about the orbits.  6. Scalp hematoma and edema adjacent to the craniotomy.     Assessment & Recommendations   46yoM with h/o pituitary macroadenoma s/p endoscopic transnasal resection 6/2018 who presented for stealth assisted right frontotemporal craniotomy and resection of residual pituitary macroadenoma.    Neuro  #Pituitary macroadenoma s/p endoscopic transnasal resection in 6/2018  #Residual pituitary macroadenoma s/p  stealth assisted right frontotemporal craniotomy and resection on 3/22, POD#1  -Neurochecks Q1hr  -SBP Goal:  <140  -Steroid taper per neurosurgery: dexamethasone 4mg q8hr x3 doses, 3mg q8hr x3 doses, 2mg q8hr x3 doses, 1mg q8hr x3 doses  -Omeprazole 20mg twice daily while on high dose steroids  -Levetiracetam 750mg twice daily    Pain/sedation management:  -Scheduled acetaminophen 975mg every 8 hours  -PRN acetaminophen 650mg   -Scheduled oxycodone 10mg every 6 hours  -PRN hydromorphone  -PRN hydroxyzine 10mg    Resp  #Respiratory insufficiency, post-operative  #Intubated and sedated for surgical procedure, extubated 3/22  -Wean supplemental oxygen as able  -O2 goal >90%     CV  #Hypertension  Post-operatively likely associated with pain, no history of hypertension. Not on antihypertensive medications PTA.  -SBP goal <140  -PRN labetalol and hydralazine to meet SBP goal     Renal  #Polyuria  Possible central diabetes associated with his pituitary adenoma. UOP since midnight 3.1L.  -Serum sodium  -Urine osm  -Urine spec gravity  -Smart in place  -Strict I&Os     Endo  No acute issues.  -Check hgb A1c  -POC glucose     Heme  #Normocytic anemia, acute blood loss  Per chart review, estimated blood loss in the OR on 3/22 was 500mL.  Hgb 10 this morning compared to 13 on admission.  -Daily CBC  -Hgb goal >7  -Platelet goal >100k  -Transfuse to meet hgb and platelet goals     GI  No known issues.    -Check LFTs     Last BM: prior to admission  Bowel regimen: scheduled Miralax and senna-doc     ID  #Leukocytosis  Likely combination of procedural stress response and demargination in the setting of high dose steroids. WBC 12.5. Afebrile, no other ssx of infection.  -CBC daily  -Trend fever curve    Antibiotics:  None currently     FEN: Advance diet as tolerated, maintenance normal saline at 100mL/hr, monitor lytes and replace as needed  PPX: DVT - SCDs, hold chemoppx in the setting of recent neurosurgical procedure; GI -  omeprazole while in high dose steroids.  Lines, tubes, drains: PIVs, A-line, jaeger, lumbar drain (removed today)  Code: Prior, full  Dispo: ICU pending clinical stability.    Thank you for allowing the Neurocritical Care service to participate in the care of this patient. Please feel free to contact us with questions or concerns.    Patient was seen and discussed with the NCC attending, Dr. Pierson.      Francesca Walker MD  Neurology PGY-2  03/23/2021 9:15 AM

## 2021-03-23 NOTE — BRIEF OP NOTE
"[facility]    Brief Operative Note    Pre-operative diagnosis: Pituitary macroadenoma (H) [D35.2]  Post-operative diagnosis Same as pre-operative diagnosis    Procedure: Procedure(s):  stealth assisted Right frontotemporal craniotomy and resection of tumor  INSERTION, DRAIN, SPINE, LUMBAR  Surgeon: Surgeon(s) and Role:     * Rolf Neely MD - Primary     * Esequiel Peguero MD - Assisting     * Mando Panchal MD - Resident - Assisting     * Leschke, John M, MD - Resident - Assisting     * Tonny Perry MD - Resident - Assisting     * Brady Walters MD - Resident - Assisting     * Fiona Moore MD - Resident - Assisting  Anesthesia: General   Estimated blood loss: 500cc  Drains: Lumbar drain  Specimens:   ID Type Source Tests Collected by Time Destination   A : Cavernous Sinus Tumor Tissue Brain SURGICAL PATHOLOGY EXAM Esequiel Peguero MD 3/22/2021  3:27 PM    B : Pituitary tumor verses nerve Tissue Brain SURGICAL PATHOLOGY EXAM Rolf Neely MD 3/22/2021  4:32 PM    C : CUSA contents  Tissue Brain SURGICAL PATHOLOGY EXAM Rolf Neely MD 3/22/2021  9:32 PM      Findings:   procedure as stated; extradural and intradural tumor removal.  Complications: CN3 injury   .  Implants:   Implant Name Type Inv. Item Serial No.  Lot No. LRB No. Used Action   GRAFT DUREPAIR DURA MATRIX 3X3\" [id number] Bone/Tissue/Biologic GRAFT DUREPAIR DURA MATRIX 3X3\" [id number]  [employer]-NEURO [id number] Right 1 Implanted   IMP PLATE SYN STR DOG BONE LOW PROFILE 2H .502 Metallic Hardware/[place] IMP PLATE SYN STR DOG BONE LOW PROFILE 2H .502 421.502 [employer]  Right 4 Implanted   IMP PLATE SYN STR DOG BONE LOW PROFILE 4H .504 Metallic Hardware/[place] IMP PLATE SYN STR DOG BONE LOW PROFILE 4H .504 421.504 [employer]  Right 1 Implanted   IMP BUR HOLE COVER 17MM LOW PROFILE TI " 421.527 Metallic Hardware/[place] IMP BUR HOLE COVER 17MM LOW PROFILE .527 421.527 [facility]  Right 1 Implanted   IMP SCR SYN MATRIX LOW PRO 1.5X04MM SELF DRILL [id number] Metallic Hardware/[place] IMP SCR SYN MATRIX LOW PRO 1.5X04MM SELF DRILL [id number] [id number] [facility]  Right 1 Implanted   IMP PLATE SYN MATRIX MESH ARC SHAPE LG 0.6MM 04.503.124 Metallic Hardware/[place] IMP PLATE SYN MATRIX MESH ARC SHAPE LG 0.6MM [phone] [facility]  Right 1 Implanted   IMP SCR SYN MATRIX LOW PRO 1.5X04MM SELF DRILL [phone] Metallic Hardware/[place] IMP SCR SYN MATRIX LOW PRO 1.5X04MM SELF DRILL [id number] [id number] [facility]  Right 16 Implanted   IMP PLATE SYN STR DOG BONE LOW PROFILE 2H .502 Metallic Hardware/[place] IMP PLATE SYN STR DOG BONE LOW PROFILE 2H .502 421.502 [facility]  Right 1 Wasted

## 2021-03-24 ENCOUNTER — APPOINTMENT (OUTPATIENT)
Dept: OCCUPATIONAL THERAPY | Facility: CLINIC | Age: 46
End: 2021-03-24
Attending: NEUROLOGICAL SURGERY
Payer: COMMERCIAL

## 2021-03-24 ENCOUNTER — APPOINTMENT (OUTPATIENT)
Dept: GENERAL RADIOLOGY | Facility: CLINIC | Age: 46
End: 2021-03-24
Attending: STUDENT IN AN ORGANIZED HEALTH CARE EDUCATION/TRAINING PROGRAM
Payer: COMMERCIAL

## 2021-03-24 LAB
ANION GAP SERPL CALCULATED.3IONS-SCNC: 5 MMOL/L (ref 3–14)
BUN SERPL-MCNC: 16 MG/DL (ref 7–30)
CALCIUM SERPL-MCNC: 7.4 MG/DL (ref 8.5–10.1)
CHLORIDE SERPL-SCNC: 116 MMOL/L (ref 94–109)
CO2 SERPL-SCNC: 24 MMOL/L (ref 20–32)
CORTIS SERPL-MCNC: 1.4 UG/DL (ref 4–22)
CREAT SERPL-MCNC: 0.75 MG/DL (ref 0.66–1.25)
ERYTHROCYTE [DISTWIDTH] IN BLOOD BY AUTOMATED COUNT: 14.1 % (ref 10–15)
GFR SERPL CREATININE-BSD FRML MDRD: >90 ML/MIN/{1.73_M2}
GLUCOSE SERPL-MCNC: 116 MG/DL (ref 70–99)
HCT VFR BLD AUTO: 29.1 % (ref 40–53)
HGB BLD-MCNC: 9.4 G/DL (ref 13.3–17.7)
INTERPRETATION ECG - MUSE: NORMAL
MAGNESIUM SERPL-MCNC: 2.2 MG/DL (ref 1.6–2.3)
MCH RBC QN AUTO: 29.8 PG (ref 26.5–33)
MCHC RBC AUTO-ENTMCNC: 32.3 G/DL (ref 31.5–36.5)
MCV RBC AUTO: 92 FL (ref 78–100)
PHOSPHATE SERPL-MCNC: 1.8 MG/DL (ref 2.5–4.5)
PLATELET # BLD AUTO: 179 10E9/L (ref 150–450)
POTASSIUM SERPL-SCNC: 3.5 MMOL/L (ref 3.4–5.3)
RBC # BLD AUTO: 3.15 10E12/L (ref 4.4–5.9)
SODIUM SERPL-SCNC: 144 MMOL/L (ref 133–144)
T4 FREE SERPL-MCNC: 1.02 NG/DL (ref 0.76–1.46)
WBC # BLD AUTO: 12.2 10E9/L (ref 4–11)

## 2021-03-24 PROCEDURE — 250N000013 HC RX MED GY IP 250 OP 250 PS 637: Performed by: STUDENT IN AN ORGANIZED HEALTH CARE EDUCATION/TRAINING PROGRAM

## 2021-03-24 PROCEDURE — 250N000011 HC RX IP 250 OP 636: Performed by: STUDENT IN AN ORGANIZED HEALTH CARE EDUCATION/TRAINING PROGRAM

## 2021-03-24 PROCEDURE — 97110 THERAPEUTIC EXERCISES: CPT | Mod: GO | Performed by: OCCUPATIONAL THERAPIST

## 2021-03-24 PROCEDURE — 84100 ASSAY OF PHOSPHORUS: CPT | Performed by: STUDENT IN AN ORGANIZED HEALTH CARE EDUCATION/TRAINING PROGRAM

## 2021-03-24 PROCEDURE — 82533 TOTAL CORTISOL: CPT | Performed by: STUDENT IN AN ORGANIZED HEALTH CARE EDUCATION/TRAINING PROGRAM

## 2021-03-24 PROCEDURE — 74018 RADEX ABDOMEN 1 VIEW: CPT

## 2021-03-24 PROCEDURE — 85027 COMPLETE CBC AUTOMATED: CPT | Performed by: STUDENT IN AN ORGANIZED HEALTH CARE EDUCATION/TRAINING PROGRAM

## 2021-03-24 PROCEDURE — 84439 ASSAY OF FREE THYROXINE: CPT | Performed by: STUDENT IN AN ORGANIZED HEALTH CARE EDUCATION/TRAINING PROGRAM

## 2021-03-24 PROCEDURE — 999N000128 HC STATISTIC PERIPHERAL IV START W/O US GUIDANCE

## 2021-03-24 PROCEDURE — 83735 ASSAY OF MAGNESIUM: CPT | Performed by: STUDENT IN AN ORGANIZED HEALTH CARE EDUCATION/TRAINING PROGRAM

## 2021-03-24 PROCEDURE — 250N000012 HC RX MED GY IP 250 OP 636 PS 637: Performed by: STUDENT IN AN ORGANIZED HEALTH CARE EDUCATION/TRAINING PROGRAM

## 2021-03-24 PROCEDURE — 258N000003 HC RX IP 258 OP 636: Performed by: STUDENT IN AN ORGANIZED HEALTH CARE EDUCATION/TRAINING PROGRAM

## 2021-03-24 PROCEDURE — 99291 CRITICAL CARE FIRST HOUR: CPT | Mod: GC | Performed by: PSYCHIATRY & NEUROLOGY

## 2021-03-24 PROCEDURE — 97530 THERAPEUTIC ACTIVITIES: CPT | Mod: GO | Performed by: OCCUPATIONAL THERAPIST

## 2021-03-24 PROCEDURE — 80048 BASIC METABOLIC PNL TOTAL CA: CPT | Performed by: STUDENT IN AN ORGANIZED HEALTH CARE EDUCATION/TRAINING PROGRAM

## 2021-03-24 PROCEDURE — 74018 RADEX ABDOMEN 1 VIEW: CPT | Mod: 26 | Performed by: RADIOLOGY

## 2021-03-24 PROCEDURE — 93005 ELECTROCARDIOGRAM TRACING: CPT

## 2021-03-24 PROCEDURE — 250N000013 HC RX MED GY IP 250 OP 250 PS 637: Performed by: NURSE PRACTITIONER

## 2021-03-24 PROCEDURE — 200N000002 HC R&B ICU UMMC

## 2021-03-24 PROCEDURE — 93010 ELECTROCARDIOGRAM REPORT: CPT | Performed by: INTERNAL MEDICINE

## 2021-03-24 PROCEDURE — 97535 SELF CARE MNGMENT TRAINING: CPT | Mod: GO | Performed by: OCCUPATIONAL THERAPIST

## 2021-03-24 RX ORDER — POTASSIUM CHLORIDE 7.45 MG/ML
10 INJECTION INTRAVENOUS
Status: COMPLETED | OUTPATIENT
Start: 2021-03-24 | End: 2021-03-24

## 2021-03-24 RX ORDER — HEPARIN SODIUM 5000 [USP'U]/.5ML
5000 INJECTION, SOLUTION INTRAVENOUS; SUBCUTANEOUS EVERY 8 HOURS
Status: DISCONTINUED | OUTPATIENT
Start: 2021-03-24 | End: 2021-03-26 | Stop reason: HOSPADM

## 2021-03-24 RX ORDER — BISACODYL 10 MG
10 SUPPOSITORY, RECTAL RECTAL DAILY
Status: DISCONTINUED | OUTPATIENT
Start: 2021-03-24 | End: 2021-03-26 | Stop reason: HOSPADM

## 2021-03-24 RX ORDER — DIVALPROEX SODIUM 500 MG/1
500 TABLET, EXTENDED RELEASE ORAL DAILY
Status: DISCONTINUED | OUTPATIENT
Start: 2021-03-25 | End: 2021-03-24

## 2021-03-24 RX ORDER — DIPHENHYDRAMINE HYDROCHLORIDE 50 MG/ML
25 INJECTION INTRAMUSCULAR; INTRAVENOUS ONCE
Status: DISCONTINUED | OUTPATIENT
Start: 2021-03-24 | End: 2021-03-24

## 2021-03-24 RX ORDER — METOCLOPRAMIDE HYDROCHLORIDE 5 MG/ML
10 INJECTION INTRAMUSCULAR; INTRAVENOUS ONCE
Status: COMPLETED | OUTPATIENT
Start: 2021-03-24 | End: 2021-03-24

## 2021-03-24 RX ORDER — SODIUM CHLORIDE 9 MG/ML
INJECTION, SOLUTION INTRAVENOUS CONTINUOUS
Status: DISCONTINUED | OUTPATIENT
Start: 2021-03-24 | End: 2021-03-26

## 2021-03-24 RX ORDER — DIVALPROEX SODIUM 500 MG/1
500 TABLET, EXTENDED RELEASE ORAL ONCE
Status: COMPLETED | OUTPATIENT
Start: 2021-03-24 | End: 2021-03-24

## 2021-03-24 RX ORDER — LABETALOL HYDROCHLORIDE 5 MG/ML
10-40 INJECTION, SOLUTION INTRAVENOUS EVERY 10 MIN PRN
Status: DISCONTINUED | OUTPATIENT
Start: 2021-03-24 | End: 2021-03-26 | Stop reason: HOSPADM

## 2021-03-24 RX ORDER — HYDRALAZINE HYDROCHLORIDE 20 MG/ML
10-20 INJECTION INTRAMUSCULAR; INTRAVENOUS EVERY 30 MIN PRN
Status: DISCONTINUED | OUTPATIENT
Start: 2021-03-24 | End: 2021-03-26 | Stop reason: HOSPADM

## 2021-03-24 RX ORDER — METOCLOPRAMIDE HYDROCHLORIDE 5 MG/ML
10 INJECTION INTRAMUSCULAR; INTRAVENOUS EVERY 6 HOURS
Status: DISCONTINUED | OUTPATIENT
Start: 2021-03-24 | End: 2021-03-24

## 2021-03-24 RX ORDER — MAGNESIUM SULFATE HEPTAHYDRATE 40 MG/ML
2 INJECTION, SOLUTION INTRAVENOUS ONCE
Status: COMPLETED | OUTPATIENT
Start: 2021-03-24 | End: 2021-03-24

## 2021-03-24 RX ADMIN — OMEPRAZOLE 20 MG: 20 CAPSULE, DELAYED RELEASE ORAL at 21:34

## 2021-03-24 RX ADMIN — MAGNESIUM SULFATE IN WATER 2 G: 40 INJECTION, SOLUTION INTRAVENOUS at 10:21

## 2021-03-24 RX ADMIN — HYDROMORPHONE HYDROCHLORIDE 0.5 MG: 1 INJECTION, SOLUTION INTRAMUSCULAR; INTRAVENOUS; SUBCUTANEOUS at 07:42

## 2021-03-24 RX ADMIN — BISACODYL 10 MG: 10 SUPPOSITORY RECTAL at 13:09

## 2021-03-24 RX ADMIN — METOCLOPRAMIDE 10 MG: 5 INJECTION, SOLUTION INTRAMUSCULAR; INTRAVENOUS at 18:48

## 2021-03-24 RX ADMIN — OXYCODONE HYDROCHLORIDE 10 MG: 10 TABLET ORAL at 10:21

## 2021-03-24 RX ADMIN — OXYCODONE HYDROCHLORIDE 10 MG: 10 TABLET ORAL at 02:55

## 2021-03-24 RX ADMIN — DEXAMETHASONE 3 MG: 1.5 TABLET ORAL at 21:38

## 2021-03-24 RX ADMIN — HYDROXYZINE HYDROCHLORIDE 10 MG: 10 TABLET, FILM COATED ORAL at 13:46

## 2021-03-24 RX ADMIN — HEPARIN SODIUM 5000 UNITS: 5000 INJECTION, SOLUTION INTRAVENOUS; SUBCUTANEOUS at 13:00

## 2021-03-24 RX ADMIN — DOCUSATE SODIUM 50 MG AND SENNOSIDES 8.6 MG 2 TABLET: 8.6; 5 TABLET, FILM COATED ORAL at 07:46

## 2021-03-24 RX ADMIN — DEXAMETHASONE 3 MG: 1.5 TABLET ORAL at 13:46

## 2021-03-24 RX ADMIN — POLYETHYLENE GLYCOL 3350 17 G: 17 POWDER, FOR SOLUTION ORAL at 07:46

## 2021-03-24 RX ADMIN — POLYETHYLENE GLYCOL 3350 17 G: 17 POWDER, FOR SOLUTION ORAL at 13:45

## 2021-03-24 RX ADMIN — DIVALPROEX SODIUM 500 MG: 500 TABLET, EXTENDED RELEASE ORAL at 23:17

## 2021-03-24 RX ADMIN — HYDROMORPHONE HYDROCHLORIDE 0.5 MG: 1 INJECTION, SOLUTION INTRAMUSCULAR; INTRAVENOUS; SUBCUTANEOUS at 13:46

## 2021-03-24 RX ADMIN — DEXAMETHASONE SODIUM PHOSPHATE 3 MG: 4 INJECTION, SOLUTION INTRAMUSCULAR; INTRAVENOUS at 06:15

## 2021-03-24 RX ADMIN — OMEPRAZOLE 20 MG: 20 CAPSULE, DELAYED RELEASE ORAL at 07:46

## 2021-03-24 RX ADMIN — ONDANSETRON 4 MG: 2 INJECTION INTRAMUSCULAR; INTRAVENOUS at 07:46

## 2021-03-24 RX ADMIN — HEPARIN SODIUM 5000 UNITS: 5000 INJECTION, SOLUTION INTRAVENOUS; SUBCUTANEOUS at 21:35

## 2021-03-24 RX ADMIN — LABETALOL HYDROCHLORIDE 20 MG: 5 INJECTION, SOLUTION INTRAVENOUS at 07:40

## 2021-03-24 RX ADMIN — ACETAMINOPHEN 975 MG: 325 TABLET, FILM COATED ORAL at 10:21

## 2021-03-24 RX ADMIN — POTASSIUM CHLORIDE 10 MEQ: 7.46 INJECTION, SOLUTION INTRAVENOUS at 06:16

## 2021-03-24 RX ADMIN — POTASSIUM CHLORIDE 10 MEQ: 7.46 INJECTION, SOLUTION INTRAVENOUS at 08:01

## 2021-03-24 RX ADMIN — LEVETIRACETAM 750 MG: 750 TABLET, FILM COATED ORAL at 07:46

## 2021-03-24 RX ADMIN — METOCLOPRAMIDE 10 MG: 5 INJECTION, SOLUTION INTRAMUSCULAR; INTRAVENOUS at 10:21

## 2021-03-24 RX ADMIN — SODIUM CHLORIDE: 9 INJECTION, SOLUTION INTRAVENOUS at 10:22

## 2021-03-24 RX ADMIN — LEVETIRACETAM 750 MG: 750 TABLET, FILM COATED ORAL at 21:35

## 2021-03-24 RX ADMIN — ACETAMINOPHEN 975 MG: 325 TABLET, FILM COATED ORAL at 02:58

## 2021-03-24 RX ADMIN — ACETAMINOPHEN 975 MG: 325 TABLET, FILM COATED ORAL at 18:19

## 2021-03-24 RX ADMIN — HYDROMORPHONE HYDROCHLORIDE 0.5 MG: 1 INJECTION, SOLUTION INTRAMUSCULAR; INTRAVENOUS; SUBCUTANEOUS at 21:32

## 2021-03-24 ASSESSMENT — ACTIVITIES OF DAILY LIVING (ADL)
ADLS_ACUITY_SCORE: 18

## 2021-03-24 ASSESSMENT — VISUAL ACUITY
OU: BLURRED VISION;DOUBLE VISION/DIPLOPIA
OU: OTHER (SEE COMMENT)
OU: OTHER (SEE COMMENT)
OU: BLURRED VISION;DOUBLE VISION/DIPLOPIA
OU: OTHER (SEE COMMENT)
OU: OTHER (SEE COMMENT)

## 2021-03-24 NOTE — PLAN OF CARE
"Major Shift Events:    -Q1 neuro checks until 0400. Very limited participation in exams. Pt gets frustrated and repeats \"oh my god. Help;\" seems to be actively avoiding participating  -Pt repeatedly spit out oral meds when given with 1 exception   -Rass -1 to +2  -PRN IV dilaudid given d/t pt not tolerating PO meds   -MRI obtained  -Bps <140; did not require labetalol or hydralazine   -zofran given x1 for nausea during MRI       Plan: Q2 neuro checks. Possible EEG today.       For vital signs and complete assessments, please see documentation flowsheets.   "

## 2021-03-24 NOTE — PROGRESS NOTES
"[facility]  Neurocritical Care Progress Note    Patient Name:  Vincenzo Avery  MRN:  [id number]    :  1975  Primary care provider:  No Ref-Primary, Physician  Date of Admission: 3/22/2021  Date of Service:  2021       Subjective:  No acute events overnight. Hemodynamically stable. No new endorsed symptoms. Per RN patient is ocassionally frustrated, saying \"oh my god. Help,\" spits out oral meds at times. Required prn dilaudid x 2.     ROS: 4 point review of systems otherwise negative. Unable to obtain due to mental status.    Objective:  Medications:  Current Facility-Administered Medications   Medication     [START ON 3/25/2021] acetaminophen (TYLENOL) tablet 650 mg     acetaminophen (TYLENOL) tablet 975 mg     benzocaine-menthol (CEPACOL) 15-3.6 MG lozenge 1 lozenge     dexamethasone (DECADRON) tablet 3 mg    Followed by     [START ON 3/25/2021] dexamethasone (DECADRON) tablet 2 mg    Followed by     [START ON 3/26/2021] dexamethasone (DECADRON) tablet 1 mg     hydrALAZINE (APRESOLINE) injection 10-20 mg     HYDROmorphone (PF) (DILAUDID) injection 0.3-0.5 mg     hydrOXYzine (ATARAX) tablet 10 mg     [START ON 3/25/2021] influenza quadrivalent (PF) vacc (FLUZONE) injection 0.5 mL     labetalol (NORMODYNE/TRANDATE) injection 10-40 mg     levETIRAcetam (KEPPRA) tablet 750 mg     lidocaine (LMX4) cream     lidocaine 1 % 0.1-1 mL     omeprazole (priLOSEC) CR capsule 20 mg     ondansetron (ZOFRAN-ODT) ODT tab 4 mg    Or     ondansetron (ZOFRAN) injection 4 mg     oxyCODONE IR (ROXICODONE) tablet 10 mg     polyethylene glycol (MIRALAX) Packet 17 g     potassium chloride 10 mEq in 100 mL sterile water intermittent infusion (premix)     prochlorperazine (COMPAZINE) injection 10 mg    Or     prochlorperazine (COMPAZINE) tablet 10 mg     senna-docusate (SENOKOT-S/PERICOLACE) 8.6-50 MG per tablet 2 tablet     sodium chloride (PF) 0.9% PF flush 3 mL     sodium chloride (PF) " "0.9% PF flush 3 mL     sodium chloride (PF) 0.9% PF flush 3 mL     Vitals:    /77 (BP Location: Right arm)   Pulse 67   Temp 99.5  F (37.5  C) (Axillary)   Resp 14   Ht 1.753 m (5' 9\")   Wt 103.4 kg (227 lb 15.3 oz)   SpO2 92%   BMI 33.66 kg/m      Vent Settings:  Not intubated. Breathing RA.   Resp: 14    Intake/Output Last 24Hrs :     Intake/Output Summary (Last 24 hours) at 3/24/2021 0721  Last data filed at 3/24/2021 0700  Gross per 24 hour   Intake 3152 ml   Output 4280 ml   Net -1128 ml     Gen: Laying in hospital bed. NAD. Appears to be in pain.   HEENT: Swelling and bruising over R eye.   CV: Appears well perfused  Lungs: no respiratory distress  Abd: non-distended  Ext: no edema  Skin: no rashes or lesions noted on visualized skin    Neuro:   MSE- Able to follow commands. Oriented to self. Responds to questions. Appears to be in pain.   CN-Unable to observe R eye due to swelling. EOMI in L eye. L eye pupils reactive to light. Able to grimace. Facial sensation intact. Able to stick out tongue. Shoulder shrug strong. Hearing intact to conversation.   Motor- FNF intact. Moves all extremities spontaneously. 4/5 upper and lower extremities strength throughout - pain limited strength.  Reflexes - Patellars present but diminished. Toes mute. Biceps unable to be elicited.  Sensation- light touch intact throughout.   Gait- deferred.    Labs/Imaging:    [id number]  Urine Spec grav - 1.016  AM cortisol - 1.4 (low)    Lab Results   Component Value Date    PH 7.34 (L) 03/22/2021    PO2 114 (H) 03/22/2021    PCO2 48 (H) 03/22/2021    HCO3 26 03/22/2021          Lab Results   Component Value Date     03/24/2021    Lab Results   Component Value Date    CHLORIDE 116 03/24/2021    Lab Results   Component Value Date    BUN 16 03/24/2021      Lab Results   Component Value Date    POTASSIUM 3.5 03/24/2021    Lab Results   Component Value Date    CO2 24 03/24/2021    Lab Results   Component Value Date    CR 0.75 " 03/24/2021        Lab Results   Component Value Date    WBC 12.2 (H) 03/24/2021    HGB 9.4 (L) 03/24/2021    HCT 29.1 (L) 03/24/2021    MCV 92 03/24/2021     03/24/2021     Lab Results   Component Value Date     03/24/2021    POTASSIUM 3.5 03/24/2021    CHLORIDE 116 (H) 03/24/2021    CO2 24 03/24/2021     (H) 03/24/2021     Lab Results   Component Value Date     (H) 03/24/2021     Lab Results   Component Value Date    HGB 9.4 (L) 03/24/2021     Lab Results   Component Value Date    AST 33 03/23/2021    ALT 19 03/23/2021    ALKPHOS 55 03/23/2021    BILITOTAL 0.2 03/23/2021     Lab Results   Component Value Date     03/24/2021     Lab Results   Component Value Date    WBC 12.2 (H) 03/24/2021         MRI 3/23-  Impression:  1. Postoperative changes consistent with right frontal temporal  craniotomy and partial tumor resection. Residual mass measures 4.0 x  3.1 x 3.3 cm.  2. Vasogenic edema and mass effect in the right cranial fossa similar  to prior.  a. Unchanged 6 mm leftward midline shift.  b. Mass effect on the harsha and midbrain similar to prior.  2. Postoperative pneumocephalus including air accumulations anterior  and lateral to the frontal lobes.  3. Postoperative blood products accumulation deep to the craniotomy.  Additional blood products accumulation anterior to the harsha and  midbrain, and posterior to the left cerebellum.  4. Scalp hematoma and edema similar to prior measuring up to 2 cm.  5. Subcutaneous emphysema adjacent to the craniotomy which extends  into the masseter space, and along the zygomatic arch into the  periorbital tissues.    Assessment and Plan:  46yoM with h/o pituitary macroadenoma s/p endoscopic transnasal resection 6/2018 who presented for stealth assisted right frontotemporal craniotomy and resection of residual pituitary macroadenoma.     Neuro  #Pituitary macroadenoma s/p endoscopic transnasal resection in 6/2018  #Residual pituitary macroadenoma  s/p stealth assisted right frontotemporal craniotomy and resection on 3/22, POD#2  -Neurochecks Q2hr  -SBP Goal:  <160  -Steroid taper per neurosurgery: dexamethasone 4mg q8hr x3 doses, 3mg q8hr x3 doses, 2mg q8hr x3 doses, 1mg q8hr x3 doses.   -Omeprazole 20mg twice daily while on high dose steroids  -Levetiracetam 750mg twice daily     Pain/sedation management:  -Give Reglan 10mg x 1 pending normal EKG and Magnesium 2g x 1 (headache cocktail)  -Scheduled acetaminophen 975mg every 8 hours  -PRN acetaminophen 650mg   -Scheduled oxycodone 10mg every 6 hours  -PRN hydromorphone  -PRN hydroxyzine 10mg     Resp  #Respiratory insufficiency, post-operative  #Intubated and sedated for surgical procedure, extubated 3/22  Stable on RA  -O2 goal >90%     CV  #Hypertension  Post-operatively likely associated with pain, no history of hypertension. Not on antihypertensive medications PTA.  -SBP goal <160  -PRN labetalol and hydralazine to meet SBP goal     Renal  #Polyuria, resolved  Had suspicion for DI, but less likely with reassuring labs, less UOP. uOSM 555, urine spec grav 1.016. Na today is 144.   -Smart in place  -Strict I&Os  -Daily BMP    Endo  No acute issues.   Glucose this . A1C 5.5. Morning Cortisol today was low at 1.4      Heme  #Normocytic anemia, acute blood loss  Per chart review, estimated blood loss in the OR on 3/22 was 500mL.  Hgb 9.4 this morning compared to 13 on admission.  -Daily CBC  -Hgb goal >7  -Platelet goal >100k  -Transfuse to meet hgb and platelet goals     GI  #Constipation  LFT's WNL today.   -Abdominal Xray to evaluate constipation  Last BM: prior to admission  Bowel regimen: scheduled Miralax and senna-doc. Add dulcolax suppository today.     ID  #Leukocytosis  Likely combination of procedural stress response and demargination in the setting of high dose steroids. WBC 12.2. Afebrile, no other ssx of infection.  -CBC daily  -Trend fever curve     Antibiotics:  None currently     FEN:  Advance diet as tolerated, maintenance normal saline at 50mL/hr, monitor lytes and replace as needed  PPX: DVT - SCDs, start subcutaneous heparin today; GI - omeprazole while in high dose steroids.  Lines, tubes, drains: PIVs, A-line, jaeger  Code: Prior, full  Dispo: ICU pending clinical stability.      Patient seen and discussed with attending Dr. Pierson.    Sam Coronel, MS4    RESIDENT ATTESTATION  I, Francesca Walker, was present with the medical/CHERYL student who participated in the service and in the documentation of the note.  I have verified the history and personally performed the physical exam and medical decision making.  I agree with the assessment and plan of care as documented in the note. I have reviewed this note and ensured that it correctly reflects the assessment and plan of the [facility] service.     Francesca Walker MD  PGY-3 Neurology  Date of Service (when I saw the patient): 03/24/21

## 2021-03-24 NOTE — PHARMACY-ADMISSION MEDICATION HISTORY
Admission medication history interview status for the 3/22/2021 admission is complete. See Epic admission navigator for allergy information, pharmacy, prior to admission medications and immunization status.     Medication history interview sources:  [facility] chart review and [email or web address] (Rx fill history website); NO patient/family interview; reviewed Pre-Surgery Visit encounter on 3/16/2021--> no medications listed prior to admission    Changes made to PTA medication list (reason)  Added: none  Deleted: none  Changed: none    Additional medication history information (including reliability of information, actions taken by pharmacist): none      Prior to Admission medications    Not on File         Medication history completed by: Hilda Woods, LorieD

## 2021-03-24 NOTE — PROGRESS NOTES
"[facility], [place]   Neurosurgery Progress Note:    Assessment: Vincenzo Avery is a 46 year old man with history of pituitary macroadenoma s/p endonasal endoscopic partial resection in 6/2018 by Elena Neely and Christal who underwent elective right craniotomy for open resection on 3/22,    Plan:  - Serial neuro exams  - Pain control  - 5-day dexamethasone taper  - 7-day Keppra seizure prophylaxis  - HOB > 30 degrees  - Advance diet as tolerated  - Bowel regimen  - PRN antiemetics  - IVF until taking adequate PO  - PT/OT  - SCDs for DVT proph    Greatly appreciate the help from PT/OT in caring for Vincenzo Avery    -----------------------------------  Tonny Perry MD  Neurosurgery PGY-2    Interval History/Subjective: Anxious in PACU. Transferred to . Plan for CT head in AM.    Objective:   Temp:  [96.8  F (36  C)-99.9  F (37.7  C)] 98.2  F (36.8  C)  Pulse:  [] 98  Resp:  [11-21] 18  BP: ()/(68-93) 132/77  MAP:  [80 mmHg-112 mmHg] 102 mmHg  Arterial Line BP: (118-179)/(58-88) 159/76  SpO2:  [93 %-99 %] 95 %  I/O last 3 completed shifts:  In: 4684.33 [I.V.:4684.33]  Out: 5835 [Urine:5335; Blood:500]    Gen: Anxious, frequently stating \"oh god\"  Wound: covered with clean Primapore dressing; closed with nylon suture  Neurologic:  - Alert & Oriented to person only; not oriented to place or year  - Follows simple commands intermittently  - Speech fluent, spontaneous  - No gaze preference. No apparent hemineglect.  - Right CN3 palsy with ptosis, fixed mydriasis, and restricted medial and upward gaze; left eye full EOM with temporal field deficit  - Facial movements symmetric except above right ptosis  - Not participating in pronator drift testing     Del Tr Bi WE WF Gr   R 5 5 5 5 5 5   L 5 5 5 5 5 5    HF KE KF DF PF EHL   R 5 5 5 5 5 5   L 5 5 5 5 5 5     Reflexes symmetric; no hyperreflexia    Sensation intact and symmetric to light touch throughout    LABS  Recent Labs "   Lab Test 03/23/21 0302 03/22/21 1752 03/22/21  1213 06/30/18  0646 06/30/18  0646 06/29/18  1753   WBC 12.5*  --   --   --  10.4 7.1   HGB 10.0* 11.2* 11.3*   < > 7.6* 8.5*   MCV 91  --   --   --  89 90     --   --   --  163 148*    < > = values in this interval not displayed.       Recent Labs   Lab Test 03/23/21 2014 03/23/21  1022 03/23/21 0302 03/22/21  1752 03/22/21  1213 03/22/21  0601 09/17/19  1331 07/26/18  1656    145* 142 141 136  --  137 140   POTASSIUM  --   --  4.2 4.5 3.7 4.0 3.9 4.4   CHLORIDE  --   --  112*  --   --   --  109 106   CO2  --   --  26  --   --   --  24 29   BUN  --   --  14  --   --   --  20 16   CR  --   --  0.88  --   --  1.03 0.82 0.95   ANIONGAP  --   --  4  --   --   --  4 5   KANA  --   --  7.8*  --   --   --  9.2 9.1   GLC  --   --  129* 155* 105*  --  92 48*       IMAGING  Recent Results (from the past 24 hour(s))   CT Head w/o Contrast    Narrative    CT HEAD W/O CONTRAST 3/23/2021 7:11 AM    History: Status post right craniotomy for tumor resection     Comparison: MR and CT 3/22/2021    Technique: Using multidetector thin collimation helical acquisition  technique, axial, coronal and sagittal CT images from the skull base  to the vertex were obtained without intravenous contrast.    Findings:   Postsurgical changes of right frontal temporal craniotomy and right  parasellar pituitary mass resection. There are minimal blood products  along the linear resection site. There is residual hyper dense mass  measuring 3.4 x 2.5 x 3.7 cm. Perioperative pneumocephalus with air  accumulations anterior and lateral to the right frontal lobe measuring  up to 1.5 cm, anterior to the left frontal lobe measuring up to 9 mm.    Additional small volume blood product accumulations. This includes a  blood product accumulation extending along the prior transsphenoidal  craniotomy, see series 3 image 16. There is a small blood product  accumulation posterior to the left cerebellum,  see series 3 image 14.  Small volume blood product accumulations anterior to the midbrain harsha  and medulla, see series 5 image 32.    There is mass effect with effacement of the basal cisterns in the  right lateral aspect of the harsha and cerebellar peduncle. There is  effacement of the lateral ventricles, and a 6 mm leftward midline  shift.    Hypoattenuation in the anterior right temporal lobe, presumably  representing vasogenic edema. Gray-white matter differentiation  appears otherwise intact.    Mucosal thickening in the paranasal sinuses similar to prior. The  mastoid air cells are clear. Subcutaneous emphysema extending into the  masseter space, along the right zygoma and about the right orbit.  Scalp edema and blood products accumulation on the right measuring up  to 2 cm.       Impression    Impression:  1. Postsurgical changes of right frontotemporal craniotomy and right  parasellar tumor biopsy or partial resection.  2. Residual hyperdense mass.  3. Perioperative pneumocephalus.  4. Extra-axial blood product accumulations along the anterior midbrain  and harsha, and posterior to the left cerebellum.  5. Subcutaneous emphysema adjacent to the craniotomy which extends  into the  space and about the orbits.  6. Scalp hematoma and edema adjacent to the craniotomy.    I have personally reviewed the examination and initial interpretation  and I agree with the findings.    SCOTT MCCALLUM MD     I have reviewed the history. I have seen and examined the patient myself and agree with the assessment and plan above.  ODALYS Neely MD

## 2021-03-24 NOTE — PROGRESS NOTES
"[facility], [place]   Neurosurgery Progress Note:    Assessment: Vincenzo Avery is a 46 year old man with history of pituitary macroadenoma s/p endonasal endoscopic partial resection in 6/2018 by Elena Neely and Christal who underwent elective right craniotomy for open resection on 3/22.    Plan:  - Serial neuro exams  - Pain control  - 5-day dexamethasone taper  - 7-day Keppra seizure prophylaxis  - HOB > 30 degrees  - Advance diet as tolerated  - Bowel regimen  - PRN antiemetics  - IVF until taking adequate PO  - PT/OT  - SCDs for DVT proph; discuss SQH today  - Follow up AM cortisol and free T4    Greatly appreciate the help from PT/OT in caring for Vincenzo Avery    -----------------------------------  Tonny Prery MD  Neurosurgery PGY-2    Interval History/Subjective: MRI brain aborted during day due to patient nausea/vomiting. MRI brain completed overnight, demonstrating residual mass and operative site blood products. Exam stable with slightly improved anxiety.    Objective:   Temp:  [97.3  F (36.3  C)-99.7  F (37.6  C)] 99.7  F (37.6  C)  Pulse:  [56-98] 76  Resp:  [11-21] 16  BP: (121-138)/(77-93) 132/77  MAP:  [73 mmHg-112 mmHg] 73 mmHg  Arterial Line BP: (115-179)/(52-88) 120/52  SpO2:  [91 %-99 %] 92 %  I/O last 3 completed shifts:  In: 4413.33 [P.O.:120; I.V.:4293.33]  Out: 5750 [Urine:5750]    Gen: Anxious, frequently stating \"oh god\"  Wound: covered with clean Primapore dressing; closed with nylon suture  Neurologic:  - Alert & Oriented to person only; not oriented to place or year  - Follows simple commands intermittently  - Speech fluent, spontaneous  - No gaze preference. No apparent hemineglect.  - Right CN3 palsy with ptosis, fixed mydriasis, and restricted medial and upward gaze; left eye full EOM with temporal field deficit  - Facial movements symmetric except above right ptosis  - Not participating in pronator drift testing     Del Tr Bi WE WF Gr   R 5 5 5 5 5 5 "   L 5 5 5 5 5 5    HF KE KF DF PF EHL   R 5 5 5 5 5 5   L 5 5 5 5 5 5     Reflexes symmetric; no hyperreflexia    Sensation intact and symmetric to light touch throughout    LABS  Recent Labs   Lab Test 03/23/21 0302 03/22/21 1752 03/22/21 1213 06/30/18  0646 06/30/18  0646 06/29/18  1753   WBC 12.5*  --   --   --  10.4 7.1   HGB 10.0* 11.2* 11.3*   < > 7.6* 8.5*   MCV 91  --   --   --  89 90     --   --   --  163 148*    < > = values in this interval not displayed.       Recent Labs   Lab Test 03/23/21 2014 03/23/21  1022 03/23/21 0302 03/22/21 1752 03/22/21  1213 03/22/21  0601 09/17/19  1331 07/26/18  1656    145* 142 141 136  --  137 140   POTASSIUM  --   --  4.2 4.5 3.7 4.0 3.9 4.4   CHLORIDE  --   --  112*  --   --   --  109 106   CO2  --   --  26  --   --   --  24 29   BUN  --   --  14  --   --   --  20 16   CR  --   --  0.88  --   --  1.03 0.82 0.95   ANIONGAP  --   --  4  --   --   --  4 5   KANA  --   --  7.8*  --   --   --  9.2 9.1   GLC  --   --  129* 155* 105*  --  92 48*       IMAGING  Recent Results (from the past 24 hour(s))   CT Head w/o Contrast    Narrative    CT HEAD W/O CONTRAST 3/23/2021 7:11 AM    History: Status post right craniotomy for tumor resection     Comparison: MR and CT 3/22/2021    Technique: Using multidetector thin collimation helical acquisition  technique, axial, coronal and sagittal CT images from the skull base  to the vertex were obtained without intravenous contrast.    Findings:   Postsurgical changes of right frontal temporal craniotomy and right  parasellar pituitary mass resection. There are minimal blood products  along the linear resection site. There is residual hyper dense mass  measuring 3.4 x 2.5 x 3.7 cm. Perioperative pneumocephalus with air  accumulations anterior and lateral to the right frontal lobe measuring  up to 1.5 cm, anterior to the left frontal lobe measuring up to 9 mm.    Additional small volume blood product accumulations. This  includes a  blood product accumulation extending along the prior transsphenoidal  craniotomy, see series 3 image 16. There is a small blood product  accumulation posterior to the left cerebellum, see series 3 image 14.  Small volume blood product accumulations anterior to the midbrain harsha  and medulla, see series 5 image 32.    There is mass effect with effacement of the basal cisterns in the  right lateral aspect of the harsha and cerebellar peduncle. There is  effacement of the lateral ventricles, and a 6 mm leftward midline  shift.    Hypoattenuation in the anterior right temporal lobe, presumably  representing vasogenic edema. Gray-white matter differentiation  appears otherwise intact.    Mucosal thickening in the paranasal sinuses similar to prior. The  mastoid air cells are clear. Subcutaneous emphysema extending into the  masseter space, along the right zygoma and about the right orbit.  Scalp edema and blood products accumulation on the right measuring up  to 2 cm.       Impression    Impression:  1. Postsurgical changes of right frontotemporal craniotomy and right  parasellar tumor biopsy or partial resection.  2. Residual hyperdense mass.  3. Perioperative pneumocephalus.  4. Extra-axial blood product accumulations along the anterior midbrain  and harsha, and posterior to the left cerebellum.  5. Subcutaneous emphysema adjacent to the craniotomy which extends  into the  space and about the orbits.  6. Scalp hematoma and edema adjacent to the craniotomy.    I have personally reviewed the examination and initial interpretation  and I agree with the findings.    SCOTT MCCALLUM MD   MR Brain w/o & w Contrast    Narrative         Impression    Impression:  1. Postoperative changes consistent with right frontal temporal  craniotomy and partial tumor resection. Residual mass measures 4.0 x  3.1 x 3.3 cm.  2. Vasogenic edema and mass effect in the right cranial fossa similar  to prior.  a. Unchanged 6 mm  leftward midline shift.  b. Mass effect on the harsha and midbrain similar to prior.  2. Postoperative pneumocephalus including air accumulations anterior  and lateral to the frontal lobes.  3. Postoperative blood products accumulation deep to the craniotomy.  Additional blood products accumulation anterior to the harsha and  midbrain, and posterior to the left cerebellum.  4. Scalp hematoma and edema similar to prior measuring up to 2 cm.  5. Subcutaneous emphysema adjacent to the craniotomy which extends  into the masseter space, and along the zygomatic arch into the  periorbital tissues.     I have reviewed the history above and agree with the resident's assessment and plan.  ODALYS Neely MD

## 2021-03-24 NOTE — PLAN OF CARE
Major Shift Events:  Neuro status improving--Oriented x3-4. Inconsistently able to follow commands, slow to respond. R pupil fixed and dilated, L responsive and round. Equal strength. Labetalol given x1 for SBP goal <160. On RA. Adequate output via jaeger. Passing gas, no BM, suppository given in addition to miralax/senna. Up to chair for majority of shift. Ambulated to bathroom. Reglan given for headache pain with good effect. No complaints of nausea. Advanced to regular diet.     Plan: Encourage ambulation and PO intake. Manage headache pain. Keep SBP <160.     For vital signs and complete assessments, please see documentation flowsheets.

## 2021-03-25 ENCOUNTER — APPOINTMENT (OUTPATIENT)
Dept: PHYSICAL THERAPY | Facility: CLINIC | Age: 46
End: 2021-03-25
Attending: STUDENT IN AN ORGANIZED HEALTH CARE EDUCATION/TRAINING PROGRAM
Payer: COMMERCIAL

## 2021-03-25 ENCOUNTER — APPOINTMENT (OUTPATIENT)
Dept: OCCUPATIONAL THERAPY | Facility: CLINIC | Age: 46
End: 2021-03-25
Attending: NEUROLOGICAL SURGERY
Payer: COMMERCIAL

## 2021-03-25 ENCOUNTER — TELEPHONE (OUTPATIENT)
Dept: OPHTHALMOLOGY | Facility: CLINIC | Age: 46
End: 2021-03-25

## 2021-03-25 LAB
ANION GAP SERPL CALCULATED.3IONS-SCNC: 6 MMOL/L (ref 3–14)
BUN SERPL-MCNC: 22 MG/DL (ref 7–30)
CALCIUM SERPL-MCNC: 8.2 MG/DL (ref 8.5–10.1)
CHLORIDE SERPL-SCNC: 109 MMOL/L (ref 94–109)
CO2 SERPL-SCNC: 26 MMOL/L (ref 20–32)
CREAT SERPL-MCNC: 0.85 MG/DL (ref 0.66–1.25)
ERYTHROCYTE [DISTWIDTH] IN BLOOD BY AUTOMATED COUNT: 13.8 % (ref 10–15)
GFR SERPL CREATININE-BSD FRML MDRD: >90 ML/MIN/{1.73_M2}
GLUCOSE SERPL-MCNC: 110 MG/DL (ref 70–99)
HCT VFR BLD AUTO: 28 % (ref 40–53)
HGB BLD-MCNC: 9 G/DL (ref 13.3–17.7)
MAGNESIUM SERPL-MCNC: 2.6 MG/DL (ref 1.6–2.3)
MCH RBC QN AUTO: 29.5 PG (ref 26.5–33)
MCHC RBC AUTO-ENTMCNC: 32.1 G/DL (ref 31.5–36.5)
MCV RBC AUTO: 92 FL (ref 78–100)
PHOSPHATE SERPL-MCNC: 2.5 MG/DL (ref 2.5–4.5)
PLATELET # BLD AUTO: 158 10E9/L (ref 150–450)
POTASSIUM SERPL-SCNC: 3.9 MMOL/L (ref 3.4–5.3)
RBC # BLD AUTO: 3.05 10E12/L (ref 4.4–5.9)
SODIUM SERPL-SCNC: 141 MMOL/L (ref 133–144)
WBC # BLD AUTO: 10.1 10E9/L (ref 4–11)

## 2021-03-25 PROCEDURE — 250N000013 HC RX MED GY IP 250 OP 250 PS 637: Performed by: STUDENT IN AN ORGANIZED HEALTH CARE EDUCATION/TRAINING PROGRAM

## 2021-03-25 PROCEDURE — 250N000012 HC RX MED GY IP 250 OP 636 PS 637: Performed by: STUDENT IN AN ORGANIZED HEALTH CARE EDUCATION/TRAINING PROGRAM

## 2021-03-25 PROCEDURE — 97530 THERAPEUTIC ACTIVITIES: CPT | Mod: GP

## 2021-03-25 PROCEDURE — 97161 PT EVAL LOW COMPLEX 20 MIN: CPT | Mod: GP

## 2021-03-25 PROCEDURE — 250N000013 HC RX MED GY IP 250 OP 250 PS 637: Performed by: NURSE PRACTITIONER

## 2021-03-25 PROCEDURE — 97530 THERAPEUTIC ACTIVITIES: CPT | Mod: GO | Performed by: OCCUPATIONAL THERAPIST

## 2021-03-25 PROCEDURE — 85027 COMPLETE CBC AUTOMATED: CPT | Performed by: STUDENT IN AN ORGANIZED HEALTH CARE EDUCATION/TRAINING PROGRAM

## 2021-03-25 PROCEDURE — 258N000003 HC RX IP 258 OP 636: Performed by: STUDENT IN AN ORGANIZED HEALTH CARE EDUCATION/TRAINING PROGRAM

## 2021-03-25 PROCEDURE — 80048 BASIC METABOLIC PNL TOTAL CA: CPT | Performed by: STUDENT IN AN ORGANIZED HEALTH CARE EDUCATION/TRAINING PROGRAM

## 2021-03-25 PROCEDURE — 250N000011 HC RX IP 250 OP 636: Performed by: STUDENT IN AN ORGANIZED HEALTH CARE EDUCATION/TRAINING PROGRAM

## 2021-03-25 PROCEDURE — 120N000002 HC R&B MED SURG/OB UMMC

## 2021-03-25 PROCEDURE — 250N000009 HC RX 250: Performed by: NEUROLOGICAL SURGERY

## 2021-03-25 PROCEDURE — 83735 ASSAY OF MAGNESIUM: CPT | Performed by: STUDENT IN AN ORGANIZED HEALTH CARE EDUCATION/TRAINING PROGRAM

## 2021-03-25 PROCEDURE — 84100 ASSAY OF PHOSPHORUS: CPT | Performed by: STUDENT IN AN ORGANIZED HEALTH CARE EDUCATION/TRAINING PROGRAM

## 2021-03-25 PROCEDURE — 97535 SELF CARE MNGMENT TRAINING: CPT | Mod: GO | Performed by: OCCUPATIONAL THERAPIST

## 2021-03-25 PROCEDURE — 258N000003 HC RX IP 258 OP 636: Performed by: NEUROLOGICAL SURGERY

## 2021-03-25 RX ADMIN — ACETAMINOPHEN 325 MG: 325 TABLET ORAL at 20:58

## 2021-03-25 RX ADMIN — HEPARIN SODIUM 5000 UNITS: 5000 INJECTION, SOLUTION INTRAVENOUS; SUBCUTANEOUS at 22:45

## 2021-03-25 RX ADMIN — ACETAMINOPHEN 975 MG: 325 TABLET, FILM COATED ORAL at 19:28

## 2021-03-25 RX ADMIN — POLYETHYLENE GLYCOL 3350 17 G: 17 POWDER, FOR SOLUTION ORAL at 13:40

## 2021-03-25 RX ADMIN — ACETAMINOPHEN 650 MG: 325 TABLET ORAL at 13:39

## 2021-03-25 RX ADMIN — ACETAMINOPHEN 975 MG: 325 TABLET, FILM COATED ORAL at 02:05

## 2021-03-25 RX ADMIN — LEVETIRACETAM 750 MG: 750 TABLET, FILM COATED ORAL at 08:43

## 2021-03-25 RX ADMIN — OMEPRAZOLE 20 MG: 20 CAPSULE, DELAYED RELEASE ORAL at 20:58

## 2021-03-25 RX ADMIN — HEPARIN SODIUM 5000 UNITS: 5000 INJECTION, SOLUTION INTRAVENOUS; SUBCUTANEOUS at 13:39

## 2021-03-25 RX ADMIN — DEXAMETHASONE 2 MG: 2 TABLET ORAL at 13:40

## 2021-03-25 RX ADMIN — HEPARIN SODIUM 5000 UNITS: 5000 INJECTION, SOLUTION INTRAVENOUS; SUBCUTANEOUS at 05:04

## 2021-03-25 RX ADMIN — HYDROXYZINE HYDROCHLORIDE 10 MG: 10 TABLET, FILM COATED ORAL at 22:50

## 2021-03-25 RX ADMIN — OMEPRAZOLE 20 MG: 20 CAPSULE, DELAYED RELEASE ORAL at 08:43

## 2021-03-25 RX ADMIN — DEXAMETHASONE 2 MG: 2 TABLET ORAL at 06:36

## 2021-03-25 RX ADMIN — LEVETIRACETAM 750 MG: 750 TABLET, FILM COATED ORAL at 20:57

## 2021-03-25 RX ADMIN — SODIUM PHOSPHATE, MONOBASIC, MONOHYDRATE 9 MMOL: 276; 142 INJECTION, SOLUTION INTRAVENOUS at 06:36

## 2021-03-25 RX ADMIN — LABETALOL HYDROCHLORIDE 10 MG: 5 INJECTION, SOLUTION INTRAVENOUS at 06:54

## 2021-03-25 RX ADMIN — ACETAMINOPHEN 975 MG: 325 TABLET, FILM COATED ORAL at 10:33

## 2021-03-25 RX ADMIN — DEXAMETHASONE 2 MG: 2 TABLET ORAL at 22:44

## 2021-03-25 ASSESSMENT — VISUAL ACUITY
OU: BLURRED VISION;DOUBLE VISION/DIPLOPIA

## 2021-03-25 ASSESSMENT — MIFFLIN-ST. JEOR: SCORE: 1893.38

## 2021-03-25 ASSESSMENT — ACTIVITIES OF DAILY LIVING (ADL)
ADLS_ACUITY_SCORE: 18
ADLS_ACUITY_SCORE: 18
ADLS_ACUITY_SCORE: 19
ADLS_ACUITY_SCORE: 15
ADLS_ACUITY_SCORE: 15
ADLS_ACUITY_SCORE: 18

## 2021-03-25 NOTE — PROGRESS NOTES
"4A PT Eval     03/25/21 1100   Quick Adds   Type of Visit Initial PT Evaluation   Living Environment   People in home significant other  (girlfriend)   Current Living Arrangements mobile home   Home Accessibility stairs to enter home   Number of Stairs, Main Entrance 4   Stair Railings, Main Entrance none   Transportation Anticipated car, drives self;family or friend will provide   Living Environment Comments Patient reports living \"off the grid\" in mobile home with girlfriend on 40 acre property. Brother lives on other corner of property in house.   Self-Care   Usual Activity Tolerance excellent   Current Activity Tolerance moderate   Regular Exercise Yes   Equipment Currently Used at Home none   Activity/Exercise/Self-Care Comment Patient IND at baseline and very active doing chores on property (chopping wood, etc)   Disability/Function   Hearing Difficulty or Deaf no   Wear Glasses or Blind yes   Vision Management impaired vision in L eye at baseline, reports \"cresent moon\" vision, R eye currently swollen closed after surgery   Concentrating, Remembering or Making Decisions Difficulty no   Difficulty Communicating no   Difficulty Eating/Swallowing no   Walking or Climbing Stairs Difficulty no   Dressing/Bathing Difficulty no   Toileting issues no   Doing Errands Independently Difficulty (such as shopping) no   Fall history within last six months no   Change in Functional Status Since Onset of Current Illness/Injury yes   General Information   Onset of Illness/Injury or Date of Surgery 03/23/21   Referring Physician Fiona Moore MD   Patient/Family Therapy Goals Statement (PT) To return home   Pertinent History of Current Problem (include personal factors and/or comorbidities that impact the POC) Per EMR \" Vincenzo Avery is a 46 year old male with history of pituitary macroadenoma s/p endonasal endoscopic partial resection in 6/2018 by Elena Neely and Christal who underwent elective right craniotomy for " "open resection on 3/22 with Elena Neely and Sudhir.\"   Existing Precautions/Restrictions other (see comments)  (crani)   General Observations activity: up with assist   Cognition   Cognitive Status Comments grossly WFL at this time   Pain Assessment   Patient Currently in Pain No   Integumentary/Edema   Integumentary/Edema Comments surgical site swollen   Posture    Posture Forward head position   Range of Motion (ROM)   ROM Quick Adds ROM WFL   Strength Comprehensive (MMT)   General Manual Muscle Testing (MMT) Assessment no strength deficits identified   Strength   Manual Muscle Testing Quick Adds Strength WFL   Bed Mobility   Bed Mobility supine-sit   Supine-Sit [place] (Bed Mobility) independent   Transfers   Transfers sit-stand transfer   Sit-Stand Transfer   Sit-Stand [place] (Transfers) independent   Gait/Stairs (Locomotion)   [place] Level (Gait) contact guard   Comment (Gait/Stairs) single UE on IV pole for balance, CGA for support and for visual assist   Balance   Balance Comments mod I in static stance   Sensory Examination   Sensory Perception patient reports no sensory changes   Sensory Perception Comments grossly intact to LT   Muscle Tone   Muscle Tone no deficits were identified   Clinical Impression   Criteria for Skilled Therapeutic Intervention yes, treatment indicated   PT Diagnosis (PT) impaired functional mobility   Influenced by the following impairments decreased activity tolerance, impaired functional strength   Functional limitations due to impairments transfers, gait, stairs   Clinical Presentation Stable/Uncomplicated   Clinical Presentation Rationale clinical judgement   Clinical Decision Making (Complexity) low complexity   Therapy Frequency (PT) 3x/week   Predicted Duration of Therapy Intervention (days/wks) 2 weeks   Planned Therapy Interventions (PT) balance training;bed mobility training;gait training;stair training;strengthening;stretching;transfer training "   Risk & Benefits of therapy have been explained evaluation/treatment results reviewed;care plan/treatment goals reviewed;current/potential barriers reviewed;participants voiced agreement with care plan;participants included;patient   PT Discharge Planning    PT Discharge Recommendation (DC Rec) home   PT Rationale for DC Rec While patient currently presents below functional baseline anticipate patient will be appropriate to discharge home when medically stable. Will continue to follow and update as appropriate.    PT Brief overview of current status  CGA for ambulation with single UE support on IV pole   Total Evaluation Time   Total Evaluation Time (Minutes) 8       Joe Vee, PT, DPT  Pager #[phone]

## 2021-03-25 NOTE — PROGRESS NOTES
Transferred to: Unit 6A at 1245  Belongings: flowers and stuffed animal  Smart removed? Yes  Central line removed? Yes  Chart and medications sent with patient Yes  Family notified: Yes    Report given to RN on 6A all questions answered and all belongings with pt. Significant other (Gay) at bedside during transfer.

## 2021-03-25 NOTE — TELEPHONE ENCOUNTER
----- Message from Albert Moncada MD sent at 3/25/2021  2:26 PM CDT -----  Regarding: RE: postoperative 3rd n palsy  Hi Adonis,    Thanks for reaching out.  I'm happy to see this patient. It looks like you are seeing him on 4/6/21 per [facility].  I'll ask my assistant Rosemary to call the patient to set him up to see me at the pediatric eye clinic that day.    Thank you.   Alebrt   ----- Message -----  From: Rolf Neely MD  Sent: 3/23/2021   6:53 PM CDT  To: Alina Negron RN, Albert Moncada MD, #  Subject: postoperative 3rd n palsy                        Waldo Albert,    This is the patient who underwent craniotomy yesterday for recurrent pituitary adenoma and right 3rd n palsy, complete. Anticipate he will be in-house for a few days. Maybe we can coordinate his visit with you when he comes down for his routine wound check/first post op visit?     Thanks in advance,    Adonis

## 2021-03-25 NOTE — PLAN OF CARE
Major Shift Events: Q2h neuro status unchanged overnight. Oriented x4. Hard to complete full neuro exam when pt has been sleeping. Doesn't wake up and participate well. Pain control inadequate beginning of night. Dilaudid and Depakote given with relief. Systolic BP <160 without intervention. Room air.  Decreased appetite and needs reminders to drink water. Smart pulled. Due to void. Large, loose BM beginning of night. Phosphorus being replaced.    Plan: Continue with current POC.    For vital signs and complete assessments, please see documentation flowsheets.

## 2021-03-25 NOTE — PROGRESS NOTES
[facility], [place]   Neurosurgery Progress Note:    Assessment: Vincenzo Avery is a 46 year old man with history of pituitary macroadenoma s/p endonasal endoscopic partial resection in 6/2018 by Elena Neely and Christal who underwent elective right craniotomy for open resection on 3/22.    Plan:  - Serial neuro exams  - Pain control  - SBP <160  - 5-day dexamethasone taper   - Recheck AM cortisol 2 days after taper complete  - 7-day Keppra seizure prophylaxis  - HOB > 30 degrees  - Advance diet as tolerated  - Bowel regimen  - PRN antiemetics  - PT/OT  - SCDs for DVT proph; [facility]    Greatly appreciate the help from PT/OT in caring for Vincenzo Avery    -----------------------------------  Tonny Perry MD  Neurosurgery PGY-2    Interval History/Subjective: Impulsivity and anxiety improving; mental status clearing, now AO x 2-3. Got out of bed without alerting nursing overnight and complaining of headache, improved after getting back to bed. Valproic acid given for headache.    Objective:   Temp:  [97.7  F (36.5  C)-99.5  F (37.5  C)] 98.7  F (37.1  C)  Pulse:  [56-97] 66  Resp:  [8-20] 8  MAP:  [82 mmHg-172 mmHg] 101 mmHg  Arterial Line BP: (111-207)/() 139/78  SpO2:  [91 %-96 %] 96 %  I/O last 3 completed shifts:  In: 1488.67 [P.O.:540; I.V.:948.67]  Out: 2330 [Urine:2330]    Gen: Anxious, impulsive  Wound: covered with clean Primapore dressing; closed with nylon suture  Neurologic:  - Alert & Oriented to person only; not oriented to place or year  - Follows simple commands intermittently  - Speech fluent, spontaneous  - No gaze preference. No apparent hemineglect.  - Right CN3 palsy with ptosis, fixed mydriasis, and restricted medial and upward gaze; left eye full EOM with temporal field deficit  - Facial movements symmetric except above right ptosis  - Not participating in pronator drift testing     Del Tr Bi WE WF Gr   R 5 5 5 5 5 5   L 5 5 5 5 5 5    HF KE KF DF PF EHL   R 5  5 5 5 5 5   L 5 5 5 5 5 5     Reflexes symmetric; no hyperreflexia    Sensation intact and symmetric to light touch throughout    LABS  Recent Labs   Lab Test 03/24/21 0325 03/23/21 0302 03/22/21 1752 06/30/18  0646 06/30/18  0646   WBC 12.2* 12.5*  --   --  10.4   HGB 9.4* 10.0* 11.2*   < > 7.6*   MCV 92 91  --   --  89    189  --   --  163    < > = values in this interval not displayed.       Recent Labs   Lab Test 03/24/21 0325 03/23/21 2014 03/23/21  1022 03/23/21 0302 03/22/21 1752 03/22/21  0601 03/22/21  0601 09/17/19  1331    143 145* 142 141   < >  --  137   POTASSIUM 3.5  --   --  4.2 4.5   < > 4.0 3.9   CHLORIDE 116*  --   --  112*  --   --   --  109   CO2 24  --   --  26  --   --   --  24   BUN 16  --   --  14  --   --   --  20   CR 0.75  --   --  0.88  --   --  1.03 0.82   ANIONGAP 5  --   --  4  --   --   --  4   KANA 7.4*  --   --  7.8*  --   --   --  9.2   *  --   --  129* 155*   < >  --  92    < > = values in this interval not displayed.       IMAGING  Recent Results (from the past 24 hour(s))   XR Abdomen Port 1 View    Narrative    Exam: XR ABDOMEN PORT 1 VW, 3/24/2021 10:13 AM    Indication: [place] and constipation    Comparison: None    Findings:   Smart catheter in place. Nonobstructive bowel gas pattern. No  significant stool burden. No free air or pneumatosis on this supine  film.      Impression    Impression: Nonobstructive bowel gas pattern.    GUALBERTO MILLER MD     I have reviewed the history. I have seen and examined the patient myself and agree with the assessment and plan above.  ODALYS Neely MD

## 2021-03-25 NOTE — PLAN OF CARE
Status: Pt arrived from  at approximately 1330. Hx of pituitary macroadenoma s/p craniotomy 03/22. POD#2.   VS: VSS except slightly hypertensive within parameters.   Neuros: A/OX4. Right ptosis, bruising, difficulty opening eye. Blurry vision in R eye. L eye with impaired vision at baseline, L field cut.   GI: Regular diet, tolerating it well.   : Voiding spontaneously per report.   Respiratory: WDL, on RA.   Skin: Cranial incision WDL, bruising on face and R eye.   IV: PIV 50 NS/hr.    Activity: SBA, GB due to eyesight impairment.   Pain: Incisional pain controlled with Tylenol PRN.   Plan of care:  Continue to monitor and follow POC.

## 2021-03-25 NOTE — TELEPHONE ENCOUNTER
Called patient phone and his gf/fiance answered the phone. With patient next to her, we were able to schedule patient for 4/6/21 at 8AM.    ZONIA MENDOZA COT on 3/25/2021 at 3:31 PM

## 2021-03-25 NOTE — PROGRESS NOTES
CLINICAL NUTRITION SERVICES - ASSESSMENT NOTE     Nutrition Prescription    RECOMMENDATIONS FOR MDs/PROVIDERS TO ORDER:  - Ongoing bowel regimen -- more aggressive as indicated   - Pending adequacy of PO, may need to consider nutrition support to meet nutrition needs. Ramo counts pending.     Malnutrition Status:    - Unable to determine due to unable to assess all parameters of NFPA    Recommendations already ordered by Registered Dietitian (RD):  - Continue supplements as ordered: Boost/Ensure  - Ordered ramo counts to help assess PO and need for further nutrition intervention      Future/Additional Recommendations:  - PO/supp adequacy (ramo counts ordered 3/25) vs need for FT/TFs    - If EN indicated, once enteral access placed and verified for use, rec: Pivot 1.5 Ramo @ goal of 60ml/hr  (1440ml/day) will provide: 2160 kcals (27 kcals/kg), 135 g PRO (1.7 gm/kg), 1080 ml free H20, 248 g CHO, and 10 g fiber daily.  - Initiate @ 10 ml/hr and adv by 10 ml q4hr as tolerated  - Do not start or advance until lytes (Mg++,K+) WNL and phos>2.0   - Recommend 30-60 ml q4hr fluid flushes for tube patency. Additional fluids and/or adjustments per MD.    - Order multivitamin/mineral (15 ml/day via FT) to help ensure micronutrient needs being met with suspected hypermetabolic demands and potential interruptions to TF infusions.  - Elevated HOB if gastric feeds        REASON FOR ASSESSMENT  Vincenzo Avery is a/an 46 year old male assessed by the dietitian for Nutrition Risk Monitoring - assessed for poor PO/appetite in ICU.     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    NUTRITION HISTORY  Constipation     CURRENT NUTRITION ORDERS  Diet: Regular; ordered oral supplements 3/24  Intake/Tolerance: Poor appetite per rounds and RN 3/24 and per chart notes.     LABS  Labs  "reviewed  M.6 (H)  Phos trends: 1.8 (L), 2.5   Glucose trends: 116, 110    MEDICATIONS  Medications reviewed  Dulcolax  Miralax  Senokot   Phos replacement   IVF @ 50 ml/hr     ANTHROPOMETRICS  Height: 175.3 cm (5' 9\") 69\"   Most Recent Weight: 102.8 kg (226 lb 10.1 oz)    IBW: 73 kg  BMI: Obesity Grade I BMI 30-34.9  Weight History:   Wt Readings from Last 8 Encounters:   21 102.8 kg (226 lb 10.1 oz)   18 97.8 kg (215 lb 11.2 oz)   18 91.2 kg (201 lb)   18 92.9 kg (204 lb 12.9 oz)   18 92.6 kg (204 lb 3.2 oz)   18 92.1 kg (203 lb)   18 96.3 kg (212 lb 6.4 oz)     Dosing Weight: 81 kg (adjusted based on current weight of 103 kg and IBW of 73 kg)     ASSESSED NUTRITION NEEDS  Estimated Energy Needs: 3609-0050 kcals/day (25 - 30 kcals/kg)  Justification: Maintenance  Estimated Protein Needs: 122-162 grams protein/day (1.5 - 2 grams of pro/kg)  Justification: Increased needs  Estimated Fluid Needs: 5557-3661 mL/day (1 mL/kcal)   Justification: Maintenance and Per provider pending fluid status    PHYSICAL FINDINGS  See malnutrition section below.  From chart 3/24  Abd: non-distended  Ext: no edema  Skin: no rashes or lesions noted on visualized skin    MALNUTRITION  % Intake: Decreased intake does not meet criteria - follow for longer duration of decreased PO (admit only 3 days); unable to assess nutrition h/o PTA  % Weight Loss: None noted  Subcutaneous Fat Loss: Unable to assess  Muscle Loss: Unable to assess  Fluid Accumulation/Edema: None noted  Malnutrition Diagnosis: Unable to determine due to unable to assess all parameters of NFPA    NUTRITION DIAGNOSIS  Inadequate oral intake related to decreased appetite, neuro status as evidenced by decreased PO at least since admit/x 3 days.      INTERVENTIONS  Implementation  Nutrition Education: Will be provided if education needs arise   Enteral Nutrition - recs made if needed  Medical food supplement therapy - " continue  Composition of meals/snacks - ubaldo counts     Goals  Patient to consume % of nutritionally adequate meal trays TID, or the equivalent with supplements/snacks.     Monitoring/Evaluation  Progress toward goals will be monitored and evaluated per protocol.     Addy Candelaria RD, NOA, [facility]  Neuro ICU  Pager: [phone]

## 2021-03-26 ENCOUNTER — APPOINTMENT (OUTPATIENT)
Dept: PHYSICAL THERAPY | Facility: CLINIC | Age: 46
End: 2021-03-26
Attending: NEUROLOGICAL SURGERY
Payer: COMMERCIAL

## 2021-03-26 ENCOUNTER — APPOINTMENT (OUTPATIENT)
Dept: OCCUPATIONAL THERAPY | Facility: CLINIC | Age: 46
End: 2021-03-26
Attending: NEUROLOGICAL SURGERY
Payer: COMMERCIAL

## 2021-03-26 VITALS
DIASTOLIC BLOOD PRESSURE: 77 MMHG | TEMPERATURE: 97.8 F | BODY MASS INDEX: 33.57 KG/M2 | RESPIRATION RATE: 18 BRPM | HEART RATE: 87 BPM | HEIGHT: 69 IN | WEIGHT: 226.63 LBS | SYSTOLIC BLOOD PRESSURE: 124 MMHG | OXYGEN SATURATION: 97 %

## 2021-03-26 LAB
ANION GAP SERPL CALCULATED.3IONS-SCNC: 7 MMOL/L (ref 3–14)
BUN SERPL-MCNC: 26 MG/DL (ref 7–30)
CALCIUM SERPL-MCNC: 8.6 MG/DL (ref 8.5–10.1)
CHLORIDE SERPL-SCNC: 111 MMOL/L (ref 94–109)
CO2 SERPL-SCNC: 23 MMOL/L (ref 20–32)
COPATH REPORT: NORMAL
CREAT SERPL-MCNC: 0.81 MG/DL (ref 0.66–1.25)
ERYTHROCYTE [DISTWIDTH] IN BLOOD BY AUTOMATED COUNT: 13.5 % (ref 10–15)
GFR SERPL CREATININE-BSD FRML MDRD: >90 ML/MIN/{1.73_M2}
GLUCOSE SERPL-MCNC: 104 MG/DL (ref 70–99)
HCT VFR BLD AUTO: 29.4 % (ref 40–53)
HGB BLD-MCNC: 9.6 G/DL (ref 13.3–17.7)
MAGNESIUM SERPL-MCNC: 2.4 MG/DL (ref 1.6–2.3)
MCH RBC QN AUTO: 30.7 PG (ref 26.5–33)
MCHC RBC AUTO-ENTMCNC: 32.7 G/DL (ref 31.5–36.5)
MCV RBC AUTO: 94 FL (ref 78–100)
PHOSPHATE SERPL-MCNC: 2.6 MG/DL (ref 2.5–4.5)
PLATELET # BLD AUTO: 170 10E9/L (ref 150–450)
POTASSIUM SERPL-SCNC: 4.1 MMOL/L (ref 3.4–5.3)
RBC # BLD AUTO: 3.13 10E12/L (ref 4.4–5.9)
SODIUM SERPL-SCNC: 141 MMOL/L (ref 133–144)
WBC # BLD AUTO: 8.2 10E9/L (ref 4–11)

## 2021-03-26 PROCEDURE — 83735 ASSAY OF MAGNESIUM: CPT | Performed by: STUDENT IN AN ORGANIZED HEALTH CARE EDUCATION/TRAINING PROGRAM

## 2021-03-26 PROCEDURE — 250N000013 HC RX MED GY IP 250 OP 250 PS 637: Performed by: STUDENT IN AN ORGANIZED HEALTH CARE EDUCATION/TRAINING PROGRAM

## 2021-03-26 PROCEDURE — 97110 THERAPEUTIC EXERCISES: CPT | Mod: GP

## 2021-03-26 PROCEDURE — 250N000012 HC RX MED GY IP 250 OP 636 PS 637: Performed by: STUDENT IN AN ORGANIZED HEALTH CARE EDUCATION/TRAINING PROGRAM

## 2021-03-26 PROCEDURE — 85027 COMPLETE CBC AUTOMATED: CPT | Performed by: STUDENT IN AN ORGANIZED HEALTH CARE EDUCATION/TRAINING PROGRAM

## 2021-03-26 PROCEDURE — 258N000003 HC RX IP 258 OP 636: Performed by: STUDENT IN AN ORGANIZED HEALTH CARE EDUCATION/TRAINING PROGRAM

## 2021-03-26 PROCEDURE — 84100 ASSAY OF PHOSPHORUS: CPT | Performed by: STUDENT IN AN ORGANIZED HEALTH CARE EDUCATION/TRAINING PROGRAM

## 2021-03-26 PROCEDURE — 97530 THERAPEUTIC ACTIVITIES: CPT | Mod: GO | Performed by: OCCUPATIONAL THERAPIST

## 2021-03-26 PROCEDURE — 80048 BASIC METABOLIC PNL TOTAL CA: CPT | Performed by: STUDENT IN AN ORGANIZED HEALTH CARE EDUCATION/TRAINING PROGRAM

## 2021-03-26 PROCEDURE — 250N000011 HC RX IP 250 OP 636: Performed by: STUDENT IN AN ORGANIZED HEALTH CARE EDUCATION/TRAINING PROGRAM

## 2021-03-26 PROCEDURE — 97535 SELF CARE MNGMENT TRAINING: CPT | Mod: GO | Performed by: OCCUPATIONAL THERAPIST

## 2021-03-26 PROCEDURE — 36415 COLL VENOUS BLD VENIPUNCTURE: CPT | Performed by: STUDENT IN AN ORGANIZED HEALTH CARE EDUCATION/TRAINING PROGRAM

## 2021-03-26 RX ORDER — ONDANSETRON 4 MG/1
4-8 TABLET, ORALLY DISINTEGRATING ORAL EVERY 6 HOURS PRN
Qty: 30 TABLET | Refills: 1 | Status: SHIPPED | OUTPATIENT
Start: 2021-03-26

## 2021-03-26 RX ORDER — HYDROXYZINE HYDROCHLORIDE 10 MG/1
10 TABLET, FILM COATED ORAL 3 TIMES DAILY PRN
Qty: 30 TABLET | Refills: 1 | Status: SHIPPED | OUTPATIENT
Start: 2021-03-26

## 2021-03-26 RX ORDER — PROCHLORPERAZINE MALEATE 10 MG
10 TABLET ORAL ONCE
Status: COMPLETED | OUTPATIENT
Start: 2021-03-26 | End: 2021-03-26

## 2021-03-26 RX ORDER — LEVETIRACETAM 750 MG/1
750 TABLET ORAL 2 TIMES DAILY
Qty: 8 TABLET | Refills: 0 | Status: SHIPPED | OUTPATIENT
Start: 2021-03-26

## 2021-03-26 RX ORDER — AMOXICILLIN 250 MG
2 CAPSULE ORAL 2 TIMES DAILY PRN
Qty: 60 TABLET | Refills: 1 | Status: SHIPPED | OUTPATIENT
Start: 2021-03-26

## 2021-03-26 RX ORDER — CAFFEINE 200 MG
200 TABLET ORAL DAILY
Status: DISCONTINUED | OUTPATIENT
Start: 2021-03-26 | End: 2021-03-26 | Stop reason: HOSPADM

## 2021-03-26 RX ORDER — MAGNESIUM SULFATE HEPTAHYDRATE 40 MG/ML
2 INJECTION, SOLUTION INTRAVENOUS ONCE
Status: COMPLETED | OUTPATIENT
Start: 2021-03-26 | End: 2021-03-26

## 2021-03-26 RX ORDER — DEXAMETHASONE 1 MG
1 TABLET ORAL EVERY 8 HOURS
Qty: 4 TABLET | Refills: 0 | Status: ON HOLD | OUTPATIENT
Start: 2021-03-26 | End: 2022-06-04

## 2021-03-26 RX ADMIN — PROCHLORPERAZINE MALEATE 10 MG: 10 TABLET ORAL at 09:47

## 2021-03-26 RX ADMIN — DEXAMETHASONE 1 MG: 1 TABLET ORAL at 18:45

## 2021-03-26 RX ADMIN — DEXAMETHASONE 1 MG: 1 TABLET ORAL at 07:08

## 2021-03-26 RX ADMIN — LEVETIRACETAM 750 MG: 750 TABLET, FILM COATED ORAL at 08:40

## 2021-03-26 RX ADMIN — HEPARIN SODIUM 5000 UNITS: 5000 INJECTION, SOLUTION INTRAVENOUS; SUBCUTANEOUS at 13:46

## 2021-03-26 RX ADMIN — SODIUM CHLORIDE: 9 INJECTION, SOLUTION INTRAVENOUS at 04:29

## 2021-03-26 RX ADMIN — ACETAMINOPHEN 650 MG: 325 TABLET ORAL at 08:43

## 2021-03-26 RX ADMIN — ACETAMINOPHEN 650 MG: 325 TABLET ORAL at 13:46

## 2021-03-26 RX ADMIN — HYDROXYZINE HYDROCHLORIDE 10 MG: 10 TABLET, FILM COATED ORAL at 15:49

## 2021-03-26 RX ADMIN — LEVETIRACETAM 750 MG: 750 TABLET, FILM COATED ORAL at 18:45

## 2021-03-26 RX ADMIN — HEPARIN SODIUM 5000 UNITS: 5000 INJECTION, SOLUTION INTRAVENOUS; SUBCUTANEOUS at 07:08

## 2021-03-26 RX ADMIN — HYDROXYZINE HYDROCHLORIDE 10 MG: 10 TABLET, FILM COATED ORAL at 08:43

## 2021-03-26 RX ADMIN — OMEPRAZOLE 20 MG: 20 CAPSULE, DELAYED RELEASE ORAL at 08:39

## 2021-03-26 RX ADMIN — DEXAMETHASONE 1 MG: 1 TABLET ORAL at 13:46

## 2021-03-26 RX ADMIN — MAGNESIUM SULFATE IN WATER 2 G: 40 INJECTION, SOLUTION INTRAVENOUS at 09:47

## 2021-03-26 RX ADMIN — CAFFEINE 200 MG: 200 TABLET ORAL at 14:39

## 2021-03-26 RX ADMIN — OMEPRAZOLE 20 MG: 20 CAPSULE, DELAYED RELEASE ORAL at 18:45

## 2021-03-26 ASSESSMENT — ACTIVITIES OF DAILY LIVING (ADL)
ADLS_ACUITY_SCORE: 15

## 2021-03-26 NOTE — DISCHARGE SUMMARY
[facility] Discharge Summary and Instructions    Vincenzo Avery MRN# [id number]   Age: 46 year old YOB: 1975     Date of Admission:  3/22/2021  Date of Discharge::  3/26/2021  Admitting Physician:  Rolf Neely MD  Discharge Physician:  Rolf Neely MD          Admission Diagnoses:   Pituitary macroadenoma (H) [D35.2]          Discharge Diagnosis:   Pituitary macroadenoma (H) [D35.2]          Procedures:   3/22/2021: Stealth Assisted Right Frontotemporal Craniotomy for Tumor Resection with Lumbar Drain Placement            Brief History of Illness:   Vincenzo Avery is a very pleasant 46 year old male whose past medical history includes Obstructive Sleep Apnea, Migraine Headaches and known Pituitary Mass. The patient had a telephone visit with Dr. Adonis Neely on 3/16/2021 regarding his recurrent pituitary macroadenoma. The patient underwent a subtotal endoscopic endonasal approach for tumor resection in the past. The right parasellar component of the tumor that was not resected prior, has grown substantially and also includes an intradural component. The patient has been experiencing fatigue, bitemporal headaches and bilateral tinnitus. Surgical resection of the tumor was recommended through a Right Frontotemporal Craniotomy. After the risks and benefits of the operation had been discussed with the patient, the patient provided consent to proceed with the operative intervention.            Hospital Course:   Mr. Avery was admitted to the hospital and underwent the above named operation by Dr. Adonis Neely. Noted complications following the operation include injury to Right Cranial Nerve 3. Following the operation, the patient was transferred to Unit 4A, for close neurological monitoring. The patient was initiated on a 5 day taper of Dexamethasone for treatment of Vasogenic Edema. In addition, the patient was initiated on a 7 day course of Keppra 750 mg BID for  "seizure prophylaxis. On 3/23/2021, the patient's lumbar drain was removed after no longer deemed necessary. An MRI Brain with and without contrast was also obtained on that day which demonstrated vasogenic edema, mass effect and midline shift. The study also demonstrated subtotal resection of the mass. On 3/24/2021, the patient had a migraine headache which improved following administration of migraine-specific pain medications. On 3/25/2021, the patient was deemed medically and neurologically stable to transfer to Unit 6A, Neurosciences, for the remainder of his hospitalization. The patient was evaluated our inpatient rehabilitation therapists and was deemed safe to discharge home with outpatient Occupational Therapy for vision therapy. On 3/26/2021, the patient had met all of his discharge goals in that he was tolerating a regular diet, passing flatus, voiding, pain was well-controlled with oral analgesics, and he had been deemed safe to discharge home by PT/OT therapists. The patient was therefore discharged home with his significant other.     Examination:   /77 (BP Location: Left arm)   Pulse 87   Temp 97.8  F (36.6  C) (Oral)   Resp 18   Ht 1.753 m (5' 9\")   Wt 102.8 kg (226 lb 10.1 oz)   SpO2 97%   BMI 33.47 kg/m    Gen: In no acute distress  Wound: clean, dry, intact; closed with nylon suture  Neurologic:  - Alert & Oriented to person only; not oriented to place or year  - Briskly ollows commands   - Speech fluent, spontaneous  - No gaze preference. No apparent hemineglect.  - Right CN3 palsy with ptosis, fixed mydriasis, and restricted medial and upward gaze; left eye full EOM with temporal field deficit  - Facial movements symmetric except above right ptosis  - No pronator drift       Del Tr Bi WE WF Gr   R 5 5 5 5 5 5   L 5 5 5 5 5 5     HF KE KF DF PF EHL   R 5 5 5 5 5 5   L 5 5 5 5 5 5     Reflexes symmetric; no hyperreflexia  Sensation intact and symmetric to light touch throughout         " Discharge Medications:     Current Discharge Medication List      START taking these medications    Details   dexamethasone (DECADRON) 1 MG tablet Take 1 tablet (1 mg) by mouth every 8 hours  Qty: 4 tablet, Refills: 0    Comments: Indication: Brain Edema  Please take 1 mg every 8 hours for 1 dose on 3/26.  Then, take 1 mg every 8 hours for 3 doses on 3/27. Then stop.  Associated Diagnoses: Pituitary macroadenoma (H)      hydrOXYzine (ATARAX) 10 MG tablet Take 1 tablet (10 mg) by mouth 3 times daily as needed for anxiety  Qty: 30 tablet, Refills: 1    Associated Diagnoses: Pituitary macroadenoma (H)      levETIRAcetam (KEPPRA) 750 MG tablet Take 1 tablet (750 mg) by mouth 2 times daily  Qty: 8 tablet, Refills: 0    Comments: Indication: Prevention of Seizures Post-Operative  Take 1 Tablet 2 times daily through 3/30/2021, then stop.  Associated Diagnoses: Pituitary macroadenoma (H)      ondansetron (ZOFRAN-ODT) 4 MG ODT tab Take 1-2 tablets (4-8 mg) by mouth every 6 hours as needed for nausea or vomiting  Qty: 30 tablet, Refills: 1    Comments: Indication: Nausea and Vomiting  Associated Diagnoses: Pituitary macroadenoma (H)      senna-docusate (SENOKOT-S/PERICOLACE) 8.6-50 MG tablet Take 2 tablets by mouth 2 times daily as needed for constipation  Qty: 60 tablet, Refills: 1    Comments: Indication: Prevention of Constipation with Concurrent use of Opioid Analgesics  Associated Diagnoses: Pituitary macroadenoma (H)                     Discharge Instructions and Follow-Up:   Discharge diet: Regular Diet with No Restrictions     Discharge activity: You may advance activity as tolerated. No strenuous exercise or heay lifting greater than 10 lbs for 4 weeks or until seen and cleared in clinic.    Please DO NOT drive until seen in the Ophthalmology Clinic.     Begin Outpatient Occupational Therapy for Vision Therapy.      Discharge follow-up: Please follow-up with Albert Moncada in the Ophthalmology Clinic on 4-6-2021  at 8AM.     Please follow-up with SCHUYLER Michelle in the Neurosurgery Clinic on 4-6-2021 at 10:45 AM.     Please follow-up with Dr. Adonis Neely in 3 months with a repeat MRI Brain with and without Contrast (Sellar Protocol).      Wound care: Ok to shower,however no scrubbing of the wound and no soaking of the wound, meaning no bathtubs or swimming pools. Pat dry only. Leave wound open to air.       Please call if you have:  1. increased pain, redness, drainage, swelling at your incision  2. fevers > 101.5 F degrees  3. with any questions or concerns.  You may reach the Neurosurgery clinic at [phone] during regular work hours. ER at [phone].    and ask for the Neurosurgery Resident on call at [phone], during off hours or weekends.         Discharge Disposition:   Discharged to home with assistance from significant other        Jyoti Ramsey, MSN, ACNP-BC, CNRN  Department of Neurosurgery   Pager: [phone]

## 2021-03-26 NOTE — PLAN OF CARE
Status: s/p craniotomy for pituitary macroadenoma resection on 03/22.   VS: VSS except slightly hypertensive within parameters.   Neuros: A&Ox4. Right ptosis, bruising, difficulty opening eye. Blurry vision in R eye. L eye with impaired vision at baseline, L field cut. Right pupil 6 and fixed, left 4 and brisk.   GI: Regular diet  : Voiding spontaneously per report.   Respiratory: WDL, on RA.   Skin: Cranial incision WDL, bruising on face and R eye.   IV: PIV 50 NS/hr.    Activity: SBA, GB due to eyesight impairment.   Pain: Incisional pain controlled with Tylenol and Atarax PRN. Ice packs to right eye prn  Plan of care:  Continue to monitor and follow POC.   Updates this shift: No changes overnight

## 2021-03-26 NOTE — PLAN OF CARE
Status: Pt arrived from  at approximately 1330. Hx of pituitary macroadenoma s/p craniotomy 03/22. POD#2.   VS: VSS except slightly hypertensive within parameters.   Neuros: A/OX4. Right ptosis, bruising, difficulty opening eye. Blurry vision in R eye. L eye with impaired vision at baseline, L field cut. Right pupil 6 and fixed, left 4 and brisk.  GI: Regular diet, tolerating it well.   : Voiding spontaneously per report.   Respiratory: WDL, on RA.   Skin: Cranial incision WDL, bruising on face and R eye.   IV: PIV 50 NS/hr.    Activity: SBA, GB due to eyesight impairment.   Pain: Incisional pain controlled with Tylenol PRN. Ice packs to right eye prn  Plan of care:  Continue to monitor and follow POC.

## 2021-03-26 NOTE — PLAN OF CARE
Physical Therapy Discharge Summary    Reason for therapy discharge:    All goals and outcomes met, no further needs identified.    Progress towards therapy goal(s). See goals on Care Plan in [facility] electronic health record for goal details.  Goals met    Therapy recommendation(s):    No further therapy is recommended.

## 2021-03-26 NOTE — PROGRESS NOTES
[facility], [place]   Neurosurgery Progress Note:    Assessment: Vincenzo Avery is a 46 year old man with history of pituitary macroadenoma s/p endonasal endoscopic partial resection in 6/2018 by Elena Neely and Christal who underwent elective right craniotomy for open resection on 3/22.    Plan:  - Serial neuro exams  - Pain control  - SBP <160  - 5-day dexamethasone taper   - Recheck AM cortisol 2 days after taper complete  - 7-day Keppra seizure prophylaxis  - HOB > 30 degrees  - Advance diet as tolerated  - Bowel regimen  - PRN antiemetics  - PT/OT  - SCDs for DVT proph; [facility]    Greatly appreciate the help from PT/OT in caring for Vincenzo Avery    -----------------------------------  Tonny Perry MD  Neurosurgery PGY-2    Interval History/Subjective: Feels well. Significantly more interactive and cooperative on exam.    Objective:   Temp:  [96.8  F (36  C)-98.8  F (37.1  C)] 98.8  F (37.1  C)  Pulse:  [54-84] 62  Resp:  [12-25] 16  BP: (117-140)/(71-84) 117/71  MAP:  [104 mmHg-125 mmHg] 106 mmHg  Arterial Line BP: (146-177)/(75-96) 155/85  SpO2:  [93 %-99 %] 98 %  I/O last 3 completed shifts:  In: 1825 [P.O.:1150; I.V.:675]  Out: 2675 [Urine:2675]    Gen: In no acute distress  Wound: clean, dry, intact; closed with nylon suture  Neurologic:  - Alert & Oriented to person only; not oriented to place or year  - Briskly ollows commands   - Speech fluent, spontaneous  - No gaze preference. No apparent hemineglect.  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 palsy with ptosis, fixed mydriasis, and restricted medial and upward gaze; left eye full EOM with temporal field deficit  - Facial movements symmetric except above right ptosis  - No pronator drift     Del Tr Bi WE WF Gr   R 5 5 5 5 5 5   L 5 5 5 5 5 5    HF KE KF DF PF EHL   R 5 5 5 5 5 5   L 5 5 5 5 5 5     Reflexes symmetric; no hyperreflexia    Sensation intact and symmetric to light touch throughout    LABS  Recent Labs   Lab Test 03/25/21  4126  03/24/21 0325 03/23/21 0302   WBC 10.1 12.2* 12.5*   HGB 9.0* 9.4* 10.0*   MCV 92 92 91    179 189       Recent Labs   Lab Test 03/25/21 0312 03/24/21 0325 03/23/21 2014 03/23/21 0302 03/23/21  0302    144 143   < > 142   POTASSIUM 3.9 3.5  --   --  4.2   CHLORIDE 109 116*  --   --  112*   CO2 26 24  --   --  26   BUN 22 16  --   --  14   CR 0.85 0.75  --   --  0.88   ANIONGAP 6 5  --   --  4   KANA 8.2* 7.4*  --   --  7.8*   * 116*  --   --  129*    < > = values in this interval not displayed.       IMAGING  No results found for this or any previous visit (from the past 24 hour(s)).    I have reviewed the history. I have seen and examined the patient myself and agree with the assessment and plan above.  ODALYS Neely MD

## 2021-03-27 ENCOUNTER — PATIENT OUTREACH (OUTPATIENT)
Dept: CARE COORDINATION | Facility: CLINIC | Age: 46
End: 2021-03-27

## 2021-03-27 NOTE — PLAN OF CARE
Status: s/p right craniotomy for open resection on 3/22   VS: VSS ex HTN w/in parameters  Neuros: A&Ox4. R pupil, 6mm, fixed; R pitosis. Blurry vision in R eye. L eye 4mm, brisk.  Baseline vision impairment in L eye; L field cut  GI: Tolerating regular diet, ubaldo counts day 1, fair intake; intake encouraged. Passing gas  : Voiding w/out difficulty  IV: PIV removed  Activity: SBA d/t vision impairments; Worked w/PT/OT, walked on unit x2  Pain: Significant HA and migraine pain managed fairly w/Tylenol, atarax, magnesium, Compazine, and caffeine   Skin: R head incision MOHAN, approximated w/sutures, R periorbital edema/bruising  Labs/Tests: WNL  Social: Girlfriend at bedside, supportive  Plan of care: Pt discharged to home; girlfriend providing ride home. Pt took paper scripts home d/t pharmacy being closed. Encouraged pt to fill scripts as soon as possible d/t having a steroid and the importance taking meds on time. Pt took decadron, keppra, and prilosec prior to leaving d/t not being able to take meds this evening. AVS reviewed w/pt and s/o w/no additional questions

## 2021-03-27 NOTE — PLAN OF CARE
Occupational Therapy Discharge Summary    Reason for therapy discharge:    Discharged to home.    Progress towards therapy goal(s). See goals on Care Plan in [facility] electronic health record for goal details.  Goals partially met.  Barriers to achieving goals:   discharge from facility.    Therapy recommendation(s):    Continued therapy is recommended.  Rationale/Recommendations:  24 hour assist due to vision changes. Would benefit from OP therapy to address vision. Pt was not seen by discharging therapist.

## 2021-03-29 ENCOUNTER — TELEPHONE (OUTPATIENT)
Dept: NEUROSURGERY | Facility: CLINIC | Age: 46
End: 2021-03-29

## 2021-03-29 NOTE — TELEPHONE ENCOUNTER
Message routed to Neurosurgery Nurse Pool. Message to Alina Negron, RNCC for   Dr. Neely.     Alesia Scales LPN  Neurosurgery

## 2021-03-29 NOTE — TELEPHONE ENCOUNTER
Health Call Center    Phone Message    May a detailed message be left on voicemail: yes     Reason for Call: Symptoms or Concerns     If patient has red-flag symptoms, warm transfer to triage line    Current symptom or concern: increased pain    Symptoms have been present for:  1 day(s)    Has patient previously been seen for this? Yes    By : Dr. Neely      Are there any new or worsening symptoms? Yes: Patients significant other calling stating patient is having increased pain on the right side of his head behind his eye and the prescribed medication is not helping with that. Please call to discuss thank you.       Action Taken: Message routed to:  [facility] neurosurgery    Travel Screening: Not Applicable

## 2021-03-29 NOTE — TELEPHONE ENCOUNTER
Spoke with Yasmine,  Patient states Tylenol XS 500mg 2 tablets every 4-6 hours as needed.    Todays medication  8am Tylenol 500mg 2 tabs  Noon Tylenol 500mg 2 tabs  2pm Tylenol 500mg 2 tabs  4pm Tylenol 500mg 1 tab.    Pain is around right eye, having pain in right eye area, that does not respond to the Tylenol.  Headaches on the right side.   Patient asking for pain medication for right eye area.  Decadron completed.Swelling improved over right eye.      Will refer to Dr CARRANZA and get back to Yasmine.

## 2021-03-30 ENCOUNTER — TELEPHONE (OUTPATIENT)
Dept: NEUROSURGERY | Facility: CLINIC | Age: 46
End: 2021-03-30

## 2021-03-30 DIAGNOSIS — Z98.890 S/P CRANIOTOMY: Primary | ICD-10-CM

## 2021-03-30 DIAGNOSIS — D35.2 PITUITARY MACROADENOMA (H): ICD-10-CM

## 2021-03-30 RX ORDER — HYDROCODONE BITARTRATE AND ACETAMINOPHEN 5; 325 MG/1; MG/1
1-2 TABLET ORAL EVERY 6 HOURS PRN
Qty: 40 TABLET | Refills: 0 | Status: SHIPPED | OUTPATIENT
Start: 2021-03-30 | End: 2021-04-08

## 2021-03-30 NOTE — TELEPHONE ENCOUNTER
Spoke with Chin Underwood to eprescribe pain med to [facility] pharmacy in chart.  Will contract Dr Neely again for medication.  Yasmine Chen has worked in the past.

## 2021-03-30 NOTE — TELEPHONE ENCOUNTER
"Spoke with Gay Underwood states patient is acting out today, yelling and trying to kick care giver stating \"they are not giving me my meds\".  Patient states head pain around eye and not getting any pain relief from Tylenol.  Dr Neely notified, will research and callback.    "

## 2021-03-30 NOTE — TELEPHONE ENCOUNTER
Call back to Yasmine, Left message, Dr Neely sent RX to [facility] in chart for pain medication.  Please start with 2 tablets as prescribed, then going down to 1 tablet every 6 hours as needed for pain.  Hydrocodone-Acedtaminophen 5-325mg 1-2 tablets every 6 hours prn pain.    Call back for questions/concerns  Alina  [phone]

## 2021-03-30 NOTE — TELEPHONE ENCOUNTER
[facility] Call Center    Phone Message    May a detailed message be left on voicemail: yes     Reason for Call: Other: Please have Dr. Neely's nurse call Gay to respond to her questions. Thank you.     Action Taken: Message routed to:  [facility]    Travel Screening: Not Applicable

## 2021-03-30 NOTE — TELEPHONE ENCOUNTER
Dr Neely will eprescribe medication.    Please call back if you do not pain medication not at pharmacy.    Left detailed voice mail for Yasmine

## 2021-03-30 NOTE — PROGRESS NOTES
The patient was contacted by another RN for post-hospital follow up. Will close encounter at this time.    Alisson Oakley CMA, [facility]  Post Hospital Discharge Team

## 2021-03-31 ENCOUNTER — TELEPHONE (OUTPATIENT)
Dept: NEUROSURGERY | Facility: CLINIC | Age: 46
End: 2021-03-31

## 2021-03-31 NOTE — TELEPHONE ENCOUNTER
Call to Yasmine,  Left message, I am hoping Vincenzo was able to get the pain medication sent in by Dr Neely on 3/30/21.  I also left a message yesterday late afternoon, medication was sent in to [facility] in [place] in his chart.   Asked Yasmine to return call for an update, making sure Vincenzo had pain relief, can work with other medications if needed.  Also Dr Neely said patient could take NSAIDS at this point.      Left detailed message to please return call to Alina  [phone]

## 2021-03-31 NOTE — TELEPHONE ENCOUNTER
Writer spoke with Yasmine. She confirmed the pt has received his pain meds and is feeling much better. I let her know that he can also take  NSAIDS at this point. She did not have any further questions or concerns.     Kassy Fitzpatrick

## 2021-03-31 NOTE — TELEPHONE ENCOUNTER
Outbound call to Yasmine.  Javid got pain medication and is helping immensely.    Patient slept last night and pain is under good control at this time.    Per Dr Neely said patient can alternate Ibuprofen and Tylenol every 3 hours for pain.    Yasmine voices understanding.  States patient is very active around the house today and in no pain using Lortab.  Explained need to change out when he can to alternating Ibuprofen 2 tabs with Tylenol 2 tabs every 3 hours.    Voices understanding.    Patient teach back method completed.

## 2021-04-01 ENCOUNTER — TELEPHONE (OUTPATIENT)
Dept: OPHTHALMOLOGY | Facility: CLINIC | Age: 46
End: 2021-04-01

## 2021-04-01 NOTE — TELEPHONE ENCOUNTER
Mary, one of the [facility] orthoptist reached to me in regards to patient's appointment. Patient originally had an appointment with Dr. Moncada at 8AM on 4/6/21.  However patient's appointment was rescheduled to 4/7/21 at 3:30PM at the [facility] location. Dr. Moncada is not at the [facility] eye clinic at 3:30PM.     I called patient and his gerry picked up the phone. I wanted to confirm patient's appointment time he has with Dr. Moncada. Patient gerry told me that she received a phone call yesterday by a person named Kassy. She changed patient's appointment with Dr. Neely to 4/7/21 but with Marisela Bernal PA-C instead. She then changed patient's eye appointment to 4/7/21 at 3:30PM with Dr. Moncada. However, the appointment was scheduled at the [facility] location. I told patient I will call back once I verify with Dr. Moncada the appointment time.     ZONIA MENDOZA COT on 4/1/2021 at 9:32 AM

## 2021-04-01 NOTE — TELEPHONE ENCOUNTER
Albert Moncada MD Yang, Esther, COT             Hi Rosemary,     Let's schedule him earlier that day.  His neurosurgery appointment is at 2:30 pm so I'm thinking that isn't much time and if he arrives at 4:00 for his new eye appointment that won't work well.       Maybe offer him an 8:30 AM or 9:00 AM or 10 PM overbooked slot at [facility]?  3:30 is not going to work well.     Thank you. - Albert      Called patient to reschedule his 4/7/21 appointment to an earlier time and spoke to patient gf/filenora. I was able to reschedule patient's eye exam for 9AM that day. Provided patient the clinic address.     ZONIA MENDOZA COT on 4/1/2021 at 11:03 AM

## 2021-04-02 ENCOUNTER — TELEPHONE (OUTPATIENT)
Dept: NEUROSURGERY | Facility: CLINIC | Age: 46
End: 2021-04-02

## 2021-04-02 NOTE — TELEPHONE ENCOUNTER
Writer LVM for pt SO Gay letting them know that we can move their appt with Marisela Bernal up to 11am to coordinate better with pt other appts    Please help pt reschedule visit with Marisela Bernal for 11 AM if the pt wishes.    Kassy Fitzpatrick

## 2021-04-07 ENCOUNTER — TELEPHONE (OUTPATIENT)
Dept: NEUROLOGY | Facility: CLINIC | Age: 46
End: 2021-04-07

## 2021-04-07 ENCOUNTER — OFFICE VISIT (OUTPATIENT)
Dept: OPHTHALMOLOGY | Facility: CLINIC | Age: 46
End: 2021-04-07
Attending: OPHTHALMOLOGY
Payer: COMMERCIAL

## 2021-04-07 ENCOUNTER — OFFICE VISIT (OUTPATIENT)
Dept: NEUROSURGERY | Facility: CLINIC | Age: 46
End: 2021-04-07
Payer: COMMERCIAL

## 2021-04-07 VITALS
SYSTOLIC BLOOD PRESSURE: 137 MMHG | OXYGEN SATURATION: 97 % | BODY MASS INDEX: 32.29 KG/M2 | WEIGHT: 218 LBS | RESPIRATION RATE: 16 BRPM | DIASTOLIC BLOOD PRESSURE: 83 MMHG | HEIGHT: 69 IN | HEART RATE: 83 BPM

## 2021-04-07 DIAGNOSIS — H53.10 SUBJECTIVE VISUAL DISTURBANCE: ICD-10-CM

## 2021-04-07 DIAGNOSIS — H49.01 TOTAL RIGHT OCULOMOTOR NERVE PALSY: Primary | ICD-10-CM

## 2021-04-07 DIAGNOSIS — Z48.02 VISIT FOR SUTURE REMOVAL: ICD-10-CM

## 2021-04-07 DIAGNOSIS — D35.2 PITUITARY MACROADENOMA (H): Primary | ICD-10-CM

## 2021-04-07 DIAGNOSIS — H53.2 DOUBLE VISION: ICD-10-CM

## 2021-04-07 PROCEDURE — 99205 OFFICE O/P NEW HI 60 MIN: CPT | Mod: GC | Performed by: OPHTHALMOLOGY

## 2021-04-07 PROCEDURE — G0463 HOSPITAL OUTPT CLINIC VISIT: HCPCS | Mod: 25

## 2021-04-07 PROCEDURE — 92060 SENSORIMOTOR EXAMINATION: CPT | Performed by: OPHTHALMOLOGY

## 2021-04-07 PROCEDURE — 99024 POSTOP FOLLOW-UP VISIT: CPT | Performed by: PHYSICIAN ASSISTANT

## 2021-04-07 ASSESSMENT — CONF VISUAL FIELD
OS_INFERIOR_TEMPORAL_RESTRICTION: 2
OD_NORMAL: 1
OS_INFERIOR_NASAL_RESTRICTION: 2
OS_SUPERIOR_NASAL_RESTRICTION: 2
OS_SUPERIOR_TEMPORAL_RESTRICTION: 2

## 2021-04-07 ASSESSMENT — VISUAL ACUITY
OS_SC: 20/40
METHOD: SNELLEN - LINEAR
OD_PH_SC: 20/30
OD_SC: 20/60

## 2021-04-07 ASSESSMENT — CUP TO DISC RATIO
OS_RATIO: 0.3
OD_RATIO: 0.3

## 2021-04-07 ASSESSMENT — EXTERNAL EXAM - RIGHT EYE: OD_EXAM: COMPLETE PTOSIS

## 2021-04-07 ASSESSMENT — SLIT LAMP EXAM - LIDS
COMMENTS: NORMAL
COMMENTS: NORMAL

## 2021-04-07 ASSESSMENT — EXTERNAL EXAM - LEFT EYE: OS_EXAM: NORMAL

## 2021-04-07 ASSESSMENT — TONOMETRY
IOP_METHOD: ICARE
OD_IOP_MMHG: 16
OS_IOP_MMHG: 14

## 2021-04-07 ASSESSMENT — MIFFLIN-ST. JEOR: SCORE: 1854.22

## 2021-04-07 ASSESSMENT — PAIN SCALES - GENERAL: PAINLEVEL: EXTREME PAIN (8)

## 2021-04-07 NOTE — LETTER
2021    RE: Vincenzo Avery  : 1975  MRN: [id number]    Dear Dr. Neely,    Thank you for referring your patient, Vincenzo Avery, to my neuro-ophthalmology clinic recently.  After a thorough neuro-ophthalmic history and examination, I came to the following conclusions:     1. 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  Subjectively the patient reports that he feels his vision in the left eye has improved after his most recent surgery.  I informed him that it is possible to experience vision recovery for up to 6 months following decompression of the visual system however typically we will see the greatest recovery within the first month.  His visual acuity and color vision appears to be worse in the left eye as compared to the right.  I will plan to repeat visual fields and perform OCT at his next visit with me.    2. Post surgical right cranial nerve 3 palsy-the right oculomotor nerve was transected during the most recent tumor excision.  Based upon the mechanism of injury I do not expect there to be recovery.  The patient has a complete pupil involved 3rd nerve palsy with complete ptosis.  Based on the mechanism I do not believe we need to allow 6 to 9 months for spontaneous visual recovery as this is extremely unlikely to occur.  Reestablishing single binocular vision would be extremely challenging in this case however it is plausible that we can place the right eye near primary gaze instead of in a permanently abducted position.  I would suggest to Dr. Thorpe that perhaps we perform a combined procedure including eye muscle correction in the right eye as well as a frontalis / levator sling procedure.    3.  Complete ptosis  in the right eye: plan for appointment with Dr Thorpe in 3 weeks to discuss a possible levator sling in the right eye.  If there were to be cranial nerve 3 recovery, which would be highly unlikely, this is reversible.  Also if the patient were to later decide he would rather utilize predominately his left eye because of the intractable diplopia with the right eye open or due to the photophobia in the right eye with a dilated pupil then we can reverse the levator sling procedure.  Also patient would be amenable to wearing a contact lens that includes a printed iris to minimize light sensitivity in the right eye.    We will plan to discuss case with Dr. Thorpe after he sees the patient.    Patient was sent for consultation by Dr. Rolf Jain for complete right 3rd nerve palsy and impaired left vision is setting of recurrent pituitary macroadenoma.     HPI: He had 1-1.5 year history of progressive left eye temporal > right temporal vision loss which prompted work-up which revealed pituitary macroadenoma. He underwent resection with Dr. Neely on June 29, 2018. After initial resection, no substantial change in left eye with only a small crescent of nasal vision in the left eye.     At that time, functionally felt vision was essentially normal in the right eye. Vision was fairly stable. On MRI, Sept 2020 in setting fall and concussion, showed changes concerning for recurrence with an intradural component compression right optic tract and then Dr. Neely pursued additional imaging. He underwent recurrent resective surgery (right frontal craniotomy) on 3/22/2021, which was complicated by transection of the right cranial nerve 3. Symptomatically, complete right ptosis, dilated right eye, and down and out eye when eyelid held up. Visual acuity in right eye is still intact. Diplopia when both eyes are held open.     Interestingly, he has noted changes in his left eye since surgery on 3/22/2021  with improvement in the restricted visual field. He central scotoma, but improvement in peripheral vision. He feels that visual in left eye is often better at night, but his left vision is still blurry. He thinks this blurred vision is contributing to his headache.     Historical migraine headache.    Review of outside testing:    June 29, 2018: MRI brain with and without IV contrast:  Findings: Limited imaging performed for the purposes of stereotactic  technique demonstrates no significant change in predominantly solid  enhancing 4.5 x 3.9 x 5.0 cm sellar/parasellar mass with central  cystic component. The mass invades the right cavernous sinus and  encases the right internal carotid artery. Superior displacement and  splaying of the optic chiasm and cisternal optic nerves. Bony erosion  and depression of the floor of the sella with extension of tumor into  the sphenoid sinuses and right posterior ethmoid air cells. The mass  laterally displaces the right mesial temporal lobe, flattens the harsha  and posterior displaces the right cerebral peduncle.     September 25, 2018: MRI brain with and without IV contrast  Impression: As previously, findings of prior sellar and parasellar  mass resection, with residual right cavernous sinus and parasellar  mass (presumed residual pituitary macroadenoma) that continues to  mildly indent the right medial temporal lobe. Overall no significant  change in size.     March 23, 2021: MRI brain with and without IV contrast  Impression:  1. Postoperative changes consistent with right frontal temporal  craniotomy and partial tumor resection.  2. Vasogenic edema and mass effect in the right cranial fossa similar  to prior.   a. Unchanged 6 mm leftward midline shift.   b. Mass effect on the harsha and midbrain similar to prior.  3. Postoperative blood products accumulation deep to the craniotomy.  Additional blood products accumulation anterior to the harsha and  midbrain, and posterior to the  left cerebellum.  My interpretation performed today of outside testing:  I reviewed these images today from the MRIs described above and agree with the interpretations.     Review of outside clinical notes:  March 22, 2021: Reviewed neurosurgical operative report by Dr. Neely     Past medical history:  Chronic headache in setting pituitary macroadenoma     Family history / social history:   by trade     Exam:  Visual acuity in the right eye without correction is 20/60 and pinholes to 20/30.  Visual acuity in the left eye is 20/40 without correction.  The left eye did not improve with pinhole.  The right pupil was mydriatic at 7 mm and fixed without response to light.  The left pupil was reactive to light.  There was a left afferent pupillary defect.  The patient identified 11 of 11 Ishihara color plates in the right eye and 6 of 11 in the left eye.  There was complete ptosis of the right upper lid with no levator function.  The slit-lamp examination was unremarkable bilaterally.  Dilated fundus exam showed mild temporal pallor of the right optic nerve head and moderate diffuse pallor of the left optic nerve head.     Tests ordered and interpreted today:  Sensorimotor testing today revealed a complete right 3rd nerve palsy with complete loss of adduction as well as elevation and depression in the right eye.  There was some torsional movement of the right eye on attempted up in downgaze indicating intact right trochlear nerve.  The motility in the left eye was full.  In primary gaze there was a right hypotropia and exotropia which was incomitant and fit a  characteristic pattern for a right 3rd nerve palsy.    Discussion of management / interpretation with another provider:   Several weeks before our visit in clinic I discussed with Dr. Neely this case via phone.      Again, thank you for trusting me with the care of your patient.  For further exam details, please feel free to contact our office for  additional records.  If you wish to contact me regarding this patient please email me at [email or web address] or give my clinic a call to arrange a phone conversation.    Sincerely,    Albert Moncada MD  , Neuro-Ophthalmology and Adult Strabismus Surgery  [facility] in Neuro-Ophthalmology  Department of Ophthalmology and Visual Neurosciences  [facility]    DX: cranial nerve 3 palsy

## 2021-04-07 NOTE — TELEPHONE ENCOUNTER
Brief Telephone Encounter     Patient was seen in follow up in Neurosurgery today by Marisela Bernal for routine postoperative follow up. He was expecting to receive a refill of his opioid medications after this follow-up but they were not available at his pharmacy close to home. I recommended that he take acetaminophen and ibuprofen through the night and callback the Neurosurgery clinic tomorrow morning. I am not able to prescribe opioid medications.     Fiona Cortez MD  Neurosurgery PGY1

## 2021-04-07 NOTE — PROGRESS NOTES
1. 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  Subjectively the patient reports that he feels his vision in the left eye has improved after his most recent surgery.  I informed him that it is possible to experience vision recovery for up to 6 months following decompression of the visual system however typically we will see the greatest recovery within the first month.  His visual acuity and color vision appears to be worse in the left eye as compared to the right.  I will plan to repeat visual fields and perform OCT at his next visit with me.    2. Post surgical right cranial nerve 3 palsy-the right oculomotor nerve was transected during the most recent tumor excision.  Based upon the mechanism of injury I do not expect there to be recovery.  The patient has a complete pupil involved 3rd nerve palsy with complete ptosis.  Based on the mechanism I do not believe we need to allow 6 to 9 months for spontaneous visual recovery as this is extremely unlikely to occur.  Reestablishing single binocular vision would be extremely challenging in this case however it is plausible that we can place the right eye near primary gaze instead of in a permanently abducted position.  I would suggest to Dr. Thorpe that perhaps we perform a combined procedure including eye muscle correction in the right eye as well as a frontalis / levator sling procedure.    3.  Complete ptosis in the right eye: plan for appointment with Dr Thorpe in 3 weeks to discuss a possible levator sling in the right eye.  If there were to be cranial nerve 3 recovery, which would be highly unlikely, this is reversible.  Also if the patient were to later decide he would rather  utilize predominately his left eye because of the intractable diplopia with the right eye open or due to the photophobia in the right eye with a dilated pupil then we can reverse the levator sling procedure.  Also patient would be amenable to wearing a contact lens that includes a printed iris to minimize light sensitivity in the right eye.    We will plan to discuss case with Dr. Thorpe after he sees the patient.    Patient was sent for consultation by Dr. Rolf Jain for complete right 3rd nerve palsy and impaired left vision is setting of recurrent pituitary macroadenoma.     HPI:    He had 1-1.5 year history of progressive left eye temporal > right temporal vision loss which prompted work-up which revealed pituitary macroadenoma. He underwent resection with Dr. Neely on June 29, 2018. After initial resection, no substantial change in left eye with only a small crescent of nasal vision in the left eye.     At that time, functionally felt vision was essentially normal in the right eye. Vision was fairly stable. On MRI, Sept 2020 in setting fall and concussion, showed changes concerning for recurrence with an intradural component compression right optic tract and then Dr. Neely pursued additional imaging. He underwent recurrent resective surgery (right frontal craniotomy) on 3/22/2021, which was complicated by transection of the right cranial nerve 3. Symptomatically, complete right ptosis, dilated right eye, and down and out eye when eyelid held up. Visual acuity in right eye is still intact. Diplopia when both eyes are held open.     Interestingly, he has noted changes in his left eye since surgery on 3/22/2021 with improvement in the restricted visual field. He central scotoma, but improvement in peripheral vision. He feels that visual in left eye is often better at night, but his left vision is still blurry. He thinks this blurred vision is contributing to his headache.     Historical  migraine headache.    Review of outside testing:    June 29, 2018: MRI brain with and without IV contrast:  Findings: Limited imaging performed for the purposes of stereotactic  technique demonstrates no significant change in predominantly solid  enhancing 4.5 x 3.9 x 5.0 cm sellar/parasellar mass with central  cystic component. The mass invades the right cavernous sinus and  encases the right internal carotid artery. Superior displacement and  splaying of the optic chiasm and cisternal optic nerves. Bony erosion  and depression of the floor of the sella with extension of tumor into  the sphenoid sinuses and right posterior ethmoid air cells. The mass  laterally displaces the right mesial temporal lobe, flattens the harsha  and posterior displaces the right cerebral peduncle.     September 25, 2018: MRI brain with and without IV contrast  Impression: As previously, findings of prior sellar and parasellar  mass resection, with residual right cavernous sinus and parasellar  mass (presumed residual pituitary macroadenoma) that continues to  mildly indent the right medial temporal lobe. Overall no significant  change in size.     March 23, 2021: MRI brain with and without IV contrast  Impression:  1. Postoperative changes consistent with right frontal temporal  craniotomy and partial tumor resection.  2. Vasogenic edema and mass effect in the right cranial fossa similar  to prior.   a. Unchanged 6 mm leftward midline shift.   b. Mass effect on the harsha and midbrain similar to prior.  3. Postoperative blood products accumulation deep to the craniotomy.  Additional blood products accumulation anterior to the harsha and  midbrain, and posterior to the left cerebellum.    My interpretation performed today of outside testing:  I reviewed these images today from the MRIs described above and agree with the interpretations.     Review of outside clinical notes:  March 22, 2021: Reviewed neurosurgical operative report by Dr. Neely      Past medical history:  Chronic headache in setting pituitary macroadenoma     Family history / social history:   by trade     Exam:  Visual acuity in the right eye without correction is 20/60 and pinholes to 20/30.  Visual acuity in the left eye is 20/40 without correction.  The left eye did not improve with pinhole.  The right pupil was mydriatic at 7 mm and fixed without response to light.  The left pupil was reactive to light.  There was a left afferent pupillary defect.  The patient identified 11 of 11 Ishihara color plates in the right eye and 6 of 11 in the left eye.  There was complete ptosis of the right upper lid with no levator function.  The slit-lamp examination was unremarkable bilaterally.  Dilated fundus exam showed mild temporal pallor of the right optic nerve head and moderate diffuse pallor of the left optic nerve head.     Tests ordered and interpreted today:  Sensorimotor testing today revealed a complete right 3rd nerve palsy with complete loss of adduction as well as elevation and depression in the right eye.  There was some torsional movement of the right eye on attempted up in downgaze indicating intact right trochlear nerve.  The motility in the left eye was full.  In primary gaze there was a right hypotropia and exotropia which was incomitant and fit a  characteristic pattern for a right 3rd nerve palsy.    Discussion of management / interpretation with another provider:   Several weeks before our visit in clinic I discussed with Dr. Neely this case via phone.         65 minutes were spent on the date of the encounter by me doing chart review, history and exam, documentation, and further activities as noted above    Complete documentation of historical and exam elements from today's encounter can be found in the full encounter summary report (not reduplicated in this progress note).  I personally obtained the chief complaint(s) and history of present illness.  I confirmed and  edited as necessary the review of systems, past medical/surgical history, family history, social history, and examination findings as documented by others; and I examined the patient myself.  I personally reviewed the relevant tests, images, and reports as documented above.  I formulated and edited as necessary the assessment and plan and discussed the findings and management plan with the patient and family.  I personally reviewed the ophthalmic test(s) associated with this encounter, agree with the interpretation(s) as documented by the resident/fellow, and have edited the corresponding report(s) as necessary.     MD Johana Peng MD -neurology resident

## 2021-04-07 NOTE — NURSING NOTE
Chief Complaint   Patient presents with     Surgical Followup     [facility] Return - 2 week post-op     Chris Escamilla

## 2021-04-07 NOTE — PROGRESS NOTES
"  [facility]      Name: Vincenzo Avery  MRN: [id number]  Age: 46 year old  : 1975  2021      Chief Complaint:   Recurrent pituitary macroadenoma s/p EEA for resection 2018 with regrowth now s/p right frontotemporal craniotomy for resection 3/22/2021, 2 week postop     History of Present Illness:   Vincenzo Avery is a pleasant 46 year old male with a history of NIC and migraine headaches, here for a 2 week postoperative visit. He's accompanied by his girlfriend, Gay. He underwent a right frontotemporal craniotomy for pituitary adenoma resection on 3/22/2021 with Dr. Neely. He has a postoperative right 3rd nerve palsy and saw Dr. Moncada in NeuroOphthalmology today for evaluation after surgery. He is struggling with head pain and is requesting more opioids today. He also notes that he has chronic low back pain and that seems worse since surgery. He denies worsening headaches, fever, vision changes other than mentioned, paresthesias, or weakness.       Review of Systems:   Pertinent items are noted in HPI or as in patient entered ROS below, remainder of complete ROS is negative.     Physical Exam:   /83   Pulse 83   Resp 16   Ht 1.753 m (5' 9\")   Wt 98.9 kg (218 lb)   SpO2 97%   BMI 32.19 kg/m    General: No acute distress.     Resp: No respiratory distress.   MSK: Moves all extremities.  No obvious deformity.  Neuro: The patient is fully oriented. Speech is normal. He has a complete right CN3 palsy with right ptosis. Left eye EOM intact. Facial sensation is intact in V1, V2, V3 distributions. Facial nerve function is normal save his right ptosis. Full strength in all extremities. Gait is normal.  Psych: Normal mood and affect. Behavior is normal.    Incision: His right frontotemporal incision is clean, dry, intact and well healed. Sutures were removed today using sterile instrumentation, no complications. He has mild swelling and fluid underneath " suture line without erythema or drainage. I also removed his lumbar drain incision (monocryl) as it was irritating.    Imaging:  No new imaging today     Assessment:  1. Recurrent pituitary macroadenoma s/p EEA for resection 6/29/2018 with regrowth now s/p right frontotemporal craniotomy for resection 3/22/2021, 2 week postop  2. Postoperative complete right 3rd nerve palsy  3. Suture removal    Plan:  1. Will refill hydrocodone one more time and asked him to work hard on transitioning to tylenol and ibuprofen, alternating, for pain management. Follow up in 3 months with Dr. Neely, with MRI prior to visit.  2. Per Dr. Moncada, who will continue to follow. Patient has a follow up appointment scheduled with Dr. Thorpe in 1 month.  3. Wound care discussed, follow up prn.    We discussed genetic testing of his adenoma given the aggressive nature of it's growth and he would like to proceed. Consent paperwork signed today.    He was encouraged to call with any new questions or concerns.       Marisela Bernal PA-C

## 2021-04-08 ENCOUNTER — TELEPHONE (OUTPATIENT)
Dept: NEUROSURGERY | Facility: CLINIC | Age: 46
End: 2021-04-08

## 2021-04-08 NOTE — TELEPHONE ENCOUNTER
FUTURE VISIT INFORMATION      FUTURE VISIT INFORMATION:    Date: 5.11.21    Time: 12:15    Location: [facility]  REFERRAL INFORMATION:    Referring provider:  Dr Albert Moncada    Referring providers clinic:  [facility]    Reason for visit/diagnosis: evaluation    RECORDS REQUESTED FROM:       Clinic name Comments Records Status Imaging Status   [facility] 4.7.21 Dr Moncada Internal    [facility] 3.22.21 Dr Neely, procedure   Internal    [facility] 6.29.18 Dr Caro, procedure Internal    [facility] 10.21.20 Scanned    Imaging - Internal 3.23.21 MR brain, CT head  3.22.21 MR pituitary, CT head  More in [facility] Internal Internal   Imaging - [facility] 3.4.21 MRI pituitary  9.14.20 MRI pituitary/brain  3.28.19 MRI brain/pituitary  4.25.18 MRI brain/pituitary Internal PACS     Action 4.8.21 8:23 AM SUZETTE   Action Taken Called and requested 3.4.21 MRI from [facility]

## 2021-04-16 NOTE — OP NOTE
Procedure Date: 03/22/2021      PREOPERATIVE DIAGNOSES:     1.  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     2.  Brainstem compression secondary to #1.      POSTOPERATIVE DIAGNOSES:   1.  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     2.  Brainstem compression secondary to #1.      TITLE OF PROCEDURES:   1.  Right frontotemporal craniotomy.   2.  Right zygomatic osteotomy.   3.  Combined extradural-intradural transzygomatic approach for resection of pituitary adenoma.   4.  Intraoperative use of microscope.   5.  Intraoperative use of frameless stereotactic neuronavigation.   6.  Lumbar drain placement.     ANESTHESIA:  GETA.      ATTENDING SURGEON:  Rolf Neely MD      ASSISTANTS:   1.  Esequiel Peguero MD.   2.  John Leschke, MD      HISTORY OF PRESENT ILLNESS:  Mr. Avery is a 46-year-old man who had a large pituitary adenoma with suprasellar and right parasellar extension.  He underwent an endoscopic endonasal resection of the intrasellar and suprasellar component nearly 3 years ago with decompression of the optic apparatus.  We have been following the residual right parasellar component.  This component has progressively enlarged and now has an intradural component extending towards the right cerebral peduncle, causing compression.  The patient has been experiencing increased headaches.  The growth of this residual tumor is now approaching the right optic nerve, though not causing compression.  The patient's original vision loss recovered incompletely after his initial operation.  His left-sided vision remains poor.  Based on his young age and degree of tumor recurrence and its anticipated growth, we determined that further resection is indicated.  We determined that a transcranial combined extradural- intradural approach was the best strategy for this multi-compartment tumor.  He presents for the above stated procedures.      DESCRIPTION OF PROCEDURE:  Upon  intubation and induction of general anesthesia, the patient was placed in the lateral position on the operating table.  His lower back was prepared and draped in the usual sterile fashion.  We advanced a 14-gauge Tuohy needle in the midline through a lower lumbar interspinous space.  We advanced the needle until we obtained brisk return of CSF.  We advanced a lumbar drain catheter over a wire without resistance.  The wire and needle were then removed.  The catheter was connected to a closed drainage system.  The drain was secured to the skin and a sterile dressing was applied.  The patient was then returned to the supine position.      The Frankel head syed was applied and his head was turned about 30 degrees to the left and extended.  After securing his head in this position, scalp fiducials were registered and frameless stereotactic neuronavigation was established with the Stealth system.  Electrodes for SSEP, EEG, and extraocular nerve EMG (cranial nerves III, IV and VI) were established by the [employer] staff.  We planned a right frontotemporal scalp incision that extended just beyond midline, behind the hairline.  His scalp was prepared and draped in the usual sterile fashion.  Planned incision was infiltrated with 0.25% bupivacaine with epinephrine.  The timeout was performed.      The scalp incision was carried down to the pericranium and temporalis fascia, exposing the root of the zygoma.  The pericranium was incised and elevated off the skull as a separate vascularized flap.  The temporalis fascia was incised and elevated off the temporalis muscle.  Subfascial dissection was performed and the orbitozygomatic bar was exposed.  We used the cutting tool on the Sonopet to complete zygomatic osteotomies and the zygomatic arch was set aside.  Temporalis muscle was then incised with monopolar cautery, leaving a cuff attached to the superior temporal line.  The myocutaneous flap was retracted anteriorly and  inferiorly.  Bur holes were placed at the keyhole, frontal convexity and squamous temporal bone.  A frontotemporal craniotomy was completed using the high-speed cutting drill.  The bone flap was elevated and set aside.  We then resected residual squamous temporal bone until we reached the floor of the middle fossa.  We also resected bone anteriorly to expose the temporal tip.  The sphenoid ridge was flattened and we identified the frontotemporal dural fold.  We resected additional bone and followed this to the superior orbital fissure.  The operative microscope was brought into the field.      Using a using the 2 and 3 mm keiry drill bits and under continuous irrigation, we unroofed the superior orbital fissure.  We cut the frontotemporal dural fold to expose the anterior clinoid process.  An anterior clinoidectomy was completed using the high-speed keiry drill bit.  Once the clinoidectomy was completed, we proceeded with the extradural approach to the parasellar component of the tumor.      We continued the incision in the frontotemporal dural fold and identified the interdural plane.  We developed this plane and retracted the dura propria from the parietal dura.  We followed this plane anteriorly and inferiorly towards the foramen spinosum.  Good brain relaxation was obtained with CSF relaxation from the lumbar drain and osmodiuresis.  We identified the exophytic component of the parasellar tumor.  We confirmed its location using neuronavigation.  We then incised the tumor capsule between V2 and V3.  The tumor was relatively firm and we used a combination of suction, microscissors, and curettes to debulk this component of the tumor.  Specimens were sent for permanent pathology.  Intermittent cavernous sinus bleeding was controlled with Gelfoam slurry and tamponade.  We identified the parasellar internal carotid artery and obtained intermittent activity from cranial nerve   VI  The firm tumor consistency and  copious bleeding from the cavernous sinus prohibited additional tumor resection.  Once hemostasis was achieved, we turned our attention to the intradural component.      The dura was opened in a curvilinear fashion and retracted against the sphenoid ridge.  We opened the sylvian fissure using a combination of sharp and blunt microdissection.  We opened the sylvian fissure proximally until we reached the carotid cistern.  We opened the carotid cistern and confirmed that the right optic nerve was decompressed.  We released the arachnoid bands along the basal frontal lobe.  We mobilized the uncus laterally and identified tumor between the internal carotid artery and the tentorial edge.  We identified tumor.  The tumor capsule was densely adherent to the carotid artery, the posterior communicating artery and the anterior choroidal artery.  We entered the tumor capsule and debulked using the ultrasonic aspirator and suction and microscissors.  These specimens were also sent for permanent pathology.  After debulking this component of the tumor, we were able to dissect the tumor capsule off the aforementioned arteries.  We retracted the internal carotid artery medially and the uncus posterolaterally to widen the working space.  As before, the tumor consistency was firm.  We continued debulking the tumor and identified the basilar artery in the superior cerebellar artery.  We followed the tumor.  The tumor was densely adherent to the uncus.  As we continued debulking the tumor, we could not see the oculomotor nerve.  There was no activity recorded from the extraocular nerves during removal of this portion of the tumor.  As we resected the tumor capsule, we recognized that the oculomotor nerve was adherent to the ventral surface of the tumor, and the nerve was transected.  We dissected the remaining tumor capsule off the nerve and identified the posterior cerebral artery and superior cerebellar artery.  We confirmed  decompression of the cerebral peduncle.  We continued resecting the tumor, following the oculomotor trigone.  Once we reached the limit of this intradural exposure, we decided to stop tumor resection.  We confirmed that the right optic nerve was completely decompressed.  We also confirmed patency of all of the arteries in the field.  The wound was irrigated and bleeding points were secured.  There were no changes in SSEP or EEG monitoring.      The dura was reapproximated with interrupted and running 4-0 Nurolon.  A layer of Duragen was applied and in an onlay fashion on the dural closure.  The bone flap was replaced and secured with Synthes mini plates and screws.  We reapproximated the temporalis muscle and fascia using interrupted 2-0 Vicryls.  The zygomatic arch was replaced and secured with Synthes mini plates and screws.  The scalp was then closed in 2 layers.   A sterile dressing was applied.  The head syed was removed.  The patient was extubated and transported to the recovery room without incident.  Blood loss was approximately 500 mL.  The lumbar drain was kept in place.  Sponge and needle counts were correct at the end of the case.        I was present for the key portions and immediately available for the entire operation.         JANUSZ CHADWICK MD             D: 2021   T: 2021   MT: DANTE      Name:     TAMMY OMER   MRN:      [id number]        Account:        [id number]   :      1975           Procedure Date: 2021      Document: [id number]

## 2021-04-19 ENCOUNTER — TELEPHONE (OUTPATIENT)
Dept: NEUROSURGERY | Facility: CLINIC | Age: 46
End: 2021-04-19

## 2021-04-19 NOTE — TELEPHONE ENCOUNTER
[facility] Call Center    Phone Message    May a detailed message be left on voicemail: yes     Reason for Call: Other: Gay calling to request a call back to discuss the right side of Vincenzo's head where surgery was done is swelling and he is sleeping all of the time. She stated that the light hurts his eyes, she thinks it may be from the pressure in his head. The swelling have gotten worse since his last visit. She is wondering how long is it supposed to take until the swelling and pain go away. Please call Gay at your earliest convenience to discuss.     Action Taken: Message routed to:  [facility] NEUROS[facility]GERY    Travel Screening: Not Applicable

## 2021-04-19 NOTE — TELEPHONE ENCOUNTER
"Callback to Yasmine.   Sutures removed by RICK CHAPARRO 4/7/21    Patient now has puffy grapefruit size lump at incision area by right ear going up side of head that comes and goes some.  Yasmine states lump appears to have increased in size some.   Yasmine states  his demeanor has worsened in the past 2 weeks, very crabby, tired of all the \"pressure and unable to see\"   Patient states \"sick and tired of being poked prodded and does not want to go to MD\".    Headaches and pressure being managed by Tylenol and Ipuprofen and occasional Lortab.   Patient is sleeping most of day, up only 3 hours of the day.  Example today up at 9am to let dog out and back to bed 10am for the day.    Patient is eating at \"least one time per day\".    Will refer to RICK CHAPARRO and Dr Neely and get back to Yasmine .    Voices understanding, Yasmine has my name and number if needed.            "

## 2021-04-19 NOTE — TELEPHONE ENCOUNTER
Trying to get patient in clinic for an office visit .  Brielle Barros NP can see patient at 0945 in the morning tomorrow and check in with Dr Neely.    Callback to Yasmine, left my name and number for her to call back to see if 0945 appointment will work.  Call Alina MEDINA

## 2021-04-20 ENCOUNTER — OFFICE VISIT (OUTPATIENT)
Dept: NEUROSURGERY | Facility: CLINIC | Age: 46
End: 2021-04-20
Payer: COMMERCIAL

## 2021-04-20 VITALS
BODY MASS INDEX: 31.82 KG/M2 | RESPIRATION RATE: 16 BRPM | SYSTOLIC BLOOD PRESSURE: 122 MMHG | WEIGHT: 215.5 LBS | DIASTOLIC BLOOD PRESSURE: 82 MMHG | HEART RATE: 85 BPM | OXYGEN SATURATION: 98 %

## 2021-04-20 DIAGNOSIS — Z98.890 S/P CRANIOTOMY: Primary | ICD-10-CM

## 2021-04-20 DIAGNOSIS — D35.2 PITUITARY MACROADENOMA (H): ICD-10-CM

## 2021-04-20 PROCEDURE — 99024 POSTOP FOLLOW-UP VISIT: CPT | Performed by: NURSE PRACTITIONER

## 2021-04-20 ASSESSMENT — PAIN SCALES - GENERAL: PAINLEVEL: WORST PAIN (10)

## 2021-04-20 NOTE — PATIENT INSTRUCTIONS
-Continue with current pain management. Should weaning off opioid   -Follow up with Dr. Neely in 3 month with repeat MRI ( appointment scheduled)

## 2021-04-20 NOTE — TELEPHONE ENCOUNTER
Yasmine returned call, able to make 0945 appointment in the morning with Brielle Barros NP.  Brielle approved, note sent to scheduling.

## 2021-04-20 NOTE — LETTER
4/20/2021       RE: Vincnezo Avery  [address]  [place] [address]     Dear Colleague,    Thank you for referring your patient, Vincenzo Avery, to the [facility] NEUROSURGERY [facility]INIC [place] at [facility]. Please see a copy of my visit note below.    [facility]   Neurosurgery Clinic Progress Note    Reason for Visit:            Recurrent pituitary macroadenoma s/p EEA for resection   S/p Stealth Assisted Right Frontotemporal Craniotomy for Tumor Resection with Lumbar Drain Placement, 3/22/2021     Incision swelling   Headache    History of Presenting Illness:   It was a pleasure to see Mr. Avery today in-person clinic.     Briefly, Vincenzo Avery is a  46 year old male with past medical history includes Obstructive Sleep Apnea, Migraine Headaches and known Pituitary Mass, s/p  subtotal endoscopic endonasal approach for tumor resection. His recurrent pituitary macroadenoma caused him fatigue, bitemporal headaches and bilateral tinnitus. Utlimately, he underwent a Right Frontotemporal Craniotomy for tumor resection on 3/22 by Dr. Neely. Since the suture removal, he complains of worsening swelling over the incision area and headache. He is here today for evaluation of the incision area.     Today, he complains of intermittent swelling over the incision area, worsening over the past 2 weeks. He denies drainage, tenderness, redness, neck pain, or fever. His main concern today is the constant daily headache describes as pressure, and sharp, located to the right frontal, right eye, and right ear areas. He also complains of photophobia and phonophobia. He is taking Tylenol and Norco with some relief. 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 At this point, he just wants to focus on the healing process of his surgery and will follow up with the eye later. He concerns of daytime sleepiness.  He denies new vision changes, speech difficulty, swallowing problem, N/V, weakness, numbness or any neurological deficits.    Examination:   /82   Pulse 85   Resp 16   Wt 97.8 kg (215 lb 8 oz)   SpO2 98%   BMI 31.82 kg/m      Neurological Assessment:   General Appearance: Awake; Well-Nourished; Pleasant; In no apparent distress  Mental Status: Alert and Oriented to Person, Place, Time and Situation  Speech: Fluent; No aphasia or dysarthria  Cranial Nerves:  Pupils Equal and Reactive to Light and Accommodation  Extra-Ocular Movements Intact  Facial Movements Symmetric  Facial Sensation Intact to Light Touch in the V1-V3 distributions bilaterally  Shoulder Shrug 5/5  Right eye CN III palsy with ptosis   Motor:  Upper Extremities: 5/5  Lower Extremities: 5/5  Incision:   Incision is clean, dry, intact and healing well. No redness, drainage, or tenderness. Moderate soft tissue swelling over the right upper forehead. Incision was seen by Dr. 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 There is no concern of CSF leakage, acute postoperative hydrocephalus, and meningitis at this point. His incision is healing well without any drainage, erythema, or complains of fever. He still has constant headache which is likely related to the pressure of the fluid collection. He can continue to alternate Tylenol and Ibuprofen as needed and should wean off opioid as soon as he can. The swelling will improve over the next couple months. He can continue to rest as much he needs during this healing process. He will follow up with Dr. Neely in 3 month with a  repeat MRI as previously plan. He will follow up with Neurophthalmology for management of his right nerve palsy.      At the end of the visit, all the patient's questions and concerns had been addressed and the patient was agreeable with the plan of care as outlined above. The patient has our office contact information and knows to call with any questions, concerns, or changes in their condition.      Brielle Barros NP  Neurosurgery Nurse Practitioner  [facility]  [phone]

## 2021-04-20 NOTE — PROGRESS NOTES
[facility]   Neurosurgery Clinic Progress Note      Reason for Visit:            Recurrent pituitary macroadenoma s/p EEA for resection   S/p Stealth Assisted Right Frontotemporal Craniotomy for Tumor Resection with Lumbar Drain Placement, 3/22/2021     Incision swelling   Headache    History of Presenting Illness:   It was a pleasure to see Mr. Avery today in-person clinic.     Briefly, Vincenzo Avery is a  46 year old male with past medical history includes Obstructive Sleep Apnea, Migraine Headaches and known Pituitary Mass, s/p  subtotal endoscopic endonasal approach for tumor resection. His recurrent pituitary macroadenoma caused him fatigue, bitemporal headaches and bilateral tinnitus. Utlimately, he underwent a Right Frontotemporal Craniotomy for tumor resection on 3/22 by Dr. Neely. Since the suture removal, he complains of worsening swelling over the incision area and headache. He is here today for evaluation of the incision area.     Today, he complains of intermittent swelling over the incision area, worsening over the past 2 weeks. He denies drainage, tenderness, redness, neck pain, or fever. His main concern today is the constant daily headache describes as pressure, and sharp, located to the right frontal, right eye, and right ear areas. He also complains of photophobia and phonophobia. He is taking Tylenol and Norco with some relief. 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 At this point, he just wants to focus on the healing process of his surgery and will follow up with the eye later. He concerns of daytime sleepiness. He denies new vision changes, speech difficulty, swallowing problem, N/V, weakness, numbness or any neurological deficits.    Examination:   /82   Pulse 85   Resp 16   Wt 97.8 kg (215 lb 8 oz)   SpO2 98%   BMI 31.82 kg/m      Neurological Assessment:   General Appearance: Awake; Well-Nourished;  Pleasant; In no apparent distress  Mental Status: Alert and Oriented to Person, Place, Time and Situation  Speech: Fluent; No aphasia or dysarthria  Cranial Nerves:  Pupils Equal and Reactive to Light and Accommodation  Extra-Ocular Movements Intact  Facial Movements Symmetric  Facial Sensation Intact to Light Touch in the V1-V3 distributions bilaterally  Shoulder Shrug 5/5  Right eye CN III palsy with ptosis   Motor:  Upper Extremities: 5/5  Lower Extremities: 5/5  Incision:   Incision is clean, dry, intact and healing well. No redness, drainage, or tenderness. Moderate soft tissue swelling over the right upper forehead. Incision was seen by Dr. 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 There is no concern of CSF leakage, acute postoperative hydrocephalus, and meningitis at this point. His incision is healing well without any drainage, erythema, or complains of fever. He still has constant headache which is likely related to the pressure of the fluid collection. He can continue to alternate Tylenol and Ibuprofen as needed and should wean off opioid as soon as he can. The swelling will improve over the next couple months. He can continue to rest as much he needs during this healing process. He will follow up with Dr. Neely in 3 month with a repeat MRI as previously plan. He will follow up with Neurophthalmology for management of his right nerve palsy.      At the end of the visit, all the patient's questions and concerns had been addressed and the patient was agreeable with the plan of care as outlined above. The patient has our office contact  information and knows to call with any questions, concerns, or changes in their condition.        Brielle Barros NP  Neurosurgery Nurse Practitioner  [facility]  [phone]

## 2021-05-06 LAB — LAB SCANNED RESULT: NORMAL

## 2021-05-07 ENCOUNTER — TELEPHONE (OUTPATIENT)
Dept: OPHTHALMOLOGY | Facility: CLINIC | Age: 46
End: 2021-05-07

## 2021-05-07 NOTE — TELEPHONE ENCOUNTER
Patient cancelled appointment during reminder calls as patient wants to see what everything looks like after swelling goes down. Patient declined rescheduling at this time.-Per Patient

## 2021-05-11 ENCOUNTER — PRE VISIT (OUTPATIENT)
Dept: OPHTHALMOLOGY | Facility: CLINIC | Age: 46
End: 2021-05-11

## 2021-05-12 ENCOUNTER — CARE COORDINATION (OUTPATIENT)
Dept: NEUROSURGERY | Facility: CLINIC | Age: 46
End: 2021-05-12

## 2021-05-12 NOTE — PROGRESS NOTES
Writer scanned NGS form to patient chart and emailed form to Dr. Peguero.     Alesia Scales LPN   Neurosurgery

## 2021-06-21 ENCOUNTER — TELEPHONE (OUTPATIENT)
Dept: NEUROSURGERY | Facility: CLINIC | Age: 46
End: 2021-06-21

## 2021-06-21 NOTE — TELEPHONE ENCOUNTER
[facility] Call Center    Phone Message    May a detailed message be left on voicemail: yes     Reason for Call: Other: Pt's significant other called to advise [facility] advised that they do not yet have the order for MRI.      Please fax order to [facility] at [phone] so Pt can have MRI priot to 7/6/21 appt with Dr. Neely.    Action Taken: Message routed to:  [facility] ([facility] Neurosurgery    Travel Screening: Not Applicable

## 2021-06-22 NOTE — TELEPHONE ENCOUNTER
Writer called Gay, patient significant other to inform orders for MR faxed to [facility].     Alesia Scales LPN  Neurosurgery

## 2021-06-30 ENCOUNTER — TELEPHONE (OUTPATIENT)
Dept: NEUROSURGERY | Facility: CLINIC | Age: 46
End: 2021-06-30

## 2021-06-30 NOTE — TELEPHONE ENCOUNTER
[facility] Call Center    Phone Message    May a detailed message be left on voicemail: yes     Reason for Call: Order(s): Other:   Reason for requested: MRI order faxed to [facility] Rashid patients girlfriend is calling to check the status on the MRI order that was supposed to be faxed to [facility]. Gay states that when calling [facility] to schedule they inform her that the they do not have the order.   Patient is scheduled with Dr. Neely on 07/06/21 and requesting to get the MRI scheduled asap with RICHARD.    [facility] at [phone]     Date needed: asap  Provider name: Dr. Neely      Action Taken: Message routed to:  [facility] ([facility] NEUROSURGERY    Travel Screening: Not Applicable

## 2021-06-30 NOTE — TELEPHONE ENCOUNTER
[facility] Call Center    Phone Message    May a detailed message be left on voicemail: yes     Reason for Call: Order(s): Other:   Reason for requested: MRI of Brain  Date needed: Gay nova reporting that she has gone back and forth with our clinic regarding getting this order sent to [facility]  Provider name: Dr. Neely    FAX# to [facility]:  [phone]    Gay is requesting that this order is faxed to [facility] asap as pt has the appt on 7/06/21 with Dr. Neely and needs to get this MRI done asap. Thank you.      Action Taken: Message routed to:  [facility] ([facility]): [facility]    Travel Screening: Not Applicable

## 2021-06-30 NOTE — TELEPHONE ENCOUNTER
Writer re-faxed orders to [facility].   Called [facility] to confirm orders received   Called Gay to confirm patient has appointment.   Patient has appointment with [facility] on 07/02/21.     Writer will call [facility] to have imaging pushed to [facility], report scanned to Epic.     Alesia Scales LPN  Neurosurgery

## 2021-06-30 NOTE — TELEPHONE ENCOUNTER
" [facility] Call Center    Phone Message    May a detailed message be left on voicemail: yes     Reason for Call: Other: [facility] reports that they received the cover letter and \"not the order or the order #. Gay reported that Alian from Dr. Neely's team has tried 3x today to send that order to [facility] but [facility] is not getting what they need. Please call Gay to help her asap. Thank you.    Action Taken: Message routed to:  [facility]    Travel Screening: Not Applicable                                                                      "

## 2021-06-30 NOTE — TELEPHONE ENCOUNTER
Spoke with SUJIT Underwood Faxed to [facility] at [phone] via Epic    Callback for any issues.  VOices understanding

## 2021-07-02 ENCOUNTER — CARE COORDINATION (OUTPATIENT)
Dept: NEUROSURGERY | Facility: CLINIC | Age: 46
End: 2021-07-02

## 2021-07-02 NOTE — PROGRESS NOTES
07/0121 MR Brain Pituitary   Writer had imaging from [facility] pushed to Pacs/resolved to MRN  Findings faxed to Right Fax/emailed to RNCC for immediate review and scanned to patient chart.    RNCC Alina Negron notified via Epic message    Alesia Scales LPN  Neurosurgery

## 2021-07-06 ENCOUNTER — VIRTUAL VISIT (OUTPATIENT)
Dept: NEUROSURGERY | Facility: CLINIC | Age: 46
End: 2021-07-06
Payer: COMMERCIAL

## 2021-07-06 VITALS
HEART RATE: 92 BPM | OXYGEN SATURATION: 97 % | HEIGHT: 69 IN | SYSTOLIC BLOOD PRESSURE: 111 MMHG | BODY MASS INDEX: 30.81 KG/M2 | RESPIRATION RATE: 16 BRPM | WEIGHT: 208 LBS | DIASTOLIC BLOOD PRESSURE: 79 MMHG

## 2021-07-06 DIAGNOSIS — H49.01 THIRD NERVE PALSY, RIGHT: ICD-10-CM

## 2021-07-06 DIAGNOSIS — Z98.890 S/P CRANIOTOMY: ICD-10-CM

## 2021-07-06 DIAGNOSIS — D35.2 PITUITARY MACROADENOMA (H): Primary | ICD-10-CM

## 2021-07-06 PROCEDURE — 99207 PR NO DOCUMENTATION ON VISIT: CPT | Performed by: NEUROLOGICAL SURGERY

## 2021-07-06 ASSESSMENT — MIFFLIN-ST. JEOR: SCORE: 1808.86

## 2021-07-06 ASSESSMENT — PAIN SCALES - GENERAL: PAINLEVEL: NO PAIN (0)

## 2021-07-06 NOTE — LETTER
7/6/2021      RE: Vincenzo PAREKH [address]  [facility] [address]       See dictated note. Visit 25 minutes.  MD Rolf Gilmore MD

## 2021-07-06 NOTE — PATIENT INSTRUCTIONS
Follow up with Dr Neely in September.  No Imaging needed    Call Alina MEDINA for questions/concerns     Thank you for using [employer]

## 2021-07-06 NOTE — LETTER
7/6/2021       RE: Vincenzo Avery  [address] [place] [address] [address]  [facility] [address]     Dear Colleague,    Thank you for referring your patient, Vincenzo Avery, to the [facility] NEUROSURGERY CLINIC [facility]. Please see a copy of my visit note below.    See dictated note. Visit 25 minutes.  ODALYS Neely MD      Again, thank you for allowing me to participate in the care of your patient.      Sincerely,    Rolf Neely MD

## 2021-07-07 NOTE — PROGRESS NOTES
Service Date: 2021    Brice Meneses MD  [address] [facility], [address]  [place]  [address]    RE:     Vincenzo Avery  MRN:  [id number]  :  1975    Dear Dr. Meneses:    We saw Mr. 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  As you know, he originally presented with a very large pituitary macroadenoma causing optic compression as well as right parasellar extension.  He did well from an endoscopic endonasal subtotal resection in 2018, but the residual tumor in the right parasellar space grew and followed the oculomotor nerve intradurally.  We took him back for a right transzygomatic combined extradural-intradural resection of the tumor.  We were able to resect all of the intradural component, but the right oculomotor nerve was transected.  Consequently, he now relies on his left eye for vision, and this was the side of worst vision at baseline.  He had severe postoperative headaches as well.      He is much improved now.  He still has some sharp pains over the right eye, but the severe headaches have resolved.  He believes his left-sided vision has improved slightly.  His fatigue and energy levels are improving.  He is accompanied by his significant other and his son today.      PHYSICAL EXAMINATION:  On exam, his general appearance is good.  He has lost a fair amount of weight since we last saw him over 4 months ago.  Most obvious feature is the complete ptosis on the right side.  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  He has mild right temporalis atrophy.  Left pupil does react to light.  His speech and language are normal.  He moves all extremities with normal strength.      REVIEW OF STUDIES:  We went over his MRI from earlier this month and compared it to his original MRI prior to his first surgery and the MRI from last year, prior to his second surgery.  The sella and optic apparatus remain decompressed.  The  residual tumor is confined to the right parasellar space.  There has been some debulking of the parasellar component as seen with the patchy enhancement in this component.  The intradural component has been completely removed.  There is no tumor involvement with the brainstem.  There is extension of the parasellar component inferiorly, and this involves the paraclival segment of the right internal carotid artery as well.      IMPRESSION AND PLAN:  Mr. Avery was in good spirits today and he is doing reasonably well, taking into account his limited vision now.  His tumor has demonstrated that it can recur in a relatively short amount of time.  There is still considerable residual in the right parasellar space.  Since the right eye is nonfunctional currently, it is reasonable to debulk more tumor in the right parasellar and paraclival spaces before considering radiation therapy.  The main risk in such an approach is to the right internal carotid artery, but this tumor was relatively soft and with an extended endonasal approach, we believe more tumor can be resected.  He understands all of this and agrees to proceed.  He is still recovering from his recent operation, and we will give him a few more months to recover and then consider surgery through a redo endoscopic endonasal approach later in this calendar year.  We will follow up with him by phone in about 2 months and then finalize plans for surgery.  We will also review this case with our partner, Dr. Peguero, and ask for his involvement in the redo endonasal approach.  Please do not hesitate to contact us with questions.    Sincerely,      Rolf Neely MD        D: 2021   T: 2021   MT: triston    Name:     TAMMY AVERY  MRN:      [id number]        Account:      [id number]   :      1975           Service Date: 2021       Document: [id number]

## 2021-08-09 ENCOUNTER — TELEPHONE (OUTPATIENT)
Dept: NEUROSURGERY | Facility: CLINIC | Age: 46
End: 2021-08-09

## 2021-09-13 ENCOUNTER — TELEPHONE (OUTPATIENT)
Dept: NEUROSURGERY | Facility: CLINIC | Age: 46
End: 2021-09-13

## 2021-09-17 ENCOUNTER — TELEPHONE (OUTPATIENT)
Dept: NEUROSURGERY | Facility: CLINIC | Age: 46
End: 2021-09-17

## 2021-09-20 ENCOUNTER — TELEPHONE (OUTPATIENT)
Dept: NEUROSURGERY | Facility: CLINIC | Age: 46
End: 2021-09-20

## 2021-10-07 ENCOUNTER — VIRTUAL VISIT (OUTPATIENT)
Dept: NEUROSURGERY | Facility: CLINIC | Age: 46
End: 2021-10-07
Payer: COMMERCIAL

## 2021-10-07 ENCOUNTER — TELEPHONE (OUTPATIENT)
Dept: NEUROSURGERY | Facility: CLINIC | Age: 46
End: 2021-10-07

## 2021-10-07 DIAGNOSIS — D35.2 PITUITARY MACROADENOMA (H): Primary | ICD-10-CM

## 2021-10-07 PROCEDURE — 99213 OFFICE O/P EST LOW 20 MIN: CPT | Mod: 95 | Performed by: NEUROLOGICAL SURGERY

## 2021-10-07 NOTE — PROGRESS NOTES
Vincenzo is a 46 year old who is being evaluated via a billable telephone visit.      What phone number would you like to be contacted at? [phone]  How would you like to obtain your AVS? Mail  Phone call duration: 10 minutes    Discussed management of residual pituitary adenoma. He is interested in corrective surgery for the third nerve palsy on the right side. We believe that additional surgery is feasible through an endonasal, endoscopic approach to remove more right parasellar tumor. Headaches have improved considerably. Will aim for surgery in December 2021.  ODALYS Neely MD

## 2021-10-07 NOTE — LETTER
10/7/2021       RE: Vincenzo Avery  [address] [place] [address] [address]  [facility] [address]     Dear Colleague,    Thank you for referring your patient, Vincenzo Avery, to the [facility] NEUROSURGERY CLINIC [facility]. Please see a copy of my visit note below.    Vincenzo is a 46 year old who is being evaluated via a billable telephone visit.      What phone number would you like to be contacted at? [phone]  How would you like to obtain your AVS? Mail  Phone call duration: 10 minutes    Discussed management of residual pituitary adenoma. He is interested in corrective surgery for the third nerve palsy on the right side. We believe that additional surgery is feasible through an endonasal, endoscopic approach to remove more right parasellar tumor. Headaches have improved considerably. Will aim for surgery in December 2021.  ODALYS Neely MD

## 2021-11-07 NOTE — PATIENT INSTRUCTIONS
Will place request for redo endonasal, endoscopic resection of pituitary adenoma.    Aim for surgery in December 2021

## 2022-02-18 ENCOUNTER — TELEPHONE (OUTPATIENT)
Dept: NEUROSURGERY | Facility: CLINIC | Age: 47
End: 2022-02-18
Payer: COMMERCIAL

## 2022-02-18 NOTE — TELEPHONE ENCOUNTER
Spoke with Jorden Underwood needing update medical opinion from Dr Neely.    Patient will need to fax new form for completion.  Patient getting  in August 2022, wanting surgery prior to August.    Will refer to Dr Neely.

## 2022-03-16 ENCOUNTER — CARE COORDINATION (OUTPATIENT)
Dept: NEUROSURGERY | Facility: CLINIC | Age: 47
End: 2022-03-16
Payer: COMMERCIAL

## 2022-03-16 ENCOUNTER — TELEPHONE (OUTPATIENT)
Dept: NEUROSURGERY | Facility: CLINIC | Age: 47
End: 2022-03-16
Payer: COMMERCIAL

## 2022-03-16 NOTE — PROGRESS NOTES
Writer faxed completed forms to   [facility]   Attn: José Luis Barry Team   Fax:  [phone]  Phone: [phone]    Writer emailed form to:  [email or web address]    Called patient to inform of the above.   RNCC notified.     Alesia Scales LPN  Neurosurgery

## 2022-03-16 NOTE — TELEPHONE ENCOUNTER
Yasmine calling asking if Welfare form received from Vincenzo.  Form was signed and faxed to our right fax at  last Wed or Thursday by a Welfare office.    Will research and get back to patient.  Dr Neely signature needed.    Voices understanding

## 2022-03-16 NOTE — PROGRESS NOTES
Writer spoke with patient partner Gay to have form re faxed to [phone].     Alesia Scales LPN  Neurosurgery

## 2022-04-01 ENCOUNTER — TELEPHONE (OUTPATIENT)
Dept: NEUROSURGERY | Facility: CLINIC | Age: 47
End: 2022-04-01
Payer: COMMERCIAL

## 2022-04-01 NOTE — TELEPHONE ENCOUNTER
[facility] Call Center    Phone Message    May a detailed message be left on voicemail: yes     Reason for Call: Other:   Gay is calling to speak with Nurse Alesia. She says pt needs an MRI ASAP because he is hallucinating and seeing things. Gay says she does not know if it is related to pt's tumor or if it's something else but she is really concerned.    Gay would like Alesia to give her a call back asap  Ph: [phone]    Action Taken: Message routed to:  [facility] ([facility] Neurosurgery    Travel Screening: Not Applicable

## 2022-04-19 ENCOUNTER — TELEPHONE (OUTPATIENT)
Dept: NEUROSURGERY | Facility: CLINIC | Age: 47
End: 2022-04-19
Payer: COMMERCIAL

## 2022-04-19 DIAGNOSIS — D35.2 PITUITARY MACROADENOMA (H): Primary | ICD-10-CM

## 2022-04-19 NOTE — TELEPHONE ENCOUNTER
"Yasmine calling asking about patient and his symptoms of  1.  Seeing things that don't exist  2.  Hearing things that are not real  3.  States won't go outdoors or leave house most of time.  Yasmine states these symptoms have been going on for the past 6 months.  Yasmine asking if another MRI is needed now.      Yasmine states Vincenzo did not want procedure requested by Dr Neely on 10/7 for redo   From OV 10/7/21  \" He is interested in corrective surgery for the third nerve palsy on the right side. We believe that additional surgery is feasible through an endonasal, endoscopic approach to remove more right parasellar tumor\"    Yasmine will ask Vincenzo if he is ready to schedule now or what is Vincenzo's plan and callback.    Voices understanding.   "

## 2022-04-19 NOTE — TELEPHONE ENCOUNTER
Per Dr Neely, get new MRI Brain with and without contrast, can do near home at [facility],  Faxed Order to  [phone].    Spoke with Yasmine, information given, note sent to Theresa to schedule F/U telephone call with Dr Neely.    Voices understanding.

## 2022-04-20 ENCOUNTER — TELEPHONE (OUTPATIENT)
Dept: NEUROSURGERY | Facility: CLINIC | Age: 47
End: 2022-04-20
Payer: COMMERCIAL

## 2022-05-02 ENCOUNTER — TRANSFERRED RECORDS (OUTPATIENT)
Dept: HEALTH INFORMATION MANAGEMENT | Facility: CLINIC | Age: 47
End: 2022-05-02
Payer: COMMERCIAL

## 2022-05-03 ENCOUNTER — VIRTUAL VISIT (OUTPATIENT)
Dept: NEUROSURGERY | Facility: CLINIC | Age: 47
End: 2022-05-03
Payer: COMMERCIAL

## 2022-05-03 DIAGNOSIS — D35.2 PITUITARY MACROADENOMA (H): Primary | ICD-10-CM

## 2022-05-03 PROCEDURE — 99207 PR NO DOCUMENTATION ON VISIT: CPT | Performed by: NEUROLOGICAL SURGERY

## 2022-05-03 ASSESSMENT — PATIENT HEALTH QUESTIONNAIRE - PHQ9: SUM OF ALL RESPONSES TO PHQ QUESTIONS 1-9: 18

## 2022-05-03 NOTE — LETTER
5/3/2022      RE: Vincenzo Avery   [address] [facility] [address]  [facility] [address]         Surgical headaches much better. Able to open right eye. 200 lbs weight. Anxiety. Feels well. Schedule surgery.  See dictated note.  ODALYS Neely MD    Service Date: 2022    Brice Meneses MD  [facility]  [address] [facility] [address]   [place] [address]    RE:  Vincenzo Avery  MRN:  [id number]  :  1975    Dear Dr. Meneses:    We spoke to Mr. Avery as part of a telephone followup in Pituitary Clinic on 2022 for followup of residual pituitary macroadenoma.  He has undergone 2 resections of this large tumor, the first through an endoscopic endonasal approach and the second through a transcranial approach. Following the second operation, he developed complete *** involving a third nerve palsy on the right side.  This will not improve.  He also had severe postoperative headaches.  He was accompanied by his significant other, Ms. Thompson, on this telephone visit.  We have received reports recently of a mood lability.    He reports considerable improvement in the surgical headaches.  Interestingly, he is apparently able to open the right eye to some degree.  Of course, he has double vision with opening of the eye.  Regarding regarding his mood, he acknowledges much anxiety.  Apparently, there have been trespassers around his home and he has been up at night frequently on surveillance.  Otherwise, he feels well.  He has been more active and his weight is down to about 200 pounds.  As before, his visual acuity is better on the right side.    Over the phone, he seemed to be in good spirits.  He sounded upbeat and his speech, language and phonation were normal.    REVIEW OF STUDIES:  We went over his MRI from 2022 and compared it to his last MRI from 2021.  The residual tumor in the right parasellar space has grown further.  It is a taller now and makes contact with the ophthalmic segment of the right internal  carotid artery.  It encases the cavernous segment. It is also wider and there is increased mass effect on the medial aspect of the anterior temporal lobe.  The intradural component extending towards the interpeduncular cistern has not recurred.  The tumor has been essentially expanded the right parasellar space.  There is encroachment towards the right side of the optic chiasm but no compression.  The intrasellar space remains completely decompressed.    IMPRESSION AND PLAN:  Given his young age and the continued growth of the sizable residual, we believe that additional surgery through an endoscopic endonasal approach is best.  This will be a formidable operation as the tumor extends laterally and is encasing the carotid artery.  In previous surgeries, we have found the tumor to be relatively soft and this will hopefully facilitate  maximum tumor resection.  We discussed the risks of surgery including carotid artery injury and stroke being the most serious.  In addition, we discussed the risks of hypopituitarism, CSF leak, additional cranial nerve injury, need for reoperation, and he agrees to proceed.  We will arrange for surgery later this spring.  We will arrange for him to see Dr. Siegel, our Skull Base Surgery partner from ENT, as well as our Preoperative Anesthesiology Clinic.  We would appreciate you evaluating him and obtaining a new preoperative baseline eye exam.  Please do not hesitate to contact us with questions.  We will keep you informed of his progress.    Sincerely,    Rolf Neely MD        D: 2022   T: 2022   MT: cristian    Name:     NEGRATAMMY TITUSSe  MRN:      [id number]        Account:      [id number]   :      1975           Service Date: 2022       Document: [id number]

## 2022-05-03 NOTE — LETTER
5/3/2022     RE: Vincenzo Avery   [address]  [facility] [address]     Dear Colleague,    Thank you for referring your patient, Vincenzo Avery, to the [facility] NEUROSURGERY CLINIC [place] [facility]. Please see a copy of my visit note below.    Vincenzo is a 47 year old who is being evaluated via a billable telephone visit.      What phone number would you like to be contacted at? [phone]    How would you like to obtain your AVS? Mail a copy  Phone call duration: 15 minutes    Surgical headaches much better. Able to open right eye. 200 lbs weight. Anxiety. Feels well. Schedule surgery.  See dictated note.  ODALYS Neely MD    Service Date: 2022    Brice Meneses MD  [place] [address] [address] [address]   [place] [address]    RE:  Vincenzo Avery  MRN:  [id number]  :  1975    Dear Dr. Meneses:    We spoke to Mr. Avery as part of a telephone followup in Pituitary Clinic on 2022 for followup of residual pituitary macroadenoma.  He has undergone 2 resections of this large tumor, the first through an endoscopic endonasal approach and the second through a transcranial approach. Following the second operation, he developed complete ___ involving a third nerve palsy on the right side.  This will not improve.  He also had severe postoperative headaches.  He was accompanied by his significant other, Ms. Thompson, on this telephone visit.  We have received reports recently of a mood lability.    He reports considerable improvement in the surgical headaches.  Interestingly, he is apparently able to open the right eye to some degree.  Of course, he has double vision with opening of the eye.  Regarding regarding his mood, he acknowledges much anxiety.  Apparently, there have been trespassers around his home and he has been up at night frequently on surveillance.  Otherwise, he feels well.  He has been more active and his weight is down  to about 200 pounds.  As before, his visual acuity is better on the right side.    Over the phone, he seemed to be in good spirits.  He sounded upbeat and his speech, language and phonation were normal.    REVIEW OF STUDIES:  We went over his MRI from 01/25/2022 and compared it to his last MRI from 03/23/2021.  The residual tumor in the right parasellar space has grown further.  It is a taller now and makes contact with the ophthalmic segment of the right internal carotid artery.  It encases the cavernous segment. It is also wider and there is increased mass effect on the medial aspect of the anterior temporal lobe.  The intradural component extending towards the interpeduncular cistern has not recurred.  The tumor has been essentially expanded the right parasellar space.  There is encroachment towards the right side of the optic chiasm but no compression.  The intrasellar space remains completely decompressed.    IMPRESSION AND PLAN:  Given his young age and the continued growth of the sizable residual, we believe that additional surgery through an endoscopic endonasal approach is best.  This will be a formidable operation as the tumor extends laterally and is encasing the carotid artery.  In previous surgeries, we have found the tumor to be relatively soft and this will hopefully facilitate  maximum tumor resection.  We discussed the risks of surgery including carotid artery injury and stroke being the most serious.  In addition, we discussed the risks of hypopituitarism, CSF leak, additional cranial nerve injury, need for reoperation, and he agrees to proceed.  We will arrange for surgery later this spring.  We will arrange for him to see Dr. Siegel, our Skull Base Surgery partner from ENT, as well as our Preoperative Anesthesiology Clinic.  We would appreciate you evaluating him and obtaining a new preoperative baseline eye exam.  Please do not hesitate to contact us with questions.  We will keep you informed  of his progress.    Sincerely,    Rolf Neely MD    D: 2022   T: 2022   MT: cristian  Name:     TAMMY OMER  MRN:      [id number]        Account:      [id number]   :      1975           Service Date: 2022   Document: [id number]

## 2022-05-03 NOTE — PROGRESS NOTES
Vincenzo is a 47 year old who is being evaluated via a billable telephone visit.      What phone number would you like to be contacted at? [phone]    How would you like to obtain your AVS? Mail a copy  Phone call duration: 15 minutes    Surgical headaches much better. Able to open right eye. 200 lbs weight. Anxiety. Feels well. Schedule surgery.  See dictated note.  ODALYS Neely MD

## 2022-05-04 ENCOUNTER — TELEPHONE (OUTPATIENT)
Dept: NEUROSURGERY | Facility: CLINIC | Age: 47
End: 2022-05-04
Payer: COMMERCIAL

## 2022-05-04 DIAGNOSIS — D35.2 PITUITARY MACROADENOMA (H): Primary | ICD-10-CM

## 2022-05-04 NOTE — TELEPHONE ENCOUNTER
Outbound call to Vincenzo Underwood will need from ophthalmologist in [place], Dr. Brice Meneses, for a new baseline eye exam     Left a message on Yasmine's voice mail to please give me a call.      Outbound call to   Dr Brice Vick office:  [facility] [address] [address]  [phone]      Scheduling will call patient to set up appointment with Dr Vick , can connie in next week.

## 2022-05-04 NOTE — PROGRESS NOTES
Service Date: 2022    Brice Meneses MD  [facility]  [address] [address]    RE:  Vincenzo Avery  MRN:  [id number]  :  1975    Dear Dr. Meneses:    We spoke to Mr. Avery as part of a telephone followup in Pituitary Clinic on 2022 for followup of residual pituitary macroadenoma.  He has undergone 2 resections of this large tumor, the first through an endoscopic endonasal approach and the second through a transcranial approach. Following the second operation, he developed complete pupil-involving third nerve palsy on the right side.  This will not improve.  He also had severe postoperative headaches.  He was accompanied by his significant other, Ms. Thompson, on this telephone visit.  We have received reports recently of a mood lability.    He reports considerable improvement in the surgical headaches.  Interestingly, he is apparently able to open the right eye to some degree.  Of course, he has double vision with opening of the eye.  Regarding regarding his mood, he acknowledges much anxiety.  Apparently, there have been trespassers around his home and he has been up at night frequently on surveillance.  Otherwise, he feels well.  He has been more active and his weight is down to about 200 pounds.  As before, his visual acuity is better on the right side.    Over the phone, he seemed to be in good spirits.  He sounded upbeat and his speech, language and phonation were normal.    REVIEW OF STUDIES:  We went over his MRI from 2022 and compared it to his last MRI from 2021.  The residual tumor in the right parasellar space has grown further.  It is a taller now and makes contact with the ophthalmic segment of the right internal carotid artery.  It encases the cavernous segment. It is also wider and there is increased mass effect on the medial aspect of the anterior temporal lobe.  The intradural component extending towards the interpeduncular cistern has  not recurred.  The tumor has been essentially expanded the right parasellar space.  There is encroachment towards the right side of the optic chiasm but no compression.  The intrasellar space remains completely decompressed.    IMPRESSION AND PLAN:  Given his young age and the continued growth of the sizable residual, we believe that additional surgery through an endoscopic endonasal approach is best.  This will be a formidable operation as the tumor extends laterally and is encasing the carotid artery.  In previous surgeries, we have found the tumor to be relatively soft and this will hopefully facilitate  maximum tumor resection.  We discussed the risks of surgery including carotid artery injury and stroke being the most serious.  In addition, we discussed the risks of hypopituitarism, CSF leak, additional cranial nerve injury, need for reoperation, and he agrees to proceed.  We will arrange for surgery later this spring.  We will arrange for him to see Dr. Siegel, our Skull Base Surgery partner from ENT, as well as our Preoperative Anesthesiology Clinic.  We would appreciate you evaluating him and obtaining a new preoperative baseline eye exam.  Please do not hesitate to contact us with questions.  We will keep you informed of his progress.    Sincerely,    Rolf Neely MD        D: 2022   T: 2022   MT: cristian    Name:     TAMMY OMER  MRN:      [id number]        Account:      [id number]   :      1975           Service Date: 2022       Document: [id number]

## 2022-05-05 ENCOUNTER — TELEPHONE (OUTPATIENT)
Dept: NEUROSURGERY | Facility: CLINIC | Age: 47
End: 2022-05-05
Payer: COMMERCIAL

## 2022-05-05 NOTE — TELEPHONE ENCOUNTER
Left message regarding scheduling surgery/procedure with Dr. Neely, Dr. Siegel.  Writer left call back number on the patients voicemail.       Harper Anne on 5/5/2022 at 3:00 PM   P: [phone]

## 2022-05-06 NOTE — TELEPHONE ENCOUNTER
FUTURE VISIT INFORMATION      SURGERY INFORMATION:    Date: 6/2/22    Location: uu or    Surgeon:  Rolf Neely MD CaicedoDk, MD Sudhir Moran, Esequiel Capone MD    Anesthesia Type:  general    Procedure: stealth assisted Redo endoscopic, endonasal approach for resection of recurrent pituitary tumor INSERTION, DRAIN, SPINE, LUMBAR    Consult: virtual visit 5/3    RECORDS REQUESTED FROM:       Most recent EKG+ Tracing: 3/22/21

## 2022-05-06 NOTE — TELEPHONE ENCOUNTER
FUTURE VISIT INFORMATION      FUTURE VISIT INFORMATION:    Date: 5/19/22    Time: 1:40PM    Location: [facility]  REFERRAL INFORMATION:    Referring provider:      Referring providers clinic:      Reason for visit/diagnosis      RECORDS REQUESTED FROM:       Clinic name Comments Records Status Imaging Status   [facility] Neurosurgery [place] 5/3/22 note from Dr Neely  3/22/21 stealth assisted Right frontotemporal craniotomy and resection of tumor [facility] imaging  5/2/22 MR  Brain   3/4/21 MR brain  Scanned in [facility] PACS   Imaging 3/23/21 MR BRain and CT Head   3/22/21 MR Pituitary  [facility] ENT [place] 7/26/18 note from Dr Siegel   6/29/18 Stealth Assisted Endoscopic Transnasal Resection Of Pituitary Tumor EPIC

## 2022-05-10 NOTE — TELEPHONE ENCOUNTER
Patient and Gay called back to schedule surgery with Dr. Neely and Dr. Siegel   Date of Surgery: 06/02/2022  Approximate arrival time given:  Yes  Location of surgery: [facility]  Pre-Op H&P: PAC w/ Dr. Siegel Appt   Post-Op Appt Date: 2 weeks    Imaging needed:  Yes  Scheduled/Discussed COVID-19 Testing: No, but discussed with patient on completing locally     Authorization Completed (Before Scheduling)  No    Patient aware that PAC will give 2-3 days prior to surgery with arrival time and instructions Yes     Packet sent out: Yes 05/10/22    All patients questions were answered and was instructed to review surgical packet and call back with any questions or concerns.       Harper Anne on 5/10/2022 at 2:40 PM

## 2022-05-18 NOTE — PATIENT INSTRUCTIONS
1. Please follow-up in clinic in 06/23/22 1:00 pm   2. Please call the ENT clinic with any questions,concerns, new or worsening symptoms.    -Clinic number is [phone]   - [facility] direct line (Dr. Siegel's nurse) [phone]

## 2022-05-19 ENCOUNTER — ANESTHESIA EVENT (OUTPATIENT)
Dept: SURGERY | Facility: CLINIC | Age: 47
End: 2022-05-19
Payer: COMMERCIAL

## 2022-05-19 ENCOUNTER — OFFICE VISIT (OUTPATIENT)
Dept: OTOLARYNGOLOGY | Facility: CLINIC | Age: 47
End: 2022-05-19
Payer: COMMERCIAL

## 2022-05-19 ENCOUNTER — PRE VISIT (OUTPATIENT)
Dept: OTOLARYNGOLOGY | Facility: CLINIC | Age: 47
End: 2022-05-19
Payer: COMMERCIAL

## 2022-05-19 ENCOUNTER — PRE VISIT (OUTPATIENT)
Dept: SURGERY | Facility: CLINIC | Age: 47
End: 2022-05-19
Payer: COMMERCIAL

## 2022-05-19 VITALS
WEIGHT: 209 LBS | BODY MASS INDEX: 30.96 KG/M2 | DIASTOLIC BLOOD PRESSURE: 82 MMHG | OXYGEN SATURATION: 97 % | HEART RATE: 78 BPM | SYSTOLIC BLOOD PRESSURE: 121 MMHG | TEMPERATURE: 97.4 F | HEIGHT: 69 IN

## 2022-05-19 DIAGNOSIS — D35.2 PITUITARY MACROADENOMA (H): Primary | ICD-10-CM

## 2022-05-19 PROCEDURE — 31231 NASAL ENDOSCOPY DX: CPT | Performed by: OTOLARYNGOLOGY

## 2022-05-19 PROCEDURE — 99203 OFFICE O/P NEW LOW 30 MIN: CPT | Mod: 25 | Performed by: OTOLARYNGOLOGY

## 2022-05-19 ASSESSMENT — PAIN SCALES - GENERAL: PAINLEVEL: NO PAIN (0)

## 2022-05-19 NOTE — LETTER
Date:May 20, 2022      Patient was self referred, no letter generated. Do not send.        [facility] Health Information

## 2022-05-19 NOTE — PROGRESS NOTES
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 Right naso-septal flap placed back into he septum. Right frontotemporal craniotomy with zygomatic osteotomy to resect pituitary tumor on 03/26/2021    History of Present Illness: Patient here for pre-operative discussion regarding his upcoming transnasal endoscopic surgery to remove residual right pituitary adenoma. Currently he denies any sinonasal symptoms.     MEDICATIONS:     Current Outpatient Medications   Medication Sig Dispense Refill     dexamethasone (DECADRON) 1 MG tablet Take 1 tablet (1 mg) by mouth every 8 hours (Patient not taking: Reported on 5/19/2022) 4 tablet 0     HYDROcodone-acetaminophen (NORCO) 5-325 MG tablet Take 1 tablet by mouth every 6 hours as needed for severe pain (Patient not taking: Reported on 5/19/2022) 20 tablet 0     hydrOXYzine (ATARAX) 10 MG tablet Take 1 tablet (10 mg) by mouth 3 times daily as needed for anxiety (Patient not taking: Reported on 5/19/2022) 30 tablet 1     levETIRAcetam (KEPPRA) 750 MG tablet Take 1 tablet (750 mg) by mouth 2 times daily (Patient not taking: Reported on 5/19/2022) 8 tablet 0     ondansetron (ZOFRAN-ODT) 4 MG ODT tab Take 1-2 tablets (4-8 mg) by mouth every 6 hours as needed for nausea or vomiting (Patient not taking: Reported on 5/19/2022) 30 tablet 1     senna-docusate (SENOKOT-S/PERICOLACE) 8.6-50 MG tablet Take 2 tablets by mouth 2 times daily as needed for constipation (Patient not taking: Reported on 5/19/2022) 60 tablet 1       ALLERGIES:    Allergies   Allergen Reactions     Morphine      Patient has significant nausea/vomiting with IV morphine.           PAST MEDICAL HISTORY:   Past Medical History:   Diagnosis Date     History of kidney stones      Migraines      NIC (obstructive sleep apnea)     Does not use CPAP     Pituitary mass (H)         FAMILY HISTORY:    Family History   Problem Relation Age of Onset     Cerebrovascular Disease Mother       Diabetes Mother      Hypertension Mother      Back Pain Father         Degenerative joint/disc disease     Other - See Comments Sister         Injuries sustained in an MVC     Hypertension Brother      Obesity Brother      Cerebrovascular Disease Maternal Grandmother      Breast Cancer Maternal Grandmother      Hypertension Maternal Grandfather      Medical History Unknown Paternal Grandmother      No Known Problems Sister      Medical History Unknown Paternal Grandfather        REVIEW OF SYSTEMS:  12 point ROS was negative other than the symptoms noted above in the HPI.    PHYSICAL EXAMINATION:      Constitutional:  The patient was unaccompanied, well-groomed, and in no acute distress.     Skin: Normal:  warm and pink without rash   Neurologic: Alert and oriented x 3.  CN's IV-XII within normal limits.  Voice normal. Complete right III CN palsy   Psychiatric: The patient's affect was calm, cooperative, and appropriate.     Communication:  Normal; communicates verbally, normal voice quality.   Respiratory: Breathing comfortably without stridor or exertion of accessory muscles.    Head/Face:  Normocephalic and atraumatic.  No lesions or scars. No sinus tenderness.    Salivary glands -  Normal size, no tenderness, swelling, or palpable masses   Eyes: Evidence of Right III CN palsy       Nose: Sinuses were non-tender.  Anterior rhinoscopy revealed midline septum and absence of purulence or polyps.     Oral Cavity: Normal tongue, floor of mouth, buccal mucosa, and palate.  No lesions or masses on inspection or palpation.     Oropharynx: Normal mucosa, palate symmetric with normal elevation. No abnormal lymph tissue in the oropharynx.            Neck: Supple with normal laryngeal and tracheal landmarks.  The parotid beds were without masses.  No palpable thyroid.  Normal range of motion   Lymphatic: There is no palpable lymphadenopathy in the neck.              Nasal Endoscopy:  Consent for nasal endoscopy was  obtained, and we confirmed correctness of procedure and identity of patient.  Nasal endoscopy was indicated due to residual pituitary adenoma.  The nose was topically decongested and anesthetized.  The nasal endoscope was passed under endoscopic vision.  The turbinates were normal.  The inferior and middle meati were clear bilaterally without purulence, masses, or polyps.  The nasopharynx was clear.  The Eustachian tubes were clear.          IMPRESSION AND PLAN: Recurrent pituitary adenoma. Plan for re-do transnasal endoscopic approach, possible fascia valerio graft. All questions answered      Jo-Ann Green MD, M.D.  Otolaryngology- Head & Neck Surgery  [phone]

## 2022-05-19 NOTE — LETTER
5/19/2022       RE: Vincenzo Avery  [address]  [place] [id number]     Dear Colleague,    Thank you for referring your patient, Vincezno Avery, to the [facility] [place] at [facility]. Please see a copy of my visit note below.    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 Right naso-septal flap placed back into he septum. Right frontotemporal craniotomy with zygomatic osteotomy to resect pituitary tumor on 03/26/2021    History of Present Illness: Patient here for pre-operative discussion regarding his upcoming transnasal endoscopic surgery to remove residual right pituitary adenoma. Currently he denies any sinonasal symptoms.     MEDICATIONS:     Current Outpatient Medications   Medication Sig Dispense Refill     dexamethasone (DECADRON) 1 MG tablet Take 1 tablet (1 mg) by mouth every 8 hours (Patient not taking: Reported on 5/19/2022) 4 tablet 0     HYDROcodone-acetaminophen (NORCO) 5-325 MG tablet Take 1 tablet by mouth every 6 hours as needed for severe pain (Patient not taking: Reported on 5/19/2022) 20 tablet 0     hydrOXYzine (ATARAX) 10 MG tablet Take 1 tablet (10 mg) by mouth 3 times daily as needed for anxiety (Patient not taking: Reported on 5/19/2022) 30 tablet 1     levETIRAcetam (KEPPRA) 750 MG tablet Take 1 tablet (750 mg) by mouth 2 times daily (Patient not taking: Reported on 5/19/2022) 8 tablet 0     ondansetron (ZOFRAN-ODT) 4 MG ODT tab Take 1-2 tablets (4-8 mg) by mouth every 6 hours as needed for nausea or vomiting (Patient not taking: Reported on 5/19/2022) 30 tablet 1     senna-docusate (SENOKOT-S/PERICOLACE) 8.6-50 MG tablet Take 2 tablets by mouth 2 times daily as needed for constipation (Patient not taking: Reported on 5/19/2022) 60 tablet 1       ALLERGIES:    Allergies   Allergen Reactions     Morphine      Patient has significant nausea/vomiting with  IV morphine.           PAST MEDICAL HISTORY:   Past Medical History:   Diagnosis Date     History of kidney stones      Migraines      NIC (obstructive sleep apnea)     Does not use CPAP     Pituitary mass (H)         FAMILY HISTORY:    Family History   Problem Relation Age of Onset     Cerebrovascular Disease Mother      Diabetes Mother      Hypertension Mother      Back Pain Father         Degenerative joint/disc disease     Other - See Comments Sister         Injuries sustained in an MVC     Hypertension Brother      Obesity Brother      Cerebrovascular Disease Maternal Grandmother      Breast Cancer Maternal Grandmother      Hypertension Maternal Grandfather      Medical History Unknown Paternal Grandmother      No Known Problems Sister      Medical History Unknown Paternal Grandfather        REVIEW OF SYSTEMS:  12 point ROS was negative other than the symptoms noted above in the HPI.    PHYSICAL EXAMINATION:      Constitutional:  The patient was unaccompanied, well-groomed, and in no acute distress.     Skin: Normal:  warm and pink without rash   Neurologic: Alert and oriented x 3.  CN's IV-XII within normal limits.  Voice normal. Complete right III CN palsy   Psychiatric: The patient's affect was calm, cooperative, and appropriate.     Communication:  Normal; communicates verbally, normal voice quality.   Respiratory: Breathing comfortably without stridor or exertion of accessory muscles.    Head/Face:  Normocephalic and atraumatic.  No lesions or scars. No sinus tenderness.    Salivary glands -  Normal size, no tenderness, swelling, or palpable masses   Eyes: Evidence of Right III CN palsy       Nose: Sinuses were non-tender.  Anterior rhinoscopy revealed midline septum and absence of purulence or polyps.     Oral Cavity: Normal tongue, floor of mouth, buccal mucosa, and palate.  No lesions or masses on inspection or palpation.     Oropharynx: Normal mucosa, palate symmetric with normal elevation. No abnormal  lymph tissue in the oropharynx.            Neck: Supple with normal laryngeal and tracheal landmarks.  The parotid beds were without masses.  No palpable thyroid.  Normal range of motion   Lymphatic: There is no palpable lymphadenopathy in the neck.              Nasal Endoscopy:  Consent for nasal endoscopy was obtained, and we confirmed correctness of procedure and identity of patient.  Nasal endoscopy was indicated due to residual pituitary adenoma.  The nose was topically decongested and anesthetized.  The nasal endoscope was passed under endoscopic vision.  The turbinates were normal.  The inferior and middle meati were clear bilaterally without purulence, masses, or polyps.  The nasopharynx was clear.  The Eustachian tubes were clear.          IMPRESSION AND PLAN: Recurrent pituitary adenoma. Plan for re-do transnasal endoscopic approach, possible fascia valerio graft. All questions answered      Jo-Ann Green MD, M.D.  Otolaryngology- Head & Neck Surgery  [phone]          Again, thank you for allowing me to participate in the care of your patient.      Sincerely,    Jo-Ann Green MD

## 2022-05-19 NOTE — NURSING NOTE
"Chief Complaint   Patient presents with     Consult     Pituitary Macroadenoma, pre-surgical consult, surgery 6/2       Blood pressure 121/82, pulse 78, temperature 97.4  F (36.3  C), temperature source Temporal, height 1.753 m (5' 9\"), weight 94.8 kg (209 lb), SpO2 97 %.    Whit Yu, EMT    "

## 2022-05-23 ENCOUNTER — TELEPHONE (OUTPATIENT)
Dept: SURGERY | Facility: CLINIC | Age: 47
End: 2022-05-23

## 2022-05-23 ENCOUNTER — VIRTUAL VISIT (OUTPATIENT)
Dept: SURGERY | Facility: CLINIC | Age: 47
End: 2022-05-23
Payer: COMMERCIAL

## 2022-05-23 ENCOUNTER — PRE VISIT (OUTPATIENT)
Dept: SURGERY | Facility: CLINIC | Age: 47
End: 2022-05-23
Payer: COMMERCIAL

## 2022-05-23 DIAGNOSIS — Z01.818 PRE-OP EVALUATION: Primary | ICD-10-CM

## 2022-05-23 PROCEDURE — 99214 OFFICE O/P EST MOD 30 MIN: CPT | Mod: 95 | Performed by: PHYSICIAN ASSISTANT

## 2022-05-23 ASSESSMENT — PAIN SCALES - GENERAL: PAINLEVEL: NO PAIN (0)

## 2022-05-23 ASSESSMENT — ENCOUNTER SYMPTOMS: SEIZURES: 0

## 2022-05-23 ASSESSMENT — LIFESTYLE VARIABLES: TOBACCO_USE: 0

## 2022-05-23 NOTE — PATIENT INSTRUCTIONS
Preparing for Your Surgery      Name:  Vincenzo Avery   MRN:  [id number]   :  1975   Today's Date:  2022       Arriving for surgery:  Surgery date:  2022  Arrival time:  5:30 am    Restrictions due to COVID 19       Effective 22 [facility] is implementing the following visitor policy:     1 person may accompany the patient through the Pre-Op process.      That same person may wait in the Surgery Waiting room, provided there is enough room to social distance         Inpatients are allowed 2 visitors per day for the duration of their stay.        Visitors must wear a mask.      Visitors must not be ill.      Visiting hours are 8 am to 8 pm.    Secoo parking is available for anyone with mobility limitations or disabilities.  ([facility]  24 hours/ 7 days a week; [facility]  7 am- 3:30 pm, Mon- Fri)    Please come to:     [facility] [address]  [address] [place] [address]    - ?Secoo parking is available in front of the hospital      -    Please proceed to [address] on the 3rd floor. [phone]?     - ?If you are in need of directions, wheelchair or escort please stop at the Information Desk in the lobby.      What can I eat or drink?  -  You may eat and drink normally for up to 8 hours before your surgery. (Until 22 at 11 pm)  -  You may have clear liquids until 2 hours before surgery. (Until 5:30 am arrival time)    Examples of clear liquids:  Water  Clear broth  Juices (apple, white grape, white cranberry  and cider) without pulp  Noncarbonated, powder based beverages  (lemonade and Ean-Aid)  Sodas (Sprite, 7-Up, ginger ale and seltzer)  Coffee or tea (without milk or cream)  Gatorade    -  No Alcohol for at least 24 hours before surgery     Which medicines can I take?    Hold Aspirin for 7 days before surgery.   Hold Multivitamins for 7 days before surgery.  Hold Supplements for 7 days before surgery.  Hold  Ibuprofen (Advil, Motrin) for 1 day before surgery--unless otherwise directed by surgeon.  Hold Naproxen (Aleve) for 4 days before surgery.      -  PLEASE TAKE these medications the day of surgery:  NONE    How do I prepare myself?  - Please take 2 showers before surgery using Scrubcare or Hibiclens soap.    Use this soap only from the neck to your toes.     Leave the soap on your skin for one minute--then rinse thoroughly.      You may use your own shampoo and conditioner; no other hair products.   - Please remove all jewelry and body piercings.  - No lotions, deodorants or fragrance.  - No makeup or fingernail polish.   - Bring your ID and insurance card.    -If you have a Deep Brain Stimulator, Spinal Cord Stimulator or any neuro stimulator device---you must bring the remote control to the hospital     - All patients are required to have a Covid-19 test within 4 days of surgery/procedure.      -Patients will be contacted by the [facility] scheduling team within 1 week of surgery to make an appointment.      - Patients may call the Scheduling team at [phone] if they have not been scheduled within 4 days of  surgery.          Questions or Concerns:    - For any questions regarding the day of surgery or your hospital stay, please contact the Pre Admission Nursing Office at [phone].       - If you have health changes between today and your surgery please call your surgeon.       For questions after surgery please call your surgeons office.

## 2022-05-23 NOTE — H&P
Pre-Operative H & P     CC:  Preoperative exam to assess for increased cardiopulmonary risk while undergoing surgery and anesthesia.    Date of Encounter: 5/23/2022  Primary Care Physician:  No Ref-Primary, Physician     Reason for visit:   Encounter Diagnosis   Name Primary?     Pre-op evaluation Yes       HPI  Vincenzo Avery is a 47 year old male who presents for pre-operative H & P in preparation for  Procedure Information     Case: [id number] Date/Time: 06/02/22 0730    Procedures:       stealth assisted Redo endoscopic, endonasal approach for resection of recurrent pituitary tumor (N/A Head)      INSERTION, DRAIN, SPINE, LUMBAR (N/A Spine)    Anesthesia type: General    Diagnosis: Pituitary macroadenoma (H) [D35.2]    Pre-op diagnosis: Pituitary macroadenoma (H) [D35.2]    [facility]    Providers: Rolf Neely MD          Patient is being evaluated for comorbid conditions of NIC, anemia    Mr. Avery has a history of a pituitary ademona s/p excision 6/2018 and 3/2021. He follows with Dr. Jain. He now has continued growth of the residual and is scheduled for the above procedure.     History is obtained from the patient and chart review    Hx of abnormal bleeding or anti-platelet use: denies      Past Medical History  Past Medical History:   Diagnosis Date     History of kidney stones      Migraines      NIC (obstructive sleep apnea)     Does not use CPAP     Pituitary mass (H)        Past Surgical History  Past Surgical History:   Procedure Laterality Date     DENTAL SURGERY      [place] teeth extraction     HAND SURGERY Right     Repair of fracture     INSERT DRAIN LUMBAR N/A 3/22/2021    Procedure: INSERTION, DRAIN, SPINE, LUMBAR;  Surgeon: Rolf Neely MD;  [facility]     OPTICAL TRACKING SYSTEM ENDOSCOPIC RESECTION TUMOR CRANIAL N/A 6/29/2018    Procedure: OPTICAL TRACKING SYSTEM ENDOSCOPIC RESECTION TUMOR CRANIAL;  Stealth Assisted Endoscopic  Transnasal Resection Of Pituitary Tumor;  Surgeon: Jo-Ann Green MD;  [facility]     OPTICAL TRACKING SYSTEM ENDOSCOPIC RESECTION TUMOR CRANIAL Right 3/22/2021    Procedure: stealth assisted Right frontotemporal craniotomy and resection of tumor;  Surgeon: Rolf Neely MD;  [facility]     ORCHIECTOMY SCROTAL Right        Prior to Admission Medications  Current Outpatient Medications   Medication Sig Dispense Refill     dexamethasone (DECADRON) 1 MG tablet Take 1 tablet (1 mg) by mouth every 8 hours (Patient not taking: Reported on 5/19/2022) 4 tablet 0     HYDROcodone-acetaminophen (NORCO) 5-325 MG tablet Take 1 tablet by mouth every 6 hours as needed for severe pain (Patient not taking: Reported on 5/19/2022) 20 tablet 0     hydrOXYzine (ATARAX) 10 MG tablet Take 1 tablet (10 mg) by mouth 3 times daily as needed for anxiety (Patient not taking: Reported on 5/19/2022) 30 tablet 1     levETIRAcetam (KEPPRA) 750 MG tablet Take 1 tablet (750 mg) by mouth 2 times daily (Patient not taking: Reported on 5/19/2022) 8 tablet 0     ondansetron (ZOFRAN-ODT) 4 MG ODT tab Take 1-2 tablets (4-8 mg) by mouth every 6 hours as needed for nausea or vomiting (Patient not taking: Reported on 5/19/2022) 30 tablet 1     senna-docusate (SENOKOT-S/PERICOLACE) 8.6-50 MG tablet Take 2 tablets by mouth 2 times daily as needed for constipation (Patient not taking: Reported on 5/19/2022) 60 tablet 1       Allergies  Allergies   Allergen Reactions     Morphine      Patient has significant nausea/vomiting with IV morphine.         Social History  Social History     Socioeconomic History     Marital status: Single     Spouse name: Not on file     Number of children: Not on file     Years of education: Not on file     Highest education level: Not on file   Occupational History     Not on file   Tobacco Use     Smoking status: Never Smoker     Smokeless tobacco: Never Used   Substance and Sexual Activity      Alcohol use: Yes     Comment: Rarely     Drug use: Yes     Types: Marijuana     Comment: Daily marijuana use for pain/headaches     Sexual activity: Not on file   Other Topics Concern     Not on file   Social History Narrative     Not on file     Social Determinants of Health     Financial Resource Strain: Not on file   Food Insecurity: Not on file   Transportation Needs: Not on file   Physical Activity: Not on file   Stress: Not on file   Social Connections: Not on file   Intimate Partner Violence: Not on file   Housing Stability: Not on file       Family History  Family History   Problem Relation Age of Onset     Cerebrovascular Disease Mother      Diabetes Mother      Hypertension Mother      Back Pain Father         Degenerative joint/disc disease     Other - See Comments Sister         Injuries sustained in an MVC     No Known Problems Sister      Hypertension Brother      Obesity Brother      Cerebrovascular Disease Maternal Grandmother      Breast Cancer Maternal Grandmother      Hypertension Maternal Grandfather      Medical History Unknown Paternal Grandmother      Medical History Unknown Paternal Grandfather      Anesthesia Reaction No family hx of      Deep Vein Thrombosis (DVT) No family hx of        Review of Systems  The complete review of systems is negative other than noted in the HPI or here.   Anesthesia Evaluation   Pt has had prior anesthetic.         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  HENT: Normocephalic  Respiratory: non labored breathing   Neurologic: Awake, alert, oriented to name, place and time.   Neuropsychiatric: Calm, cooperative. Normal affect.      Prior Labs/Diagnostic Studies   All labs and imaging personally reviewed     EKG/ stress test - if available please see in ROS above   No results found.  No flowsheet data found.      The patient's records and results personally reviewed by this provider.     Outside records reviewed from: Care Everywhere      Assessment      Vincenzo Avery is a 47 year old male seen as a PAC referral for risk assessment and optimization for anesthesia.    Plan/Recommendations  Pt will be optimized for the proposed procedure.  See below for details on the assessment, risk, and preoperative recommendations    NEUROLOGY  - No history of TIA, CVA or seizure  -pituitary adenoma with the above procedure planned   -Post Op delirium risk factors:  No risk identified    ENT  - No current airway concerns.  Will need to be reassessed day of surgery.  Mallampati: Unable to assess  TM: Unable to assess    CARDIAC  - No history of CAD, Hypertension and Afib   -denies cardiac history or symptoms   - METS (Metabolic Equivalents)   Patient performs 4 or more METS exercise without symptoms            Total Score: 0      RCRI-Low risk: Class 2 0.9% complication rate            Total Score: 1    RCRI: High Risk Surgery        PULMONARY  NIC Low Risk            Total Score: 2    NIC: Snores  "loudly    NIC: Male    -reports he believes he has NIC, but has never been tested.   - Denies asthma or inhaler use  - Tobacco History      History   Smoking Status     Never Smoker   Smokeless Tobacco     Never Used       GI  PONV Medium Risk  Total Score: 2           1 AN PONV: Patient is not a current smoker    1 AN PONV: Intended Post Op Opioids        /RENAL  - Baseline Creatinine  WNL    ENDOCRINE    - BMI: Estimated body mass index is 30.86 kg/m  as calculated from the following:    Height as of 5/19/22: 1.753 m (5' 9\").    Weight as of 5/19/22: 94.8 kg (209 lb).  Obesity (BMI >30)  - No history of Diabetes Mellitus    HEME  VTE Low Risk 0.5%            Total Score: 2    VTE: Male      - No history of abnormal bleeding or antiplatelet use.  -hgb was 9.6 3/26/22    The patient is optimized for their procedure. AVS with information on surgery time/arrival time, meds and NPO status given by nursing staff. No further diagnostic testing indicated.    CBC with iron reflex, BMP, T&S faxed to [facility] in [place]. I will watch for faxed results     Please refer to the physical examination documented by the anesthesiologist in the anesthesia record on the day of surgery.    Video-Visit Details    Type of service:  Video Visit    Patient verbally consented to video service today: YES    Video Start Time: 1236  Video End Time (time video stopped): 1250    Originating Location (pt. Location):parked car    Distant Location (provider location): home    Mode of Communication:  Video Conference via Doximity  On the day of service:     Prep time: 11 minutes  Visit time: 14 minutes  Documentation time: 9 minutes  ------------------------------------------  Total time: 34 minutes      Yessica Zamudio PA-C  Preoperative Assessment Center  [facility]  [facility]  Phone: [phone]  Fax: [phone]  "

## 2022-05-23 NOTE — TELEPHONE ENCOUNTER
[facility] Call Center    Phone Message    May a detailed message be left on voicemail: yes     Reason for Call: Other: Per Rn from [facility] would like to know if our clinic have any other way to contact pt. Per Rn tired reaching out to pt to schedule for the orders that was sent to them but none of the numbers work. Please call [facility] back if we are or arent able to find a way to conact the pt. Thank you!     Please call [facility] at  and ask for medical records.     Action Taken: Message routed to:  [facility] ([facility]): PAC    Travel Screening: Not Applicable

## 2022-05-23 NOTE — H&P (VIEW-ONLY)
Pre-Operative H & P     CC:  Preoperative exam to assess for increased cardiopulmonary risk while undergoing surgery and anesthesia.    Date of Encounter: 5/23/2022  Primary Care Physician:  No Ref-Primary, Physician     Reason for visit:   Encounter Diagnosis   Name Primary?     Pre-op evaluation Yes       HPI  Vincenzo Avery is a 47 year old male who presents for pre-operative H & P in preparation for  Procedure Information     Case: [id number] Date/Time: 06/02/22 0730    Procedures:       stealth assisted Redo endoscopic, endonasal approach for resection of recurrent pituitary tumor (N/A Head)      INSERTION, DRAIN, SPINE, LUMBAR (N/A Spine)    Anesthesia type: General    Diagnosis: Pituitary macroadenoma (H) [D35.2]    Pre-op diagnosis: Pituitary macroadenoma (H) [D35.2]    [facility]    Providers: Rolf Neely MD          Patient is being evaluated for comorbid conditions of NIC, anemia    Mr. Avery has a history of a pituitary ademona s/p excision 6/2018 and 3/2021. He follows with Dr. Jain. He now has continued growth of the residual and is scheduled for the above procedure.     History is obtained from the patient and chart review    Hx of abnormal bleeding or anti-platelet use: denies      Past Medical History  Past Medical History:   Diagnosis Date     History of kidney stones      Migraines      NIC (obstructive sleep apnea)     Does not use CPAP     Pituitary mass (H)        Past Surgical History  Past Surgical History:   Procedure Laterality Date     DENTAL SURGERY      [place] teeth extraction     HAND SURGERY Right     Repair of fracture     INSERT DRAIN LUMBAR N/A 3/22/2021    Procedure: INSERTION, DRAIN, SPINE, LUMBAR;  Surgeon: Rolf Neely MD;  [facility]     OPTICAL TRACKING SYSTEM ENDOSCOPIC RESECTION TUMOR CRANIAL N/A 6/29/2018    Procedure: OPTICAL TRACKING SYSTEM ENDOSCOPIC RESECTION TUMOR CRANIAL;  Stealth Assisted Endoscopic  Transnasal Resection Of Pituitary Tumor;  Surgeon: Jo-Ann Green MD;  [facility]     OPTICAL TRACKING SYSTEM ENDOSCOPIC RESECTION TUMOR CRANIAL Right 3/22/2021    Procedure: stealth assisted Right frontotemporal craniotomy and resection of tumor;  Surgeon: Rolf Neely MD;  [facility]     ORCHIECTOMY SCROTAL Right        Prior to Admission Medications  Current Outpatient Medications   Medication Sig Dispense Refill     dexamethasone (DECADRON) 1 MG tablet Take 1 tablet (1 mg) by mouth every 8 hours (Patient not taking: Reported on 5/19/2022) 4 tablet 0     HYDROcodone-acetaminophen (NORCO) 5-325 MG tablet Take 1 tablet by mouth every 6 hours as needed for severe pain (Patient not taking: Reported on 5/19/2022) 20 tablet 0     hydrOXYzine (ATARAX) 10 MG tablet Take 1 tablet (10 mg) by mouth 3 times daily as needed for anxiety (Patient not taking: Reported on 5/19/2022) 30 tablet 1     levETIRAcetam (KEPPRA) 750 MG tablet Take 1 tablet (750 mg) by mouth 2 times daily (Patient not taking: Reported on 5/19/2022) 8 tablet 0     ondansetron (ZOFRAN-ODT) 4 MG ODT tab Take 1-2 tablets (4-8 mg) by mouth every 6 hours as needed for nausea or vomiting (Patient not taking: Reported on 5/19/2022) 30 tablet 1     senna-docusate (SENOKOT-S/PERICOLACE) 8.6-50 MG tablet Take 2 tablets by mouth 2 times daily as needed for constipation (Patient not taking: Reported on 5/19/2022) 60 tablet 1       Allergies  Allergies   Allergen Reactions     Morphine      Patient has significant nausea/vomiting with IV morphine.         Social History  Social History     Socioeconomic History     Marital status: Single     Spouse name: Not on file     Number of children: Not on file     Years of education: Not on file     Highest education level: Not on file   Occupational History     Not on file   Tobacco Use     Smoking status: Never Smoker     Smokeless tobacco: Never Used   Substance and Sexual Activity      Alcohol use: Yes     Comment: Rarely     Drug use: Yes     Types: Marijuana     Comment: Daily marijuana use for pain/headaches     Sexual activity: Not on file   Other Topics Concern     Not on file   Social History Narrative     Not on file     Social Determinants of Health     Financial Resource Strain: Not on file   Food Insecurity: Not on file   Transportation Needs: Not on file   Physical Activity: Not on file   Stress: Not on file   Social Connections: Not on file   Intimate Partner Violence: Not on file   Housing Stability: Not on file       Family History  Family History   Problem Relation Age of Onset     Cerebrovascular Disease Mother      Diabetes Mother      Hypertension Mother      Back Pain Father         Degenerative joint/disc disease     Other - See Comments Sister         Injuries sustained in an MVC     No Known Problems Sister      Hypertension Brother      Obesity Brother      Cerebrovascular Disease Maternal Grandmother      Breast Cancer Maternal Grandmother      Hypertension Maternal Grandfather      Medical History Unknown Paternal Grandmother      Medical History Unknown Paternal Grandfather      Anesthesia Reaction No family hx of      Deep Vein Thrombosis (DVT) No family hx of        Review of Systems  The complete review of systems is negative other than noted in the HPI or here.   Anesthesia Evaluation   Pt has had prior anesthetic.         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  HENT: Normocephalic  Respiratory: non labored breathing   Neurologic: Awake, alert, oriented to name, place and time.   Neuropsychiatric: Calm, cooperative. Normal affect.      Prior Labs/Diagnostic Studies   All labs and imaging personally reviewed     EKG/ stress test - if available please see in ROS above   No results found.  No flowsheet data found.      The patient's records and results personally reviewed by this provider.     Outside records reviewed from: Care Everywhere      Assessment      Vincenzo Avery is a 47 year old male seen as a PAC referral for risk assessment and optimization for anesthesia.    Plan/Recommendations  Pt will be optimized for the proposed procedure.  See below for details on the assessment, risk, and preoperative recommendations    NEUROLOGY  - No history of TIA, CVA or seizure  -pituitary adenoma with the above procedure planned   -Post Op delirium risk factors:  No risk identified    ENT  - No current airway concerns.  Will need to be reassessed day of surgery.  Mallampati: Unable to assess  TM: Unable to assess    CARDIAC  - No history of CAD, Hypertension and Afib   -denies cardiac history or symptoms   - METS (Metabolic Equivalents)   Patient performs 4 or more METS exercise without symptoms            Total Score: 0      RCRI-Low risk: Class 2 0.9% complication rate            Total Score: 1    RCRI: High Risk Surgery        PULMONARY  NIC Low Risk            Total Score: 2    NIC: Snores  "loudly    NIC: Male    -reports he believes he has NIC, but has never been tested.   - Denies asthma or inhaler use  - Tobacco History      History   Smoking Status     Never Smoker   Smokeless Tobacco     Never Used       GI  PONV Medium Risk  Total Score: 2           1 AN PONV: Patient is not a current smoker    1 AN PONV: Intended Post Op Opioids        /RENAL  - Baseline Creatinine  WNL    ENDOCRINE    - BMI: Estimated body mass index is 30.86 kg/m  as calculated from the following:    Height as of 5/19/22: 1.753 m (5' 9\").    Weight as of 5/19/22: 94.8 kg (209 lb).  Obesity (BMI >30)  - No history of Diabetes Mellitus    HEME  VTE Low Risk 0.5%            Total Score: 2    VTE: Male      - No history of abnormal bleeding or antiplatelet use.  -hgb was 9.6 3/26/22    The patient is optimized for their procedure. AVS with information on surgery time/arrival time, meds and NPO status given by nursing staff. No further diagnostic testing indicated.    CBC with iron reflex, BMP, T&S faxed to [facility] in [place]. I will watch for faxed results     Please refer to the physical examination documented by the anesthesiologist in the anesthesia record on the day of surgery.    Video-Visit Details    Type of service:  Video Visit    Patient verbally consented to video service today: YES    Video Start Time: 1236  Video End Time (time video stopped): 1250    Originating Location (pt. Location):parked car    Distant Location (provider location): home    Mode of Communication:  Video Conference via Doximity  On the day of service:     Prep time: 11 minutes  Visit time: 14 minutes  Documentation time: 9 minutes  ------------------------------------------  Total time: 34 minutes      Yessica Zamudio PA-C  Preoperative Assessment Center  [facility]  Clinic [facility] Surgery Center  Phone: [phone]  Fax: [phone]  "

## 2022-05-23 NOTE — PROGRESS NOTES
Vincenzo is a 47 year old who is being evaluated via a billable video visit.      How would you like to obtain your AVS? [email or web address]  If the video visit is dropped, the invitation should be resent by: Text to cell phone: [phone]       HPI         Review of Systems         Objective    Vitals - Patient Reported  Pain Score: No Pain (0)        Physical Exam

## 2022-05-24 NOTE — TELEPHONE ENCOUNTER
Spoke with Vincenzo's SO, Gay, to update her of upcoming lab appointment.  Vincenzo's lab orders were confirmed as received by the  at [facility] (phone: [phone]).  The lab is walk in; Gay stated they plan to have Vincenzo's labs drawn on Tuesday, May 31st mid morning.  Will follow up.  Rachel Cardoza RN

## 2022-05-25 DIAGNOSIS — Z11.59 ENCOUNTER FOR SCREENING FOR OTHER VIRAL DISEASES: Primary | ICD-10-CM

## 2022-05-31 ENCOUNTER — TRANSFERRED RECORDS (OUTPATIENT)
Dept: HEALTH INFORMATION MANAGEMENT | Facility: CLINIC | Age: 47
End: 2022-05-31
Payer: COMMERCIAL

## 2022-05-31 LAB
CREATININE (EXTERNAL): 1 MG/DL (ref 0.66–1.25)
GLUCOSE (EXTERNAL): 86 MG/DL (ref 65–105)
POTASSIUM (EXTERNAL): 3.7 MMOL/L (ref 3.6–5.2)

## 2022-06-02 ENCOUNTER — APPOINTMENT (OUTPATIENT)
Dept: CT IMAGING | Facility: CLINIC | Age: 47
End: 2022-06-02
Attending: STUDENT IN AN ORGANIZED HEALTH CARE EDUCATION/TRAINING PROGRAM
Payer: COMMERCIAL

## 2022-06-02 ENCOUNTER — HOSPITAL ENCOUNTER (OUTPATIENT)
Dept: CT IMAGING | Facility: CLINIC | Age: 47
Discharge: HOME | End: 2022-06-02
Attending: NEUROLOGICAL SURGERY | Admitting: NEUROLOGICAL SURGERY
Payer: COMMERCIAL

## 2022-06-02 ENCOUNTER — HOSPITAL ENCOUNTER (INPATIENT)
Facility: CLINIC | Age: 47
LOS: 2 days | Discharge: HOME | End: 2022-06-04
Attending: NEUROLOGICAL SURGERY | Admitting: NEUROLOGICAL SURGERY
Payer: COMMERCIAL

## 2022-06-02 ENCOUNTER — ANESTHESIA (OUTPATIENT)
Dept: SURGERY | Facility: CLINIC | Age: 47
End: 2022-06-02
Payer: COMMERCIAL

## 2022-06-02 ENCOUNTER — HOSPITAL ENCOUNTER (OUTPATIENT)
Dept: MRI IMAGING | Facility: CLINIC | Age: 47
Discharge: HOME | End: 2022-06-02
Attending: NEUROLOGICAL SURGERY | Admitting: NEUROLOGICAL SURGERY
Payer: COMMERCIAL

## 2022-06-02 DIAGNOSIS — E23.6 PITUITARY MASS (H): Primary | ICD-10-CM

## 2022-06-02 DIAGNOSIS — D35.2 PITUITARY MACROADENOMA (H): ICD-10-CM

## 2022-06-02 PROBLEM — D49.7 PITUITARY TUMOR: Status: ACTIVE | Noted: 2022-06-02

## 2022-06-02 LAB
ABO/RH(D): NORMAL
ANTIBODY SCREEN: NEGATIVE
BASE EXCESS BLDA CALC-SCNC: -0.1 MMOL/L (ref -9.6–2)
BASE EXCESS BLDA CALC-SCNC: -0.5 MMOL/L (ref -9.6–2)
BASE EXCESS BLDA CALC-SCNC: -0.5 MMOL/L (ref -9.6–2)
BASE EXCESS BLDA CALC-SCNC: -0.9 MMOL/L (ref -9.6–2)
BASE EXCESS BLDA CALC-SCNC: -1 MMOL/L (ref -9.6–2)
BLD PROD TYP BPU: NORMAL
BLD PROD TYP BPU: NORMAL
BLOOD COMPONENT TYPE: NORMAL
BLOOD COMPONENT TYPE: NORMAL
CA-I BLD-MCNC: 4.1 MG/DL (ref 4.4–5.2)
CA-I BLD-MCNC: 4.5 MG/DL (ref 4.4–5.2)
CA-I BLD-MCNC: 4.7 MG/DL (ref 4.4–5.2)
CA-I BLD-MCNC: 4.7 MG/DL (ref 4.4–5.2)
CA-I BLD-MCNC: 4.9 MG/DL (ref 4.4–5.2)
CODING SYSTEM: NORMAL
CODING SYSTEM: NORMAL
CROSSMATCH: NORMAL
CROSSMATCH: NORMAL
ERYTHROCYTE [DISTWIDTH] IN BLOOD BY AUTOMATED COUNT: 13.6 % (ref 10–15)
GLUCOSE BLD-MCNC: 129 MG/DL (ref 70–99)
GLUCOSE BLD-MCNC: 138 MG/DL (ref 70–99)
GLUCOSE BLD-MCNC: 139 MG/DL (ref 70–99)
GLUCOSE BLD-MCNC: 141 MG/DL (ref 70–99)
GLUCOSE BLD-MCNC: 98 MG/DL (ref 70–99)
GLUCOSE BLDC GLUCOMTR-MCNC: 135 MG/DL (ref 70–99)
GLUCOSE BLDC GLUCOMTR-MCNC: 139 MG/DL (ref 70–99)
HCO3 BLDA-SCNC: 24 MMOL/L (ref 21–28)
HCO3 BLDA-SCNC: 25 MMOL/L (ref 21–28)
HCO3 BLDA-SCNC: 25 MMOL/L (ref 21–28)
HCT VFR BLD AUTO: 30.1 % (ref 40–53)
HGB BLD-MCNC: 10.1 G/DL (ref 13.3–17.7)
HGB BLD-MCNC: 10.2 G/DL (ref 13.3–17.7)
HGB BLD-MCNC: 11 G/DL (ref 13.3–17.7)
HGB BLD-MCNC: 11.5 G/DL (ref 13.3–17.7)
HGB BLD-MCNC: 11.9 G/DL (ref 13.3–17.7)
HGB BLD-MCNC: 9.1 G/DL (ref 13.3–17.7)
ISSUE DATE AND TIME: NORMAL
ISSUE DATE AND TIME: NORMAL
LACTATE BLD-SCNC: 1.3 MMOL/L
LACTATE BLD-SCNC: 2.6 MMOL/L
LACTATE BLD-SCNC: 2.7 MMOL/L
LACTATE BLD-SCNC: 2.7 MMOL/L
LACTATE BLD-SCNC: 3.2 MMOL/L
MCH RBC QN AUTO: 30 PG (ref 26.5–33)
MCHC RBC AUTO-ENTMCNC: 33.6 G/DL (ref 31.5–36.5)
MCV RBC AUTO: 89 FL (ref 78–100)
O2/TOTAL GAS SETTING VFR VENT: 50 %
PCO2 BLDA: 38 MM HG (ref 35–45)
PCO2 BLDA: 39 MM HG (ref 35–45)
PCO2 BLDA: 40 MM HG (ref 35–45)
PCO2 BLDA: 40 MM HG (ref 35–45)
PCO2 BLDA: 44 MM HG (ref 35–45)
PH BLDA: 7.36 [PH] (ref 7.35–7.45)
PH BLDA: 7.39 [PH] (ref 7.35–7.45)
PH BLDA: 7.39 [PH] (ref 7.35–7.45)
PH BLDA: 7.4 [PH] (ref 7.35–7.45)
PH BLDA: 7.4 [PH] (ref 7.35–7.45)
PLATELET # BLD AUTO: 182 10E3/UL (ref 150–450)
PO2 BLDA: 110 MM HG (ref 80–105)
PO2 BLDA: 118 MM HG (ref 80–105)
PO2 BLDA: 145 MM HG (ref 80–105)
PO2 BLDA: 93 MM HG (ref 80–105)
PO2 BLDA: 95 MM HG (ref 80–105)
POTASSIUM BLD-SCNC: 4 MMOL/L (ref 3.5–5)
POTASSIUM BLD-SCNC: 4.3 MMOL/L (ref 3.5–5)
POTASSIUM BLD-SCNC: 4.3 MMOL/L (ref 3.5–5)
POTASSIUM BLD-SCNC: 4.4 MMOL/L (ref 3.5–5)
POTASSIUM BLD-SCNC: 4.6 MMOL/L (ref 3.5–5)
RBC # BLD AUTO: 3.37 10E6/UL (ref 4.4–5.9)
SODIUM BLD-SCNC: 140 MMOL/L (ref 133–144)
SODIUM BLD-SCNC: 141 MMOL/L (ref 133–144)
SODIUM BLD-SCNC: 142 MMOL/L (ref 133–144)
SPECIMEN EXPIRATION DATE: NORMAL
UNIT ABO/RH: NORMAL
UNIT ABO/RH: NORMAL
UNIT NUMBER: NORMAL
UNIT NUMBER: NORMAL
UNIT STATUS: NORMAL
UNIT STATUS: NORMAL
UNIT TYPE ISBT: 5100
UNIT TYPE ISBT: 5100
WBC # BLD AUTO: 6.8 10E3/UL (ref 4–11)

## 2022-06-02 PROCEDURE — 70552 MRI BRAIN STEM W/DYE: CPT

## 2022-06-02 PROCEDURE — 250N000009 HC RX 250: Performed by: NURSE ANESTHETIST, CERTIFIED REGISTERED

## 2022-06-02 PROCEDURE — 70498 CT ANGIOGRAPHY NECK: CPT

## 2022-06-02 PROCEDURE — 8E09XBZ COMPUTER ASSISTED PROCEDURE OF HEAD AND NECK REGION: ICD-10-PCS | Performed by: NEUROLOGICAL SURGERY

## 2022-06-02 PROCEDURE — 250N000011 HC RX IP 250 OP 636: Performed by: ANESTHESIOLOGY

## 2022-06-02 PROCEDURE — 70552 MRI BRAIN STEM W/DYE: CPT | Mod: 26 | Performed by: RADIOLOGY

## 2022-06-02 PROCEDURE — 370N000017 HC ANESTHESIA TECHNICAL FEE, PER MIN: Performed by: NEUROLOGICAL SURGERY

## 2022-06-02 PROCEDURE — 31299 UNLISTED PX ACCESSORY SINUS: CPT | Mod: GC | Performed by: OTOLARYNGOLOGY

## 2022-06-02 PROCEDURE — 88341 IMHCHEM/IMCYTCHM EA ADD ANTB: CPT | Mod: TC | Performed by: NEUROLOGICAL SURGERY

## 2022-06-02 PROCEDURE — 710N000009 HC RECOVERY PHASE 1, LEVEL 1, PER MIN: Performed by: NEUROLOGICAL SURGERY

## 2022-06-02 PROCEDURE — 4A10X4G MONITORING OF CENTRAL NERVOUS ELECTRICAL ACTIVITY, INTRAOPERATIVE, EXTERNAL APPROACH: ICD-10-PCS | Performed by: NEUROLOGICAL SURGERY

## 2022-06-02 PROCEDURE — 250N000009 HC RX 250: Performed by: NEUROLOGICAL SURGERY

## 2022-06-02 PROCEDURE — 70496 CT ANGIOGRAPHY HEAD: CPT | Mod: 26 | Performed by: RADIOLOGY

## 2022-06-02 PROCEDURE — 82803 BLOOD GASES ANY COMBINATION: CPT

## 2022-06-02 PROCEDURE — 250N000011 HC RX IP 250 OP 636: Performed by: STUDENT IN AN ORGANIZED HEALTH CARE EDUCATION/TRAINING PROGRAM

## 2022-06-02 PROCEDURE — 250N000011 HC RX IP 250 OP 636: Performed by: NURSE ANESTHETIST, CERTIFIED REGISTERED

## 2022-06-02 PROCEDURE — 82330 ASSAY OF CALCIUM: CPT

## 2022-06-02 PROCEDURE — P9016 RBC LEUKOCYTES REDUCED: HCPCS | Performed by: ANESTHESIOLOGY

## 2022-06-02 PROCEDURE — A9585 GADOBUTROL INJECTION: HCPCS | Performed by: NEUROLOGICAL SURGERY

## 2022-06-02 PROCEDURE — 255N000002 HC RX 255 OP 636: Performed by: NEUROLOGICAL SURGERY

## 2022-06-02 PROCEDURE — 250N000009 HC RX 250: Performed by: ANESTHESIOLOGY

## 2022-06-02 PROCEDURE — 250N000025 HC SEVOFLURANE, PER MIN: Performed by: NEUROLOGICAL SURGERY

## 2022-06-02 PROCEDURE — 258N000003 HC RX IP 258 OP 636: Performed by: NURSE ANESTHETIST, CERTIFIED REGISTERED

## 2022-06-02 PROCEDURE — 86850 RBC ANTIBODY SCREEN: CPT | Performed by: STUDENT IN AN ORGANIZED HEALTH CARE EDUCATION/TRAINING PROGRAM

## 2022-06-02 PROCEDURE — 258N000003 HC RX IP 258 OP 636

## 2022-06-02 PROCEDURE — 999N000141 HC STATISTIC PRE-PROCEDURE NURSING ASSESSMENT: Performed by: NEUROLOGICAL SURGERY

## 2022-06-02 PROCEDURE — 36415 COLL VENOUS BLD VENIPUNCTURE: CPT | Performed by: STUDENT IN AN ORGANIZED HEALTH CARE EDUCATION/TRAINING PROGRAM

## 2022-06-02 PROCEDURE — 70498 CT ANGIOGRAPHY NECK: CPT | Mod: 26 | Performed by: RADIOLOGY

## 2022-06-02 PROCEDURE — P9041 ALBUMIN (HUMAN),5%, 50ML: HCPCS | Performed by: NURSE ANESTHETIST, CERTIFIED REGISTERED

## 2022-06-02 PROCEDURE — 84132 ASSAY OF SERUM POTASSIUM: CPT

## 2022-06-02 PROCEDURE — 70450 CT HEAD/BRAIN W/O DYE: CPT

## 2022-06-02 PROCEDURE — 360N000079 HC SURGERY LEVEL 6, PER MIN: Performed by: NEUROLOGICAL SURGERY

## 2022-06-02 PROCEDURE — 86923 COMPATIBILITY TEST ELECTRIC: CPT | Performed by: ANESTHESIOLOGY

## 2022-06-02 PROCEDURE — 272N000002 HC OR SUPPLY OTHER OPNP: Performed by: NEUROLOGICAL SURGERY

## 2022-06-02 PROCEDURE — 05BL0ZZ EXCISION OF INTRACRANIAL VEIN, OPEN APPROACH: ICD-10-PCS | Performed by: NEUROLOGICAL SURGERY

## 2022-06-02 PROCEDURE — 88307 TISSUE EXAM BY PATHOLOGIST: CPT | Mod: TC | Performed by: NEUROLOGICAL SURGERY

## 2022-06-02 PROCEDURE — 272N000001 HC OR GENERAL SUPPLY STERILE: Performed by: NEUROLOGICAL SURGERY

## 2022-06-02 PROCEDURE — 70496 CT ANGIOGRAPHY HEAD: CPT

## 2022-06-02 PROCEDURE — 85027 COMPLETE CBC AUTOMATED: CPT | Performed by: STUDENT IN AN ORGANIZED HEALTH CARE EDUCATION/TRAINING PROGRAM

## 2022-06-02 PROCEDURE — 120N000002 HC R&B MED SURG/OB UMMC

## 2022-06-02 PROCEDURE — 250N000011 HC RX IP 250 OP 636: Performed by: NEUROLOGICAL SURGERY

## 2022-06-02 PROCEDURE — 86901 BLOOD TYPING SEROLOGIC RH(D): CPT | Performed by: STUDENT IN AN ORGANIZED HEALTH CARE EDUCATION/TRAINING PROGRAM

## 2022-06-02 PROCEDURE — 64999 UNLISTED PX NERVOUS SYSTEM: CPT | Performed by: NEUROLOGICAL SURGERY

## 2022-06-02 RX ORDER — LABETALOL HYDROCHLORIDE 5 MG/ML
10 INJECTION, SOLUTION INTRAVENOUS
Status: DISCONTINUED | OUTPATIENT
Start: 2022-06-02 | End: 2022-06-02 | Stop reason: HOSPADM

## 2022-06-02 RX ORDER — PROCHLORPERAZINE MALEATE 10 MG
10 TABLET ORAL EVERY 6 HOURS PRN
Status: DISCONTINUED | OUTPATIENT
Start: 2022-06-02 | End: 2022-06-04 | Stop reason: HOSPADM

## 2022-06-02 RX ORDER — ONDANSETRON 2 MG/ML
INJECTION INTRAMUSCULAR; INTRAVENOUS PRN
Status: DISCONTINUED | OUTPATIENT
Start: 2022-06-02 | End: 2022-06-02

## 2022-06-02 RX ORDER — HALOPERIDOL 5 MG/ML
1 INJECTION INTRAMUSCULAR
Status: DISCONTINUED | OUTPATIENT
Start: 2022-06-02 | End: 2022-06-02 | Stop reason: HOSPADM

## 2022-06-02 RX ORDER — SODIUM CHLORIDE 9 MG/ML
INJECTION, SOLUTION INTRAVENOUS CONTINUOUS
Status: ACTIVE | OUTPATIENT
Start: 2022-06-02 | End: 2022-06-03

## 2022-06-02 RX ORDER — ALBUMIN, HUMAN INJ 5% 5 %
SOLUTION INTRAVENOUS CONTINUOUS PRN
Status: DISCONTINUED | OUTPATIENT
Start: 2022-06-02 | End: 2022-06-02

## 2022-06-02 RX ORDER — CALCIUM CHLORIDE 100 MG/ML
INJECTION INTRAVENOUS; INTRAVENTRICULAR PRN
Status: DISCONTINUED | OUTPATIENT
Start: 2022-06-02 | End: 2022-06-02

## 2022-06-02 RX ORDER — ONDANSETRON 2 MG/ML
4 INJECTION INTRAMUSCULAR; INTRAVENOUS EVERY 6 HOURS PRN
Status: DISCONTINUED | OUTPATIENT
Start: 2022-06-02 | End: 2022-06-04 | Stop reason: HOSPADM

## 2022-06-02 RX ORDER — SODIUM CHLORIDE, SODIUM LACTATE, POTASSIUM CHLORIDE, CALCIUM CHLORIDE 600; 310; 30; 20 MG/100ML; MG/100ML; MG/100ML; MG/100ML
INJECTION, SOLUTION INTRAVENOUS CONTINUOUS
Status: DISCONTINUED | OUTPATIENT
Start: 2022-06-02 | End: 2022-06-02 | Stop reason: HOSPADM

## 2022-06-02 RX ORDER — HYDROMORPHONE HYDROCHLORIDE 1 MG/ML
0.2 INJECTION, SOLUTION INTRAMUSCULAR; INTRAVENOUS; SUBCUTANEOUS EVERY 5 MIN PRN
Status: DISCONTINUED | OUTPATIENT
Start: 2022-06-02 | End: 2022-06-02 | Stop reason: HOSPADM

## 2022-06-02 RX ORDER — LEVETIRACETAM 750 MG/1
750 TABLET ORAL 2 TIMES DAILY
Status: DISCONTINUED | OUTPATIENT
Start: 2022-06-02 | End: 2022-06-04 | Stop reason: HOSPADM

## 2022-06-02 RX ORDER — FENTANYL CITRATE 50 UG/ML
25 INJECTION, SOLUTION INTRAMUSCULAR; INTRAVENOUS EVERY 5 MIN PRN
Status: DISCONTINUED | OUTPATIENT
Start: 2022-06-02 | End: 2022-06-02 | Stop reason: HOSPADM

## 2022-06-02 RX ORDER — HYDROCORTISONE 20 MG/1
20 TABLET ORAL ONCE
Status: DISCONTINUED | OUTPATIENT
Start: 2022-06-04 | End: 2022-06-03

## 2022-06-02 RX ORDER — LIDOCAINE HYDROCHLORIDE 20 MG/ML
INJECTION, SOLUTION INFILTRATION; PERINEURAL PRN
Status: DISCONTINUED | OUTPATIENT
Start: 2022-06-02 | End: 2022-06-02

## 2022-06-02 RX ORDER — ONDANSETRON 2 MG/ML
4 INJECTION INTRAMUSCULAR; INTRAVENOUS EVERY 30 MIN PRN
Status: DISCONTINUED | OUTPATIENT
Start: 2022-06-02 | End: 2022-06-02 | Stop reason: HOSPADM

## 2022-06-02 RX ORDER — DIPHENHYDRAMINE HCL 25 MG
25 CAPSULE ORAL EVERY 6 HOURS PRN
Status: DISCONTINUED | OUTPATIENT
Start: 2022-06-02 | End: 2022-06-02 | Stop reason: HOSPADM

## 2022-06-02 RX ORDER — CEFAZOLIN SODIUM 2 G/100ML
2 INJECTION, SOLUTION INTRAVENOUS EVERY 8 HOURS
Status: DISCONTINUED | OUTPATIENT
Start: 2022-06-02 | End: 2022-06-04 | Stop reason: HOSPADM

## 2022-06-02 RX ORDER — DIAZEPAM 10 MG/2ML
2.5 INJECTION, SOLUTION INTRAMUSCULAR; INTRAVENOUS
Status: DISCONTINUED | OUTPATIENT
Start: 2022-06-02 | End: 2022-06-02 | Stop reason: HOSPADM

## 2022-06-02 RX ORDER — GLYCOPYRROLATE 0.2 MG/ML
INJECTION, SOLUTION INTRAMUSCULAR; INTRAVENOUS PRN
Status: DISCONTINUED | OUTPATIENT
Start: 2022-06-02 | End: 2022-06-02

## 2022-06-02 RX ORDER — NALOXONE HYDROCHLORIDE 0.4 MG/ML
0.4 INJECTION, SOLUTION INTRAMUSCULAR; INTRAVENOUS; SUBCUTANEOUS
Status: DISCONTINUED | OUTPATIENT
Start: 2022-06-02 | End: 2022-06-02 | Stop reason: HOSPADM

## 2022-06-02 RX ORDER — HYDRALAZINE HYDROCHLORIDE 20 MG/ML
10-20 INJECTION INTRAMUSCULAR; INTRAVENOUS EVERY 30 MIN PRN
Status: DISCONTINUED | OUTPATIENT
Start: 2022-06-02 | End: 2022-06-04 | Stop reason: HOSPADM

## 2022-06-02 RX ORDER — ONDANSETRON 4 MG/1
4 TABLET, ORALLY DISINTEGRATING ORAL EVERY 6 HOURS PRN
Status: DISCONTINUED | OUTPATIENT
Start: 2022-06-02 | End: 2022-06-04 | Stop reason: HOSPADM

## 2022-06-02 RX ORDER — MEPERIDINE HYDROCHLORIDE 25 MG/ML
12.5 INJECTION INTRAMUSCULAR; INTRAVENOUS; SUBCUTANEOUS EVERY 5 MIN PRN
Status: DISCONTINUED | OUTPATIENT
Start: 2022-06-02 | End: 2022-06-02 | Stop reason: HOSPADM

## 2022-06-02 RX ORDER — PROPOFOL 10 MG/ML
INJECTION, EMULSION INTRAVENOUS PRN
Status: DISCONTINUED | OUTPATIENT
Start: 2022-06-02 | End: 2022-06-02

## 2022-06-02 RX ORDER — DEXMEDETOMIDINE HYDROCHLORIDE 4 UG/ML
.1-1.2 INJECTION, SOLUTION INTRAVENOUS CONTINUOUS
Status: DISCONTINUED | OUTPATIENT
Start: 2022-06-02 | End: 2022-06-03

## 2022-06-02 RX ORDER — EPHEDRINE SULFATE 50 MG/ML
INJECTION, SOLUTION INTRAMUSCULAR; INTRAVENOUS; SUBCUTANEOUS PRN
Status: DISCONTINUED | OUTPATIENT
Start: 2022-06-02 | End: 2022-06-02

## 2022-06-02 RX ORDER — ACETAMINOPHEN 325 MG/1
650 TABLET ORAL EVERY 4 HOURS PRN
Status: DISCONTINUED | OUTPATIENT
Start: 2022-06-05 | End: 2022-06-03

## 2022-06-02 RX ORDER — LABETALOL HYDROCHLORIDE 5 MG/ML
10-40 INJECTION, SOLUTION INTRAVENOUS EVERY 10 MIN PRN
Status: DISCONTINUED | OUTPATIENT
Start: 2022-06-02 | End: 2022-06-04 | Stop reason: HOSPADM

## 2022-06-02 RX ORDER — FENTANYL CITRATE 50 UG/ML
INJECTION, SOLUTION INTRAMUSCULAR; INTRAVENOUS PRN
Status: DISCONTINUED | OUTPATIENT
Start: 2022-06-02 | End: 2022-06-02

## 2022-06-02 RX ORDER — DIPHENHYDRAMINE HYDROCHLORIDE 50 MG/ML
25 INJECTION INTRAMUSCULAR; INTRAVENOUS EVERY 6 HOURS PRN
Status: DISCONTINUED | OUTPATIENT
Start: 2022-06-02 | End: 2022-06-02 | Stop reason: HOSPADM

## 2022-06-02 RX ORDER — SODIUM CHLORIDE, SODIUM GLUCONATE, SODIUM ACETATE, POTASSIUM CHLORIDE AND MAGNESIUM CHLORIDE 526; 502; 368; 37; 30 MG/100ML; MG/100ML; MG/100ML; MG/100ML; MG/100ML
INJECTION, SOLUTION INTRAVENOUS CONTINUOUS PRN
Status: DISCONTINUED | OUTPATIENT
Start: 2022-06-02 | End: 2022-06-02

## 2022-06-02 RX ORDER — ONDANSETRON 4 MG/1
4 TABLET, ORALLY DISINTEGRATING ORAL EVERY 30 MIN PRN
Status: DISCONTINUED | OUTPATIENT
Start: 2022-06-02 | End: 2022-06-02 | Stop reason: HOSPADM

## 2022-06-02 RX ORDER — SODIUM CHLORIDE, SODIUM LACTATE, POTASSIUM CHLORIDE, CALCIUM CHLORIDE 600; 310; 30; 20 MG/100ML; MG/100ML; MG/100ML; MG/100ML
INJECTION, SOLUTION INTRAVENOUS CONTINUOUS PRN
Status: DISCONTINUED | OUTPATIENT
Start: 2022-06-02 | End: 2022-06-02

## 2022-06-02 RX ORDER — PROPOFOL 10 MG/ML
INJECTION, EMULSION INTRAVENOUS CONTINUOUS PRN
Status: DISCONTINUED | OUTPATIENT
Start: 2022-06-02 | End: 2022-06-02

## 2022-06-02 RX ORDER — HYDROXYZINE HYDROCHLORIDE 10 MG/1
10 TABLET, FILM COATED ORAL 3 TIMES DAILY PRN
Status: DISCONTINUED | OUTPATIENT
Start: 2022-06-02 | End: 2022-06-04 | Stop reason: HOSPADM

## 2022-06-02 RX ORDER — DEXAMETHASONE SODIUM PHOSPHATE 4 MG/ML
INJECTION, SOLUTION INTRA-ARTICULAR; INTRALESIONAL; INTRAMUSCULAR; INTRAVENOUS; SOFT TISSUE PRN
Status: DISCONTINUED | OUTPATIENT
Start: 2022-06-02 | End: 2022-06-02

## 2022-06-02 RX ORDER — CEFTRIAXONE 1 G/1
1 INJECTION, POWDER, FOR SOLUTION INTRAMUSCULAR; INTRAVENOUS
Status: COMPLETED | OUTPATIENT
Start: 2022-06-02 | End: 2022-06-02

## 2022-06-02 RX ORDER — EPINEPHRINE 0.1 MG/ML
SYRINGE (ML) INJECTION PRN
Status: DISCONTINUED | OUTPATIENT
Start: 2022-06-02 | End: 2022-06-02 | Stop reason: HOSPADM

## 2022-06-02 RX ORDER — HYDROCORTISONE 10 MG/1
10 TABLET ORAL EVERY EVENING
Status: DISCONTINUED | OUTPATIENT
Start: 2022-06-05 | End: 2022-06-03

## 2022-06-02 RX ORDER — HYDROCORTISONE 20 MG/1
40 TABLET ORAL ONCE
Status: DISCONTINUED | OUTPATIENT
Start: 2022-06-04 | End: 2022-06-03

## 2022-06-02 RX ORDER — GADOBUTROL 604.72 MG/ML
10 INJECTION INTRAVENOUS ONCE
Status: COMPLETED | OUTPATIENT
Start: 2022-06-02 | End: 2022-06-02

## 2022-06-02 RX ORDER — HYDROCORTISONE 20 MG/1
20 TABLET ORAL EVERY MORNING
Status: DISCONTINUED | OUTPATIENT
Start: 2022-06-05 | End: 2022-06-03

## 2022-06-02 RX ORDER — POLYETHYLENE GLYCOL 3350 17 G/17G
17 POWDER, FOR SOLUTION ORAL DAILY
Status: DISCONTINUED | OUTPATIENT
Start: 2022-06-03 | End: 2022-06-04

## 2022-06-02 RX ORDER — AMOXICILLIN 250 MG
1 CAPSULE ORAL 2 TIMES DAILY
Status: DISCONTINUED | OUTPATIENT
Start: 2022-06-02 | End: 2022-06-04

## 2022-06-02 RX ORDER — PHENYLEPHRINE HCL IN 0.9% NACL 50MG/250ML
.5-1.25 PLASTIC BAG, INJECTION (ML) INTRAVENOUS CONTINUOUS
Status: DISCONTINUED | OUTPATIENT
Start: 2022-06-02 | End: 2022-06-02 | Stop reason: HOSPADM

## 2022-06-02 RX ORDER — OXYCODONE HYDROCHLORIDE 5 MG/1
5 TABLET ORAL EVERY 4 HOURS PRN
Status: DISCONTINUED | OUTPATIENT
Start: 2022-06-02 | End: 2022-06-02 | Stop reason: HOSPADM

## 2022-06-02 RX ORDER — IOPAMIDOL 755 MG/ML
75 INJECTION, SOLUTION INTRAVASCULAR ONCE
Status: COMPLETED | OUTPATIENT
Start: 2022-06-02 | End: 2022-06-02

## 2022-06-02 RX ORDER — LIDOCAINE 40 MG/G
CREAM TOPICAL
Status: DISCONTINUED | OUTPATIENT
Start: 2022-06-02 | End: 2022-06-02 | Stop reason: HOSPADM

## 2022-06-02 RX ORDER — BISACODYL 10 MG
10 SUPPOSITORY, RECTAL RECTAL DAILY PRN
Status: DISCONTINUED | OUTPATIENT
Start: 2022-06-02 | End: 2022-06-04 | Stop reason: HOSPADM

## 2022-06-02 RX ORDER — NALOXONE HYDROCHLORIDE 0.4 MG/ML
0.2 INJECTION, SOLUTION INTRAMUSCULAR; INTRAVENOUS; SUBCUTANEOUS
Status: DISCONTINUED | OUTPATIENT
Start: 2022-06-02 | End: 2022-06-02 | Stop reason: HOSPADM

## 2022-06-02 RX ORDER — ACETAMINOPHEN 325 MG/1
975 TABLET ORAL EVERY 8 HOURS
Status: DISCONTINUED | OUTPATIENT
Start: 2022-06-02 | End: 2022-06-03

## 2022-06-02 RX ORDER — HYDRALAZINE HYDROCHLORIDE 20 MG/ML
2.5-5 INJECTION INTRAMUSCULAR; INTRAVENOUS EVERY 10 MIN PRN
Status: DISCONTINUED | OUTPATIENT
Start: 2022-06-02 | End: 2022-06-02 | Stop reason: HOSPADM

## 2022-06-02 RX ORDER — LIDOCAINE HYDROCHLORIDE AND EPINEPHRINE 10; 10 MG/ML; UG/ML
INJECTION, SOLUTION INFILTRATION; PERINEURAL PRN
Status: DISCONTINUED | OUTPATIENT
Start: 2022-06-02 | End: 2022-06-02 | Stop reason: HOSPADM

## 2022-06-02 RX ORDER — LIDOCAINE 40 MG/G
CREAM TOPICAL
Status: DISCONTINUED | OUTPATIENT
Start: 2022-06-02 | End: 2022-06-04 | Stop reason: HOSPADM

## 2022-06-02 RX ADMIN — GLYCOPYRROLATE 0.4 MG: 0.2 INJECTION, SOLUTION INTRAMUSCULAR; INTRAVENOUS at 15:50

## 2022-06-02 RX ADMIN — PROPOFOL 150 MG: 10 INJECTION, EMULSION INTRAVENOUS at 08:45

## 2022-06-02 RX ADMIN — HYDROMORPHONE HYDROCHLORIDE 0.2 MG: 1 INJECTION, SOLUTION INTRAMUSCULAR; INTRAVENOUS; SUBCUTANEOUS at 22:19

## 2022-06-02 RX ADMIN — PHENYLEPHRINE HYDROCHLORIDE 50 MCG: 10 INJECTION INTRAVENOUS at 18:22

## 2022-06-02 RX ADMIN — IOPAMIDOL 75 ML: 755 INJECTION, SOLUTION INTRAVENOUS at 08:34

## 2022-06-02 RX ADMIN — PHENYLEPHRINE HYDROCHLORIDE 100 MCG: 10 INJECTION INTRAVENOUS at 09:31

## 2022-06-02 RX ADMIN — Medication 20 MCG: at 19:15

## 2022-06-02 RX ADMIN — DEXAMETHASONE SODIUM PHOSPHATE 10 MG: 4 INJECTION, SOLUTION INTRA-ARTICULAR; INTRALESIONAL; INTRAMUSCULAR; INTRAVENOUS; SOFT TISSUE at 08:45

## 2022-06-02 RX ADMIN — Medication 10 MG: at 09:23

## 2022-06-02 RX ADMIN — DEXMEDETOMIDINE HYDROCHLORIDE 0.2 MCG/KG/HR: 400 INJECTION INTRAVENOUS at 20:30

## 2022-06-02 RX ADMIN — PROPOFOL 150 MCG/KG/MIN: 10 INJECTION, EMULSION INTRAVENOUS at 09:01

## 2022-06-02 RX ADMIN — FENTANYL CITRATE 25 MCG: 50 INJECTION, SOLUTION INTRAMUSCULAR; INTRAVENOUS at 20:13

## 2022-06-02 RX ADMIN — FENTANYL CITRATE 25 MCG: 50 INJECTION, SOLUTION INTRAMUSCULAR; INTRAVENOUS at 19:45

## 2022-06-02 RX ADMIN — ALBUMIN (HUMAN): 12.5 SOLUTION INTRAVENOUS at 16:14

## 2022-06-02 RX ADMIN — ONDANSETRON 4 MG: 2 INJECTION INTRAMUSCULAR; INTRAVENOUS at 18:55

## 2022-06-02 RX ADMIN — REMIFENTANIL HYDROCHLORIDE 0.08 MCG/KG/MIN: 1 INJECTION, POWDER, LYOPHILIZED, FOR SOLUTION INTRAVENOUS at 17:14

## 2022-06-02 RX ADMIN — FENTANYL CITRATE 25 MCG: 50 INJECTION, SOLUTION INTRAMUSCULAR; INTRAVENOUS at 20:22

## 2022-06-02 RX ADMIN — REMIFENTANIL HYDROCHLORIDE 0.08 MCG/KG/MIN: 1 INJECTION, POWDER, LYOPHILIZED, FOR SOLUTION INTRAVENOUS at 09:03

## 2022-06-02 RX ADMIN — CALCIUM CHLORIDE 200 MG: 100 INJECTION INTRAVENOUS; INTRAVENTRICULAR at 17:32

## 2022-06-02 RX ADMIN — FENTANYL CITRATE 50 MCG: 50 INJECTION, SOLUTION INTRAMUSCULAR; INTRAVENOUS at 19:47

## 2022-06-02 RX ADMIN — FENTANYL CITRATE 50 MCG: 50 INJECTION, SOLUTION INTRAMUSCULAR; INTRAVENOUS at 19:43

## 2022-06-02 RX ADMIN — SODIUM CHLORIDE, SODIUM GLUCONATE, SODIUM ACETATE, POTASSIUM CHLORIDE AND MAGNESIUM CHLORIDE: 526; 502; 368; 37; 30 INJECTION, SOLUTION INTRAVENOUS at 17:00

## 2022-06-02 RX ADMIN — Medication 10 MG: at 09:31

## 2022-06-02 RX ADMIN — SODIUM CHLORIDE, SODIUM GLUCONATE, SODIUM ACETATE, POTASSIUM CHLORIDE AND MAGNESIUM CHLORIDE: 526; 502; 368; 37; 30 INJECTION, SOLUTION INTRAVENOUS at 08:45

## 2022-06-02 RX ADMIN — Medication 50 MG: at 08:45

## 2022-06-02 RX ADMIN — SODIUM CHLORIDE, SODIUM GLUCONATE, SODIUM ACETATE, POTASSIUM CHLORIDE AND MAGNESIUM CHLORIDE: 526; 502; 368; 37; 30 INJECTION, SOLUTION INTRAVENOUS at 12:50

## 2022-06-02 RX ADMIN — CALCIUM CHLORIDE 200 MG: 100 INJECTION INTRAVENOUS; INTRAVENTRICULAR at 17:25

## 2022-06-02 RX ADMIN — SODIUM CHLORIDE: 9 INJECTION, SOLUTION INTRAVENOUS at 21:20

## 2022-06-02 RX ADMIN — FENTANYL CITRATE 25 MCG: 50 INJECTION, SOLUTION INTRAMUSCULAR; INTRAVENOUS at 19:50

## 2022-06-02 RX ADMIN — PHENYLEPHRINE HYDROCHLORIDE 100 MCG: 10 INJECTION INTRAVENOUS at 17:29

## 2022-06-02 RX ADMIN — Medication 5 MG: at 09:19

## 2022-06-02 RX ADMIN — SODIUM CHLORIDE, POTASSIUM CHLORIDE, SODIUM LACTATE AND CALCIUM CHLORIDE: 600; 310; 30; 20 INJECTION, SOLUTION INTRAVENOUS at 08:45

## 2022-06-02 RX ADMIN — LIDOCAINE HYDROCHLORIDE 100 MG: 20 INJECTION, SOLUTION INFILTRATION; PERINEURAL at 08:45

## 2022-06-02 RX ADMIN — PROPOFOL 50 MG: 10 INJECTION, EMULSION INTRAVENOUS at 08:58

## 2022-06-02 RX ADMIN — PHENYLEPHRINE HYDROCHLORIDE 100 MCG: 10 INJECTION INTRAVENOUS at 17:45

## 2022-06-02 RX ADMIN — Medication 0.2 MCG/KG/MIN: at 17:52

## 2022-06-02 RX ADMIN — Medication 5 MG: at 16:50

## 2022-06-02 RX ADMIN — GLYCOPYRROLATE 0.2 MG: 0.2 INJECTION, SOLUTION INTRAMUSCULAR; INTRAVENOUS at 09:34

## 2022-06-02 RX ADMIN — PHENYLEPHRINE HYDROCHLORIDE 100 MCG: 10 INJECTION INTRAVENOUS at 17:39

## 2022-06-02 RX ADMIN — CEFTRIAXONE 1 G: 1 INJECTION, POWDER, FOR SOLUTION INTRAMUSCULAR; INTRAVENOUS at 08:50

## 2022-06-02 RX ADMIN — PHENYLEPHRINE HYDROCHLORIDE 100 MCG: 10 INJECTION INTRAVENOUS at 16:54

## 2022-06-02 RX ADMIN — GADOBUTROL 10 ML: 604.72 INJECTION INTRAVENOUS at 07:40

## 2022-06-02 RX ADMIN — ALBUMIN (HUMAN): 12.5 SOLUTION INTRAVENOUS at 15:21

## 2022-06-02 RX ADMIN — SODIUM CHLORIDE, SODIUM GLUCONATE, SODIUM ACETATE, POTASSIUM CHLORIDE AND MAGNESIUM CHLORIDE: 526; 502; 368; 37; 30 INJECTION, SOLUTION INTRAVENOUS at 18:07

## 2022-06-02 RX ADMIN — CEFTRIAXONE 1 G: 1 INJECTION, POWDER, FOR SOLUTION INTRAMUSCULAR; INTRAVENOUS at 11:13

## 2022-06-02 RX ADMIN — ALBUMIN (HUMAN): 12.5 SOLUTION INTRAVENOUS at 15:35

## 2022-06-02 RX ADMIN — FENTANYL CITRATE 100 MCG: 50 INJECTION, SOLUTION INTRAMUSCULAR; INTRAVENOUS at 08:45

## 2022-06-02 RX ADMIN — SODIUM CHLORIDE, POTASSIUM CHLORIDE, SODIUM LACTATE AND CALCIUM CHLORIDE: 600; 310; 30; 20 INJECTION, SOLUTION INTRAVENOUS at 14:45

## 2022-06-02 RX ADMIN — CALCIUM CHLORIDE 200 MG: 100 INJECTION INTRAVENOUS; INTRAVENTRICULAR at 17:44

## 2022-06-02 RX ADMIN — CALCIUM CHLORIDE 200 MG: 100 INJECTION INTRAVENOUS; INTRAVENTRICULAR at 17:04

## 2022-06-02 RX ADMIN — PROPOFOL 30 MG: 10 INJECTION, EMULSION INTRAVENOUS at 12:55

## 2022-06-02 RX ADMIN — PHENYLEPHRINE HYDROCHLORIDE 100 MCG: 10 INJECTION INTRAVENOUS at 17:01

## 2022-06-02 RX ADMIN — CALCIUM CHLORIDE 200 MG: 100 INJECTION INTRAVENOUS; INTRAVENTRICULAR at 17:10

## 2022-06-02 RX ADMIN — MIDAZOLAM 2 MG: 1 INJECTION INTRAMUSCULAR; INTRAVENOUS at 08:33

## 2022-06-02 ASSESSMENT — ACTIVITIES OF DAILY LIVING (ADL)
ADLS_ACUITY_SCORE: 18

## 2022-06-02 ASSESSMENT — LIFESTYLE VARIABLES: TOBACCO_USE: 0

## 2022-06-02 ASSESSMENT — ENCOUNTER SYMPTOMS: SEIZURES: 0

## 2022-06-02 NOTE — ANESTHESIA PROCEDURE NOTES
Airway       Patient location during procedure: OR       Procedure Start/Stop Times: 6/2/2022 8:52 AM  Staff -        CRNA: Irlanda Nunez APRN CRNA       Other Anesthesia Staff: Madhuri Bustamante       Performed By: MIMI and with CRNAs       Procedure performed by resident/fellow/CRNA in presence of a teaching physician.    Consent for Airway        Urgency: elective  Indications and Patient Condition       Indications for airway management: woody-procedural       Induction type:intravenous       Mask difficulty assessment: 1 - vent by mask    Final Airway Details       Final airway type: endotracheal airway       Successful airway: ETT - single  Endotracheal Airway Details        ETT size (mm): 7.5       Cuffed: yes       Cuff volume (mL): 10       Successful intubation technique: direct laryngoscopy       DL Blade Type: Wright 2       Grade View of Cords: 1       Adjucts: stylet       Position: Left       Measured from: gums/teeth       Secured at (cm): 21       Bite block used: Soft    Post intubation assessment        Placement verified by: capnometry, equal breath sounds and chest rise        Number of attempts at approach: 1       Number of other approaches attempted: 0       Secured with: silk tape       Ease of procedure: easy       Dentition: Intact and Unchanged    Medication(s) Administered   Medication Administration Time: 6/2/2022 8:52 AM

## 2022-06-02 NOTE — ANESTHESIA PREPROCEDURE EVALUATION
Anesthesia Pre-Procedure Evaluation    Patient: Vincenzo Avery   MRN: [id number] : 1975        Procedure : Procedure(s):  stealth assisted Redo endoscopic, endonasal approach for resection of recurrent pituitary tumor  INSERTION, DRAIN, SPINE, LUMBAR          Past Medical History:   Diagnosis Date     History of kidney stones      Migraines      NIC (obstructive sleep apnea)     Does not use CPAP     Pituitary mass (H)       Past Surgical History:   Procedure Laterality Date     DENTAL SURGERY      [place] teeth extraction     HAND SURGERY Right     Repair of fracture     INSERT DRAIN LUMBAR N/A 3/22/2021    Procedure: INSERTION, DRAIN, SPINE, LUMBAR;  Surgeon: Rolf Neely MD;  Location: [facility] OR     OPTICAL TRACKING SYSTEM ENDOSCOPIC RESECTION TUMOR CRANIAL N/A 2018    Procedure: OPTICAL TRACKING SYSTEM ENDOSCOPIC RESECTION TUMOR CRANIAL;  Stealth Assisted Endoscopic Transnasal Resection Of Pituitary Tumor;  Surgeon: Jo-Ann Green MD;  Location: [facility] OR     OPTICAL TRACKING SYSTEM ENDOSCOPIC RESECTION TUMOR CRANIAL Right 3/22/2021    Procedure: stealth assisted Right frontotemporal craniotomy and resection of tumor;  Surgeon: Rolf Neely MD;  Location: [facility] OR     ORCHIECTOMY SCROTAL Right       Allergies   Allergen Reactions     Morphine      Patient has significant nausea/vomiting with IV morphine.        Social History     Tobacco Use     Smoking status: Never Smoker     Smokeless tobacco: Never Used   Substance Use Topics     Alcohol use: Yes     Comment: Rarely      Wt Readings from Last 1 Encounters:   22 94.8 kg (209 lb)        Anesthesia Evaluation   Pt has had prior anesthetic. Type: General.    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, ascend stairs   Hematologic:  - neg hematologic  ROS  (-) history of blood clots and history of blood transfusion   Musculoskeletal:  - neg musculoskeletal ROS     GI/Hepatic:  - neg GI/hepatic ROS  (-) GERD   Renal/Genitourinary:  - neg Renal ROS     Endo:  - neg endo ROS     Psychiatric/Substance Use:  - neg psychiatric ROS     Infectious Disease:  - neg infectious disease ROS     Malignancy:       Other:               OUTSIDE LABS:  CBC:   Lab Results   Component Value Date    WBC 8.2 03/26/2021    WBC 10.1 03/25/2021    HGB 9.6 (L) 03/26/2021    HGB 9.0 (L) 03/25/2021    HCT 29.4 (L) 03/26/2021    HCT 28.0 (L) 03/25/2021     03/26/2021     03/25/2021     BMP:   Lab Results   Component Value Date     03/26/2021     03/25/2021    POTASSIUM 4.1 03/26/2021    POTASSIUM 3.9 03/25/2021    CHLORIDE 111 (H) 03/26/2021    CHLORIDE 109 03/25/2021    CO2 23 03/26/2021    CO2 26 03/25/2021    BUN 26 03/26/2021    BUN 22 03/25/2021    CR 0.81 03/26/2021    CR 0.85 03/25/2021     (H) 03/26/2021     (H) 03/25/2021     COAGS:   Lab Results   Component Value Date    PTT 31 06/28/2018    INR 0.90 03/22/2021     POC:   Lab Results   Component Value Date     (H) 03/23/2021     HEPATIC:   Lab Results   Component Value Date    ALBUMIN 3.0 (L) 03/23/2021    PROTTOTAL 5.8 (L) 03/23/2021    ALT 19 03/23/2021    AST 33 03/23/2021    ALKPHOS 55 03/23/2021    BILITOTAL 0.2 03/23/2021     OTHER:   Lab Results   Component Value Date    PH 7.34 (L) 03/22/2021    LACT 0.9 03/22/2021    A1C 5.5 03/23/2021    KANA 8.6 03/26/2021    PHOS 2.6 03/26/2021    MAG 2.4 (H) 03/26/2021    TSH 1.55 09/17/2019    T4 1.02 03/24/2021       Anesthesia  Plan    ASA Status:  2      Anesthesia Type: General.     - Airway: ETT   Induction: Intravenous.   Maintenance: Balanced.   Techniques and Equipment:     - Lines/Monitors: 2nd IV     Consents    Anesthesia Plan(s) and associated risks, benefits, and realistic alternatives discussed. Questions answered and patient/representative(s) expressed understanding.    - Discussed:     - Discussed with:  Patient      - Extended Intubation/Ventilatory Support Discussed: No.      - Patient is DNR/DNI Status: No    Use of blood products discussed: Yes.     - Discussed with: Patient.     - Consented: consented to blood products            Reason for refusal: other.     Postoperative Care    Pain management: IV analgesics.   PONV prophylaxis: Ondansetron (or other 5HT-3), Dexamethasone or Solumedrol     Comments:                Juan Manuel Cunningham MD

## 2022-06-02 NOTE — PROGRESS NOTES
Neurosurgery Pre-Op Exam    There were no vitals taken for this visit.  -- Awake; Alert; oriented x 3  -- Follows commands briskly  -- +repetition and naming  -- Speech fluent, spontaneous. No aphasia or dysarthria.  -- Right eye down and out  Cranial Nerves:  -- Left eye with temporal field cut, PERRL 3-2mm bilat and brisk, right eye 3rd nerve palsy  -- Right eye 20/100, left eye 20/40  -- Face symmetrical, tongue midline  -- Sensory V1-V3 intact bilaterally  -- Palate elevates symmetrically, uvula midline  -- Hearing grossly intact bilat  -- Trapezii 5/5 strength bilat symmetric    Motor:  Normal bulk / tone.  No muscle wasting or fasciculations  No Pronator Drift     Delt Bi Tri FE IP Quad Hamst TibAnt EHL Gastroc    C5 C6 C7 C8/T1 L2 L3 L4-S1 L4 L5 S1   R 5 5 5 5 5 5 5 5 5 5   L 5 5 5 5 5 5 5 5 5 5     Sensory:  intact to LT        Ben Montoya MD  Neurosurgery PGY6

## 2022-06-02 NOTE — INTERVAL H&P NOTE
"I have reviewed the surgical (or preoperative) H&P that is linked to this encounter, and examined the patient. There are no significant changes    Clinical Conditions Present on Arrival:  Clinically Significant Risk Factors Present on Admission                   # Obesity: Estimated body mass index is 30.86 kg/m  as calculated from the following:    Height as of 5/19/22: 1.753 m (5' 9\").    Weight as of 5/19/22: 94.8 kg (209 lb).       "

## 2022-06-02 NOTE — ANESTHESIA PROCEDURE NOTES
Arterial Line Procedure Note    Pre-Procedure   Staff -        Anesthesiologist:  Juan Manuel Cunningham MD       Performed By: anesthesiologist       Location: OR       Pre-Anesthestic Checklist: patient identified, IV checked, risks and benefits discussed, informed consent, monitors and equipment checked, pre-op evaluation and at physician/surgeon's request  Timeout:       Correct Patient: Yes        Correct Procedure: Yes        Correct Site: Yes        Correct Position: Yes   Line Placement:   This line was placed Post Induction  Procedure   Procedure: arterial line       Laterality: left       Insertion Site: radial.  Sterile Prep        Standard elements of sterile barrier followed       Skin prep: Chloraprep  Insertion/Injection        Technique: Seldinger Technique        Catheter Type/Size: 20 G, 1.75 in/4.5 cm quick cath (integral wire)  Narrative        Tegaderm dressing used.       Complications: None apparent,

## 2022-06-03 ENCOUNTER — APPOINTMENT (OUTPATIENT)
Dept: PHYSICAL THERAPY | Facility: CLINIC | Age: 47
End: 2022-06-03
Attending: NEUROLOGICAL SURGERY
Payer: COMMERCIAL

## 2022-06-03 ENCOUNTER — APPOINTMENT (OUTPATIENT)
Dept: CT IMAGING | Facility: CLINIC | Age: 47
End: 2022-06-03
Attending: NEUROLOGICAL SURGERY
Payer: COMMERCIAL

## 2022-06-03 LAB
ANION GAP SERPL CALCULATED.3IONS-SCNC: 8 MMOL/L (ref 3–14)
BUN SERPL-MCNC: 14 MG/DL (ref 7–30)
CALCIUM SERPL-MCNC: 8.1 MG/DL (ref 8.5–10.1)
CHLORIDE BLD-SCNC: 112 MMOL/L (ref 94–109)
CO2 SERPL-SCNC: 24 MMOL/L (ref 20–32)
CREAT SERPL-MCNC: 0.8 MG/DL (ref 0.66–1.25)
CREAT SERPL-MCNC: 0.84 MG/DL (ref 0.66–1.25)
ERYTHROCYTE [DISTWIDTH] IN BLOOD BY AUTOMATED COUNT: 13.6 % (ref 10–15)
GFR SERPL CREATININE-BSD FRML MDRD: >90 ML/MIN/1.73M2
GFR SERPL CREATININE-BSD FRML MDRD: >90 ML/MIN/1.73M2
GLUCOSE BLD-MCNC: 126 MG/DL (ref 70–99)
HCT VFR BLD AUTO: 28.7 % (ref 40–53)
HGB BLD-MCNC: 9.8 G/DL (ref 13.3–17.7)
MAGNESIUM SERPL-MCNC: 2.1 MG/DL (ref 1.6–2.3)
MAGNESIUM SERPL-MCNC: 2.3 MG/DL (ref 1.6–2.3)
MCH RBC QN AUTO: 30.3 PG (ref 26.5–33)
MCHC RBC AUTO-ENTMCNC: 34.1 G/DL (ref 31.5–36.5)
MCV RBC AUTO: 89 FL (ref 78–100)
PHOSPHATE SERPL-MCNC: 3.1 MG/DL (ref 2.5–4.5)
PHOSPHATE SERPL-MCNC: 3.3 MG/DL (ref 2.5–4.5)
PLATELET # BLD AUTO: 158 10E3/UL (ref 150–450)
POTASSIUM BLD-SCNC: 3.8 MMOL/L (ref 3.4–5.3)
POTASSIUM BLD-SCNC: 4 MMOL/L (ref 3.4–5.3)
RBC # BLD AUTO: 3.23 10E6/UL (ref 4.4–5.9)
SODIUM SERPL-SCNC: 144 MMOL/L (ref 133–144)
WBC # BLD AUTO: 9.6 10E3/UL (ref 4–11)

## 2022-06-03 PROCEDURE — 250N000011 HC RX IP 250 OP 636: Performed by: NEUROLOGICAL SURGERY

## 2022-06-03 PROCEDURE — 70450 CT HEAD/BRAIN W/O DYE: CPT | Mod: 26 | Performed by: RADIOLOGY

## 2022-06-03 PROCEDURE — 250N000013 HC RX MED GY IP 250 OP 250 PS 637

## 2022-06-03 PROCEDURE — 97161 PT EVAL LOW COMPLEX 20 MIN: CPT | Mod: GP | Performed by: PHYSICAL THERAPIST

## 2022-06-03 PROCEDURE — 250N000011 HC RX IP 250 OP 636: Performed by: STUDENT IN AN ORGANIZED HEALTH CARE EDUCATION/TRAINING PROGRAM

## 2022-06-03 PROCEDURE — 85027 COMPLETE CBC AUTOMATED: CPT | Performed by: STUDENT IN AN ORGANIZED HEALTH CARE EDUCATION/TRAINING PROGRAM

## 2022-06-03 PROCEDURE — 999N000111 HC STATISTIC OT IP EVAL DEFER

## 2022-06-03 PROCEDURE — 120N000002 HC R&B MED SURG/OB UMMC

## 2022-06-03 PROCEDURE — 84100 ASSAY OF PHOSPHORUS: CPT | Performed by: STUDENT IN AN ORGANIZED HEALTH CARE EDUCATION/TRAINING PROGRAM

## 2022-06-03 PROCEDURE — 999N000248 HC STATISTIC IV INSERT WITH US BY RN

## 2022-06-03 PROCEDURE — 97530 THERAPEUTIC ACTIVITIES: CPT | Mod: GP | Performed by: PHYSICAL THERAPIST

## 2022-06-03 PROCEDURE — 84132 ASSAY OF SERUM POTASSIUM: CPT | Performed by: STUDENT IN AN ORGANIZED HEALTH CARE EDUCATION/TRAINING PROGRAM

## 2022-06-03 PROCEDURE — 83735 ASSAY OF MAGNESIUM: CPT

## 2022-06-03 PROCEDURE — 82565 ASSAY OF CREATININE: CPT | Performed by: STUDENT IN AN ORGANIZED HEALTH CARE EDUCATION/TRAINING PROGRAM

## 2022-06-03 PROCEDURE — 70450 CT HEAD/BRAIN W/O DYE: CPT

## 2022-06-03 PROCEDURE — 70450 CT HEAD/BRAIN W/O DYE: CPT | Mod: 76

## 2022-06-03 PROCEDURE — 84100 ASSAY OF PHOSPHORUS: CPT

## 2022-06-03 PROCEDURE — 83735 ASSAY OF MAGNESIUM: CPT | Performed by: STUDENT IN AN ORGANIZED HEALTH CARE EDUCATION/TRAINING PROGRAM

## 2022-06-03 PROCEDURE — 250N000011 HC RX IP 250 OP 636

## 2022-06-03 PROCEDURE — 81003 URINALYSIS AUTO W/O SCOPE: CPT

## 2022-06-03 PROCEDURE — 250N000013 HC RX MED GY IP 250 OP 250 PS 637: Performed by: STUDENT IN AN ORGANIZED HEALTH CARE EDUCATION/TRAINING PROGRAM

## 2022-06-03 PROCEDURE — 84132 ASSAY OF SERUM POTASSIUM: CPT

## 2022-06-03 RX ORDER — HYDROCORTISONE 20 MG/1
20 TABLET ORAL EVERY MORNING
Status: DISCONTINUED | OUTPATIENT
Start: 2022-06-06 | End: 2022-06-03

## 2022-06-03 RX ORDER — NALOXONE HYDROCHLORIDE 0.4 MG/ML
0.2 INJECTION, SOLUTION INTRAMUSCULAR; INTRAVENOUS; SUBCUTANEOUS
Status: DISCONTINUED | OUTPATIENT
Start: 2022-06-03 | End: 2022-06-04 | Stop reason: HOSPADM

## 2022-06-03 RX ORDER — HYDROCORTISONE 10 MG/1
10 TABLET ORAL EVERY EVENING
Status: DISCONTINUED | OUTPATIENT
Start: 2022-06-06 | End: 2022-06-04 | Stop reason: HOSPADM

## 2022-06-03 RX ORDER — NALOXONE HYDROCHLORIDE 0.4 MG/ML
0.4 INJECTION, SOLUTION INTRAMUSCULAR; INTRAVENOUS; SUBCUTANEOUS
Status: DISCONTINUED | OUTPATIENT
Start: 2022-06-03 | End: 2022-06-04 | Stop reason: HOSPADM

## 2022-06-03 RX ORDER — HYDROCORTISONE 10 MG/1
10 TABLET ORAL EVERY EVENING
Status: DISCONTINUED | OUTPATIENT
Start: 2022-06-06 | End: 2022-06-03

## 2022-06-03 RX ORDER — HYDROCORTISONE 20 MG/1
40 TABLET ORAL ONCE
Status: DISCONTINUED | OUTPATIENT
Start: 2022-06-05 | End: 2022-06-03

## 2022-06-03 RX ORDER — DEXMEDETOMIDINE HYDROCHLORIDE 4 UG/ML
.1-1.2 INJECTION, SOLUTION INTRAVENOUS CONTINUOUS
Status: DISCONTINUED | OUTPATIENT
Start: 2022-06-03 | End: 2022-06-03

## 2022-06-03 RX ORDER — ACETAMINOPHEN 325 MG/1
650 TABLET ORAL EVERY 4 HOURS PRN
Status: DISCONTINUED | OUTPATIENT
Start: 2022-06-03 | End: 2022-06-04 | Stop reason: HOSPADM

## 2022-06-03 RX ORDER — HYDROCORTISONE 20 MG/1
20 TABLET ORAL ONCE
Status: DISCONTINUED | OUTPATIENT
Start: 2022-06-05 | End: 2022-06-04 | Stop reason: HOSPADM

## 2022-06-03 RX ORDER — HYDROCORTISONE 20 MG/1
20 TABLET ORAL EVERY MORNING
Status: DISCONTINUED | OUTPATIENT
Start: 2022-06-06 | End: 2022-06-04 | Stop reason: HOSPADM

## 2022-06-03 RX ORDER — HYDROCORTISONE 20 MG/1
20 TABLET ORAL ONCE
Status: DISCONTINUED | OUTPATIENT
Start: 2022-06-05 | End: 2022-06-03

## 2022-06-03 RX ORDER — HYDROCORTISONE 20 MG/1
40 TABLET ORAL ONCE
Status: DISCONTINUED | OUTPATIENT
Start: 2022-06-05 | End: 2022-06-04 | Stop reason: HOSPADM

## 2022-06-03 RX ORDER — POTASSIUM CHLORIDE 750 MG/1
20 TABLET, EXTENDED RELEASE ORAL ONCE
Status: COMPLETED | OUTPATIENT
Start: 2022-06-03 | End: 2022-06-03

## 2022-06-03 RX ADMIN — HYDROXYZINE HYDROCHLORIDE 10 MG: 10 TABLET ORAL at 19:40

## 2022-06-03 RX ADMIN — LEVETIRACETAM 750 MG: 750 TABLET, FILM COATED ORAL at 08:41

## 2022-06-03 RX ADMIN — ACETAMINOPHEN 325MG 650 MG: 325 TABLET ORAL at 19:14

## 2022-06-03 RX ADMIN — ACETAMINOPHEN 975 MG: 325 TABLET, FILM COATED ORAL at 00:11

## 2022-06-03 RX ADMIN — LABETALOL HYDROCHLORIDE 10 MG: 5 INJECTION, SOLUTION INTRAVENOUS at 00:34

## 2022-06-03 RX ADMIN — Medication 2 G: at 00:12

## 2022-06-03 RX ADMIN — HYDROCORTISONE SODIUM SUCCINATE 100 MG: 100 INJECTION, POWDER, FOR SOLUTION INTRAMUSCULAR; INTRAVENOUS at 08:43

## 2022-06-03 RX ADMIN — Medication 5 MG: at 17:04

## 2022-06-03 RX ADMIN — HYDROCORTISONE SODIUM SUCCINATE 100 MG: 100 INJECTION, POWDER, FOR SOLUTION INTRAMUSCULAR; INTRAVENOUS at 00:12

## 2022-06-03 RX ADMIN — ACETAMINOPHEN 325MG 650 MG: 325 TABLET ORAL at 11:36

## 2022-06-03 RX ADMIN — Medication 2 G: at 08:42

## 2022-06-03 RX ADMIN — Medication 2 G: at 21:37

## 2022-06-03 RX ADMIN — HYDROCORTISONE SODIUM SUCCINATE 100 MG: 100 INJECTION, POWDER, FOR SOLUTION INTRAMUSCULAR; INTRAVENOUS at 16:59

## 2022-06-03 RX ADMIN — HYDRALAZINE HYDROCHLORIDE 20 MG: 20 INJECTION INTRAMUSCULAR; INTRAVENOUS at 09:17

## 2022-06-03 RX ADMIN — Medication 5 MG: at 21:37

## 2022-06-03 RX ADMIN — HYDROCORTISONE SODIUM SUCCINATE 50 MG: 100 INJECTION, POWDER, FOR SOLUTION INTRAMUSCULAR; INTRAVENOUS at 23:17

## 2022-06-03 RX ADMIN — LEVETIRACETAM 750 MG: 750 TABLET, FILM COATED ORAL at 00:13

## 2022-06-03 RX ADMIN — POTASSIUM CHLORIDE 20 MEQ: 750 TABLET, EXTENDED RELEASE ORAL at 09:12

## 2022-06-03 RX ADMIN — HYDRALAZINE HYDROCHLORIDE 20 MG: 20 INJECTION INTRAMUSCULAR; INTRAVENOUS at 06:52

## 2022-06-03 RX ADMIN — Medication 5 MG: at 01:59

## 2022-06-03 RX ADMIN — HYDRALAZINE HYDROCHLORIDE 20 MG: 20 INJECTION INTRAMUSCULAR; INTRAVENOUS at 01:26

## 2022-06-03 RX ADMIN — Medication 5 MG: at 09:12

## 2022-06-03 RX ADMIN — LEVETIRACETAM 750 MG: 750 TABLET, FILM COATED ORAL at 19:40

## 2022-06-03 ASSESSMENT — ACTIVITIES OF DAILY LIVING (ADL)
ADLS_ACUITY_SCORE: 24
ADLS_ACUITY_SCORE: 18
ADLS_ACUITY_SCORE: 24

## 2022-06-03 NOTE — PROGRESS NOTES
"   06/03/22 1400   Quick Adds   Type of Visit Initial PT Evaluation   Living Environment   People in Home significant other   Current Living Arrangements house   Home Accessibility stairs to enter home   Number of Stairs, Main Entrance 6   Stair Railings, Main Entrance none   Transportation Anticipated car, drives self;family or friend will provide   Living Environment Comments SO can assist if needed   Self-Care   Usual Activity Tolerance excellent   Current Activity Tolerance good   Regular Exercise Yes   Activity/Exercise Type walking   Exercise Amount/Frequency greater than 1 hr;daily   Equipment Currently Used at Home none   Fall history within last six months no   Activity/Exercise/Self-Care Comment IND with all functional mobility ADLs.  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   General Information   Onset of Illness/Injury or Date of Surgery 06/02/22   Referring Physician Earline Lopez MD   Patient/Family Therapy Goals Statement (PT) return home   Pertinent History of Current Problem (include personal factors and/or comorbidities that impact the POC) Vincenzo Avery is a 47 year old male with known large pituitary macroadenoma s/p 2 previous resections who presented to [facility] with evidence of growth of residual tumor on serial MRI.  In its growth, the tumor made contact with the ophthalmic segment of the right ICA and encased the cavernous segment.  On 6/2, he underwent repeat endonasal endoscopic approach for pituitary macroadenoma resection by Elena Neely and Sudhir.  He was admitted to 3C postoperatively due to 4A overflow.  He feels well   Existing Precautions/Restrictions other (see comments)  (nasal)   Cognition   Affect/Mental Status (Cognition) WNL   Orientation Status (Cognition) oriented x 4   Follows Commands (Cognition) WNL   Pain Assessment   Patient Currently in Pain Yes, see Vital Sign flowsheet  (tailbone pain)   Posture    Posture Forward head " position;Protracted shoulders   Range of Motion (ROM)   ROM Comment B UE/LE grossly WFL   Strength (Manual Muscle Testing)   Strength Comments B UE/LE grossly 5/5   Bed Mobility   Comment, (Bed Mobility) supine > sit IND   Transfers   Comment, (Transfers) sit > stand IND   Gait/Stairs (Locomotion)   Comment, (Gait/Stairs) ambulated in room IND, no LOB   Balance   Balance Comments able to maintain standing balance without UE support   Sensory Examination   Sensory Perception patient reports no sensory changes   Coordination   Coordination no deficits were identified   Muscle Tone   Muscle Tone no deficits were identified   Clinical Impression   Criteria for Skilled Therapeutic Intervention Yes, treatment indicated   PT Diagnosis (PT) impaired functional mobility s/p pituitary resection   Influenced by the following impairments decreased activity tolerance   Functional limitations due to impairments impaired gait   Clinical Presentation (PT Evaluation Complexity) Stable/Uncomplicated   Clinical Presentation Rationale clinical judgement   Clinical Decision Making (Complexity) low complexity   Planned Therapy Interventions (PT) balance training;gait training;stair training   Risk & Benefits of therapy have been explained evaluation/treatment results reviewed;care plan/treatment goals reviewed   PT Discharge Planning   PT Discharge Recommendation (DC Rec) home   PT Rationale for DC Rec demonstrated IND with functional mobility in room. anticipate he will IND with functional mobility for household distances during expected length of acute stay.   PT Brief overview of current status transferred OOB IND.  ambulated in room IND. no LOB noted   Plan of Care Review   Plan of Care Reviewed With patient   Total Evaluation Time   Total Evaluation Time (Minutes) 7   Physical Therapy Goals   PT Frequency 2x/week   PT Predicted Duration/Target Date for Goal Attainment 06/05/22   PT Goals Gait;Stairs   PT: Gait Independent;Greater  than 200 feet   PT: Stairs Independent;6 stairs  (no rail)

## 2022-06-03 NOTE — ANESTHESIA POSTPROCEDURE EVALUATION
Patient: Vincenzo Avery    Procedure: Procedure(s):  stealth assisted Redo endoscopic, endonasal approach for resection of recurrent pituitary tumor.         Anesthesia Type:  General    Note:  Disposition: ICU            ICU Sign Out: Unable to perform physician to physician sign out   Postop Pain Control: Uneventful            Sign Out: Well controlled pain   PONV: No   Neuro/Psych: Uneventful            Sign Out: Acceptable/Baseline neuro status (On precedex to avoid pulling at lines)   Airway/Respiratory: Uneventful            Sign Out: Acceptable/Baseline resp. status   CV/Hemodynamics: Uneventful            Sign Out: Acceptable CV status; No obvious hypovolemia; No obvious fluid overload   Other NRE:    DID A NON-ROUTINE EVENT OCCUR? No           Last vitals:  Vitals Value Taken Time   /96 06/02/22 2100   Temp 37  C (98.6  F) 06/02/22 2100   Pulse 87 06/02/22 2105   Resp 10 06/02/22 2105   SpO2 94 % 06/02/22 2105   Vitals shown include unvalidated device data.    Electronically Signed By: Kayden Rojas MD  June 2, 2022  9:06 PM

## 2022-06-03 NOTE — PHARMACY-ADMISSION MEDICATION HISTORY
Admission Medication History Completed by Pharmacy    See [facility] Admission Navigator for allergy information, preferred outpatient pharmacy, prior to admission medications and immunization status.     Medication History Sources:     SureScripts, Patient    Changes made to PTA medication list (reason):    Added: None    Deleted: None    Changed: None    Additional Information:    Will need to delete all medications listed as not taking anything    Prior to Admission medications    Medication Sig Last Dose Taking? Auth Provider   dexamethasone (DECADRON) 1 MG tablet Take 1 tablet (1 mg) by mouth every 8 hours  Patient not taking: Reported on 5/19/2022   Jyoti Ramsey APRN CNP   HYDROcodone-acetaminophen (NORCO) 5-325 MG tablet Take 1 tablet by mouth every 6 hours as needed for severe pain  Patient not taking: Reported on 5/19/2022   Marisela Bernal PA-C   hydrOXYzine (ATARAX) 10 MG tablet Take 1 tablet (10 mg) by mouth 3 times daily as needed for anxiety  Patient not taking: Reported on 5/19/2022   Jyoti Ramsey APRN CNP   levETIRAcetam (KEPPRA) 750 MG tablet Take 1 tablet (750 mg) by mouth 2 times daily  Patient not taking: Reported on 5/19/2022   Jyoti Ramsey APRN CNP   ondansetron (ZOFRAN-ODT) 4 MG ODT tab Take 1-2 tablets (4-8 mg) by mouth every 6 hours as needed for nausea or vomiting  Patient not taking: Reported on 5/19/2022   Jyoti Ramsey APRN CNP   senna-docusate (SENOKOT-S/PERICOLACE) 8.6-50 MG tablet Take 2 tablets by mouth 2 times daily as needed for constipation  Patient not taking: Reported on 5/19/2022   Jyoti Ramsey APRN CNP       Date completed: 06/03/22    Medication history completed by: Hilda Woods, LorieD

## 2022-06-03 NOTE — PLAN OF CARE
Goal Outcome Evaluation: No bleeding from surgical site (nares) this shift.  Pain well controlled with prn oxy.  Pt voiding spontaneously. Pt oob to ambulate to BR without assistance. Neurologically intact/at baseline.  Pt to be transferred to floor this evening, and discharged to home tomorrow.

## 2022-06-03 NOTE — OP NOTE
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    PROCEDURES:     1.  Right-sided nasoseptal flap pedicled from the posterior septal artery, modifier 22.  2.  Endoscopic endonasal approach with right transpterygoid approach to the middle cranial fossa extradural.    SURGEON:  Jo-Ann Green MD    ASSISTANT:  Esequiel Peguero MD    RESIDENT SURGEONS:  Mazin Valdez MD; Ben Montoya MD    ANESTHESIA:  General with orotracheal tube.    ESTIMATED BLOOD LOSS:  1000 mL    INDICATIONS FOR PROCEDURE:  Mr. Avery is a 47-year-old gentleman with a diagnosis of a pituitary macroadenoma.  The patient has undergone 2 prior surgeries to resect this tumor.  One of them was a transnasal endoscopic approach and another transcranial approach.  He was found to have recurrent tumor on the right cavernous sinus and Meckel's cave and he was advised to undergo redo transnasal endoscopic approach to resect this tumor.    FINDINGS:  Septum is in the midline.  There was absence of the right middle turbinate. There is a wide sphenoidotomy.  There was severe scar tissue at the nasal septum, which makes a quite challenging and difficult with the elevation of the right-sided nasoseptal flap.  Please see Dr. Peguero for findings regarding the tumor.     DESCRIPTION OF PROCEDURE:  All the risks and benefits of the procedure were explained to the patient, the patient was taken to the operating room on 06/02/2022.  The patient was placed in the supine position.  General anesthesia was induced and orotracheal tube was placed with no difficulties.  The operating table was turned 180 degrees.  The patient was placed in the Frankel head syed by the Neurosurgery team.  The Stealth guided system was set up and found to be very accurate.   Smart was placed.  SCDs were placed in the lower extremities.  All the intraoperative monitoring leads were applied to the patient's head and right extraocular muscles for monitoring.  At this time, the patient was prepped and draped under sterile fashion for a transnasal endoscopic approach to the right middle cranial fossa.  Following this, timeout was performed by myself and the operating room staff.      I used a 0 degree nasal endoscope to gain access into the nasal cavity.  We initiated our procedure with the outlining of the right-sided nasoseptal flap.  It is important to note that there was a prior posterior septectomy.  The nasoseptal flap was extended into the nasal floor in case we needed more coverage of the skull base defect.  The elevation of the nasoseptal flap was quite difficult due to prior surgery, bilateral scar tissue as well as absence of bony septum, which makes quite difficult the dissection between the mucosa and the mucosa on both sides of the septum.  The nasoseptal flap was elevated and was placed in the nasopharynx for further use.  Following this, we did a right maxillary antrostomy, removing the uncinate process and did an anterior and posterior ethmoidectomy on the right side.  Next, I removed the mucosa of the sphenoid sinus and exposed the skull base defect from the prior operation.  At this time, the right maxillary antrostomy was maximized.  We exposed the posterior wall of the maxillary sinus.  I exposed the jessica ethmoidalis and identified the sphenopalatine artery.  I did remove the jessica ethmoidalis with a Kerrison.  Then, I removed the posterior wall of the maxillary sinus using a combination of Kerrisons as well as bony curettes.  I did expose the sphenopalatine fossa contents.  Following this, I dissected the area of the pterygoid plates and exposed the exit of the V2.     At this time, Dr. Peguero was called into the room and he will be dictating the tumor removal  portion.     Once the tumor was removed, the nasal passages were irrigated with saline solution.  Hemostasis was secured using bipolar as well as Aquamantys.  We did not have any CSF leak during the case, so we decided to use only a free mucosal graft.  The mucosal graft was obtained from the left posterior septal mucosa.  The mucosal graft was laid on the skull base defect as an over layer fashion.  This free graft was secured using small pieces of Surgicel as well as DuraSeal and Gelfoam.  Next, I recovered the right-sided nasoseptal flap and I placed the nasoseptal flap on the right as septum.  I did secure this nasoseptal flap with a 4-0 chromic.  I made sure that the floor of the right-sided nasal cavity was covered by the flaps.  Following this, I applied bilateral Samano splints.  Those were secured with a 2-0 nylon.  At this time, I advanced a nasogastric tube into the patient's stomach and the contents were suctioned.  At this time, the procedure was ended and the patient was handed back to the Neurosurgery team for removal of the Frankel headholder.  The patient was awakened, extubated, and transferred to recovery room under stable conditions.     I was present for the entire length of the case.      Jo-Ann Green MD        D: 2022   T: 2022   MT: [id number]    Name:     TAMMY OMER  MRN:      [id number]        Account:        [id number]   :      1975           Procedure Date: 2022     Document: [id number]

## 2022-06-03 NOTE — PLAN OF CARE
Shift Summary: **Nasal precautions in place** Pt admitted to 3C rm34 from PACU at ~2300. Pt restless and agitated complaining about jaeger catheter and abdominal discomfort. MD aware. Precedex gtt infusing to help with agitation, stopped at 0400. Nasal sling added per order. No complaints of headache. Head CT x2 done during shift.    Assessment:  Neuro: A/O x4. Following commands with equal strength in all extremities. Pt denies numbness and tingling. Eyes remain at baseline. R>L pupil. R pupil sluggish, L pupil brisk. Pupils disconjugate. L eye roving. Pt unable to track, and difficulty when keeping 2 eyes open at same time.  Resp: RA-2L oxymask. Pt denies SOB.  Cardiac: SB/NSR. SBP goal <140, PRN labetalol and hydralazine given (see MAR).  GI: ADAT, tolerating clear liquids. No complaints of nausea.  : Jaeger in place. Pt frequently complaining of sharp pain from jaeger, blood noted when cleaning catheter and pink tinged urine present. Continue with jaeger per MD. Adequate UO.  Skin: Nasal packing, YUDI. Nasal sling in place with gauze, minimal bloody drainage. See previous note for 4 eyes on skin.  Line: PIV x2. R) radial art.  Gtt: MIV    For full assessment and vitals, please see flowsheets.    Plan:  Transfer to floor when stable. Continue to monitor neurological status, UO, and any signs of CSF leak.     Michelle Yeboah RN

## 2022-06-03 NOTE — ANESTHESIA CARE TRANSFER NOTE
Patient: Vincenzo Avery    Procedure: Procedure(s):  stealth assisted Redo endoscopic, endonasal approach for resection of recurrent pituitary tumor. Possible Fascia Irina Graft Possible Abdominal Fat Graft  POSSIBLE INSERTION, DRAIN, SPINE, LUMBAR       Diagnosis: Pituitary macroadenoma (H) [D35.2]  Diagnosis Additional Information: No value filed.    Anesthesia Type:   General     Note:    Oropharynx: oropharynx clear of all foreign objects  Level of Consciousness: awake  Oxygen Supplementation: face mask    Independent Airway: airway patency satisfactory and stable  Dentition: dentition unchanged  Vital Signs Stable: post-procedure vital signs reviewed and stable  Report to RN Given: handoff report given  Patient transferred to: PACU    Handoff Report: Identifed the Patient, Identified the Reponsible Provider, Reviewed the pertinent medical history, Discussed the surgical course, Reviewed Intra-OP anesthesia mangement and issues during anesthesia, Set expectations for post-procedure period and Allowed opportunity for questions and acknowledgement of understanding      Vitals:  Vitals Value Taken Time   BP     Temp     Pulse     Resp     SpO2 97 % 06/02/22 1938   Vitals shown include unvalidated device data.    Electronically Signed By: YULIYA Pearson CRNA  June 2, 2022  7:39 PM

## 2022-06-03 NOTE — PROGRESS NOTES
Admitted/transferred from: PACU  Reason for admission/transfer: Post-Op monitoring  2 RN skin assessment: completed by Michelle LAGOS RN and Gloria DIAZ RN  Result of skin assessment and interventions/actions: Nasal incision-nasal sling added. Sacral mepilex in place. Scattered cuts and bruises.  Height, weight, drug calc weight: Done  Patient belongings (see Flowsheet)  MDRO education added to care planN/A  ?

## 2022-06-03 NOTE — PROGRESS NOTES
[facility], [place]   Neurosurgery Progress Note:    Assessment: Vincenzo Avery is a 47 year old male with known large pituitary macroadenoma s/p 2 previous resections who presented to Pituitary Clinic with evidence of growth of residual tumor on serial MRI.  In its growth, the tumor made contact with the ophthalmic segment of the right ICA and encased the cavernous segment.  On 6/2, he underwent repeat endonasal endoscopic approach for pituitary macroadenoma resection by Elena Neely and Sudhir.  He was admitted to  postoperatively due to 4A overflow.  He feels well    Plan:  - Serial neuro exams  - Pain control  - Strict I/O  - Monitor for signs of CSF leak (e.g., clear rhinorrhea)  - Monitor for signs of brisk epistaxis--contact Neurosurgery and ENT residents on-call immediately if brisk epistaxis is noted  - Ancef prophylaxis  - Follow up postop head CT and POD#1 head CT  - Cortef taper  - HOB > 30 degrees  - Advance diet as tolerated  - Bowel regimen  - PRN antiemetics  - IVF until taking adequate PO  - PT/OT  - SCDs for DVT proph    Greatly appreciate the help from PT/OT in caring for Vincenzo Avery    -----------------------------------  Tonny Perry MD  Neurosurgery PGY-3    Interval History/Subjective: Doing well with neurologic exam consistent with preop.  Tumor resection aborted due to bleeding from branch of right carotid artery, packed intraoperatively.  Delay in postop STAT head CT.    Objective:   Temp:  [97.4  F (36.3  C)-98.6  F (37  C)] 98.6  F (37  C)  Pulse:  [] 82  Resp:  [8-29] 16  BP: (110-157)/() 157/104  MAP:  [87 mmHg-105 mmHg] 99 mmHg  Arterial Line BP: (111-147)/(69-80) 132/79  SpO2:  [92 %-100 %] 96 %  I/O last 3 completed shifts:  In: 6400 [I.V.:5300]  Out: 3615 [Urine:3615]    Gen: Appears comfortable, NAD  Wound: endonasal  Neurologic:  - Alert & Oriented to person, place, time, and situation  - Follows commands briskly  - Speech  fluent, spontaneous. No aphasia or dysarthria.  - No gaze preference. No apparent hemineglect.  - Left pupil reactive to light. Right pupil 4mm and fixed.  - Left EOMI. Right eye with small movements, though mostly fixed midline.  - Strong brow raise, eye closure, and smile  - Face symmetric with sensation intact to light touch; diminished sensation to right touch in right forehead around site of previous surgical scar  - Trapezii and sternocleidomastoid muscles 5/5 bilaterally  - No pronator drift     Del Tr Bi WE WF Gr   R 5 5 5 5 5 5   L 5 5 5 5 5 5    HF KE KF DF PF EHL   R 5 5 5 5 5 5   L 5 5 5 5 5 5     Sensation intact and symmetric to light touch throughout    LABS  Recent Labs   Lab Test 06/02/22 2052 06/02/22  1803 06/02/22  1702 06/02/22  0920 03/26/21  0547 03/25/21  0312   WBC 6.8  --   --   --  8.2 10.1   HGB 10.1* 10.2* 9.1*   < > 9.6* 9.0*   MCV 89  --   --   --  94 92     --   --   --  170 158    < > = values in this interval not displayed.       Recent Labs   Lab Test 06/02/22 2254 06/02/22 1803 06/02/22  1702 06/02/22  1605 06/02/22  0625 03/26/21  0547 03/25/21  0312 03/24/21  0325   NA  --  140 141 142   < > 141 141 144   POTASSIUM  --  4.6 4.4 4.3   < > 4.1 3.9 3.5   CHLORIDE  --   --   --   --   --  111* 109 116*   CO2  --   --   --   --   --  23 26 24   BUN  --   --   --   --   --  26 22 16   CR  --   --   --   --   --  0.81 0.85 0.75   ANIONGAP  --   --   --   --   --  7 6 5   KANA  --   --   --   --   --  8.6 8.2* 7.4*   * 139* 129* 138*   < > 104* 110* 116*    < > = values in this interval not displayed.       IMAGING  Recent Results (from the past 24 hour(s))   MR Brain w Contrast    Narrative    Brain MRI with/without contrast primarily for the purposes of  stereotactic evaluation    History: Stealth MRI Brain with contrast and with Fiducials morning of  surgery; Pituitary macroadenoma (H)  ICD-10: Pituitary macroadenoma (H)    Comparison: 3/23/2021    Technique: MR  imaging performed with 3-dimensonally acquired axial  T1-weighted sequences performed with intravenous contrast.    Contrast: 10mL Gadavist    Findings: Limited imaging for stereotactic imaging demonstrates stable  right parasellar mass and overlying pterional approach overlying  postsurgical changes. The mass is again seen to encase portion of the  right cavernous internal carotid artery and the majority of the right  supraclinoid internal carotid artery segment up to the carotid  terminus. Stable focal mass effect. No midline shift or hydrocephalus.  The major intracranial arterial structures are grossly patent.      Impression    Impression: Stable right parasellar mass and overlying postsurgical  changes. Limited imaging primarily for the purposes of stereotactic  localization.     I have personally reviewed the examination and initial interpretation  and I agree with the findings.    RIGO ARTEAGA MD         SYSTEM ID:  [id number]   CT Head w/o Contrast    Narrative    Stealth CT imaging for purposes of stereotactic evaluation    Provided History: Stealth CT Head without contrast and WITH Fiducial's  morning of procedure; Pituitary macroadenoma (H)  ICD-10: Pituitary macroadenoma (H)  Comparison: 5/2/2022, 3/23/2021    Technique: CT imaging performed with axial, sagittal, and coronal  reconstructed images obtained without intravenous contrast.    Contrast: None    Findings: Limited imaging for stereotactic localization demonstrates  stable residual right parasellar mass centered within the right  cavernous sinus and middle cranial fossa  measuring up to 4 cm and  right pterional approach craniotomy changes and right anterior  temporal lobe encephalomalacia. Stable mass effect. No midline shift,  or acute intracranial hemorrhage.  The ventricles are not enlarged, and there is no evidence of  hydrocephalus.      Impression    Impression: No acute intracranial pathology. Stable right residual  parasellar mass  and overlying postsurgical changes. Limited imaging  performed primarily for the purposes of stereotactic localization.     I have personally reviewed the examination and initial interpretation  and I agree with the findings.    RIGO ARTEAGA MD         SYSTEM ID:  [id number]   CTA Head Neck with Contrast    Narrative    CTA  HEAD NECK WITH CONTRAST 6/2/2022 8:50 AM    CT angiogram of the neck   CT angiogram of the base of the brain with contrast  Reconstruction on the 3D workstation    Provided History:  History of pituitary adenoma status post resection  6/2018 and 3/2021 now with residual growth scheduled for pituitary  macroadenoma resection.  ICD-10:    Comparison:  MR brain 3/23/2021.      Technique:  HEAD and NECK CTA: During rapid bolus intravenous injection of  nonionic contrast material, axial images were obtained using thin  collimation multidetector helical technique from the base of the upper  aortic arch through the [place]. This CT angiogram data was  reconstructed at thin intervals with mild overlap. Images were sent to  the 3D workstation, and 3D reconstructions were obtained. The axial  source images, multiplanar reformations, 3D reconstructions in both  maximum intensity projection display and volume rendered models were  reviewed, with reconstructions performed by the technologist.    Contrast: 75 cc of isovue 370    Findings:    Head CTA demonstrates no aneurysm or stenosis of the major  intracranial arteries. Postoperative changes of right frontotemporal  craniotomy. There is residual hyperdense right parasellar mass  measuring approximately 4.0 x 2.3 x 3.3 cm, not significantly changed.  There is complete encasement of the distal cavernous, ophthalmic and  proximal supraclinoid portions of the right internal carotid artery.  There is fetal origin of the left posterior cerebral artery. Right  posterior communicating artery is not well-visualized. Anterior  communicating artery  appears patent. Right dominant vertebrobasilar  system. Bilateral maxillary sinus mucous retention cyst.    Neck CTA demonstrates no stenosis of the major cervical arteries. The  origins of the great vessels from the aortic arch are patent. The  normal distal right internal carotid artery measures 5 mm. The left  vertebral artery is hypoplastic. The normal distal left internal  carotid artery measures 5 mm.     No mass within the visualized portions of the cervical soft tissues or  lung apices.       Impression    Impression:    1. Head CTA demonstrates no aneurysm or stenosis of the major  intracranial arteries.   2. Redemonstrated 3.5 cm right parasellar mass with complete  encasement of the cavernous and supraclinoid portions of the right  internal carotid artery.  3. Neck CTA demonstrates no stenosis of the major cervical arteries.    I have personally reviewed the examination and initial interpretation  and I agree with the findings.    RIGO ARTEAGA MD         SYSTEM ID:  [id number]

## 2022-06-03 NOTE — OR NURSING
Family called 3C from family malik asking to put patients phone,  cord and handheld phone with his belongings. Writer was able to locate patients belongings in PACU locker. Items placed in bed along with note from family.  
[place] paged regarding pt pupil sizes R>L, sluggish R and brisk L    [place] called back, these are expected findings.   
27-Feb-2022 01:49

## 2022-06-03 NOTE — PROGRESS NOTES
"Otolaryngology Progress Note  Yulisa 3, 2022    S: No acute events overnight. No rhinorrhea or metallic taste in mouth.    O: BP (!) 152/100   Pulse 84   Temp 97.9  F (36.6  C) (Axillary)   Resp 11   Ht 1.753 m (5' 9\")   Wt 97.6 kg (215 lb 2.7 oz)   SpO2 92%   BMI 31.77 kg/m     General: Alert and oriented x 3, No acute distress   HEENT: No nasal drainage. Posterior oropharynx is clear. Dilated minimally reactive right pupil, EOM essentially fixed on the right. Sensation intact to light touch in V1-V3 distribution. HB 1/6. Tongue protrudes midline. Strong shoulder shrug.    Pulmonary: Breathing non-labored, no stridor, no accessory muscle use.      Intake/Output Summary (Last 24 hours) at 6/3/2022 0604  Last data filed at 6/3/2022 0500  Gross per 24 hour   Intake 7487.52 ml   Output 5265 ml   Net 2222.52 ml       LABS:  ROUTINE IP LABS (Last four results)  BMP  Recent Labs   Lab 06/03/22 0029 06/02/22 2254 06/02/22 1803 06/02/22  1702 06/02/22  1605 06/02/22  1517   NA  --   --  140 141 142 141   POTASSIUM 4.0  --  4.6 4.4 4.3 4.3   CR 0.84  --   --   --   --   --    GLC  --  139* 139* 129* 138* 141*     CBC  Recent Labs   Lab 06/02/22 2052 06/02/22 1803 06/02/22  1702 06/02/22  1605   WBC 6.8  --   --   --    RBC 3.37*  --   --   --    HGB 10.1* 10.2* 9.1* 11.0*   HCT 30.1*  --   --   --    MCV 89  --   --   --    MCH 30.0  --   --   --    MCHC 33.6  --   --   --    RDW 13.6  --   --   --      --   --   --      INRNo lab results found in last 7 days.    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 Right naso-septal flap placed carrington into the septum. Right frontotemporal craniotomy with zygomatic osteotomy to resect pituitary tumor on 3/26/2021, who is POD#1 from endoscopic endonasal right transpterygoid resection of recurrent extradural pituitary tumor on 6/2/2022. Will plan to monitor for CSF leak and epistaxis/hemorrhage.    Sinus Precautions:   - Do " not use a straw  - Do not blow your nose  - Do not strain (take stool softeners)  - Open your mouth when you sneeze  - No lifting greater than 20 lbs  - Try not to lean forward and allow blood to build up in your head.    -Bowel Regimen  -Nasal Saline on Discharge  -Do not use CPAP until seen by Dr. Green in clinic  -contact ENT with any questions or concerns      -- Patient and above plan discussed with Dr. Peter Galvan, MS4      ADDENDUM: Note has been reviewed and edited by me.     Mazin Valdez MD  Otolaryngology-Head & Neck Surgery PGY-4  Please contact ENT by dialing [phone] and entering job code [phone].

## 2022-06-03 NOTE — OP NOTE
Procedure Date: 06/02/2022    PREOPERATIVE DIAGNOSIS:  Cavernous sinus/Meckel's cave invasive, recurrent pituitary adenoma    POSTOPERATIVE DIAGNOSIS:  Cavernous sinus/Meckel's cave invasive, recurrent pituitary adenoma    PROCEDURES PERFORMED:  1.  Endoscopic endonasal right transpterygoid approach to the middle fossa (extradural)  2.  Redo resection of middle fossa tumor (extradural)  3.  Neuronavigation  4.  Neuromonitoring (SSEP, EEG, right CN 3-6 on the NIM)    ATTENDING SURGEON:  Esequiel Peguero MD    CO-SURGEON:  Jo-Ann Siegel MD    ASSISTANTS: Rolf Neely MD, Ben Montoya MD    ANESTHESIA:  General.    ESTIMATED BLOOD LOSS:  1000 mL    INTRAOPERATIVE FINDINGS:  Dense fibrous tumor that was adherent to all cavernous structures.  Subtotal resection was achieved.  A small arteriole in the lateral cavernous sinus branch was tamponaded for hemostasis. No changes in neuro monitoring.    INDICATIONS FOR PROCEDURE:  Mr. Avery is a 47-year-old male with a known history of pituitary macroadenoma.  The patient has undergone 2 prior resections for this tumor.  First was an endoscopic endonasal approach and the second was a transcranial approach by Dr. Neely.  Following the second operation, he developed a complete third nerve palsy.  We have been following him in clinic due to a growing recurrent cavernous segment of tumor.  This has continued to grow over interval scans.  He was not experiencing new neurological symptoms, however, given his young age and continued growth of the sizable residual tumor, he was indicated for the aforementioned procedure.  After a thorough conversation of the risks and benefits of surgery, he elected to proceed with the offered surgical intervention. Dr. Neely requested my participation in this surgery due to its challenging location so that we may work together for resection.    DESCRIPTION OF PROCEDURE:  The patient was marked and consented in the preoperative area.  He  was brought to the operating room where general anesthesia was induced and the patient was intubated.  An arterial line was placed.  A Smart was placed.  The head of the bed was rotated 180 degrees, away from our Anesthesia colleagues.  A Frankel headholder was attached to the patient's head behind the location of his prior craniotomy, and then the Frankel was connected to the bed.  We placed his head in slight extension and turned his head to the right. Neuro monitoring leads were placed for SSEP and EEG monitoring by the [employer] team.  N[employer]asive then also placed cranial nerves 3, 4, V3, 6  leads that were connected to the NIM.  The patient's preoperative head CT, CT angiogram, and MRI were then loaded into the Stealth software.  The images were merged.  The Stealth software was then registered to the patient's face with good registration.  We then also planned a possible left fascia valerio and abdominal fat graft sites.  The patient was then prepped and draped in normal sterile fashion.  At this point in the case, our ENT colleagues began the approach.  They performed the initial dissection and exposure, including the harvest of a right nasoseptal flap.  Please refer to their note for details regarding this portion of the procedure.     Once the initial approach was performed, Neurosurgery was requested to return to the case and we scrubbed in.  Using the Stealth navigation, we were able to identify the midline as well as the sella, which lacked overlying bone from prior approach.  Given that the vascular pedicle was sacrificed for a left nasoseptal flap during the prior surgery, our ENT colleagues had harvested a right nasoseptal flap.  We identified the right pterygoid as well as ethmoid regions using Stealth navigation.  Using the high-speed keiry fred, these ridges were then drilled down flush.  We again checked the residual bone using Stealth navigation and identified areas over the pterygoid that could be  continually thinned.  This was then brought down to the eggshell using the high speed drill and flicked off using Kerrison punches.  In doing so, we unroofed the orbital apex, parasellar right carotid artery, superior orbital fissure, and middle fossa into the cavernous sinus and Meckel's cave. The Doppler was then brought to the field and we were able to detect the carotid artery through the expanded tumor.  Specifically, the anterior genu of the right cavernous segment was closest to the dura and we could visualize that through the dura.  This was confirmed using Stealth navigation.  Once we were confident that we had enough exposure, we then began to mobilize mucosa of the flap laterally and we were able to identify the vidian nerve which was sacrificed while being able to preserve the vascular pedicle to the right nasoseptal flap.  With our bony decompression now complete, we then brought the Doppler back to the field and again traced out the trajectory of the carotid artery.  We then also brought the Stealth navigation back to the field and again located the carotid artery as well as confirmed that our bony decompression was complete.      Approach the approach was completed, we then proceeded with tumor resection. Using scissors, we then opened the dural leaflet over the anterior compartment of the cavernous sinus.  Using a feather blade, this was then widened in a rectangular fashion.  We then began to dissect tumor and noted that it was very thick and fibrous.  Several samples of tumor were collected using a pituitary and sent to the lab for permanent pathology.  Due to the thick fibrous bands, we frequently switched between excising the tumor with scissors and removing it with the pituitary and suctioning small pockets of soft tumor.  We would frequently bring the Doppler back to the field as well as the Stealth navigation to confirm our trajectory as well as continue to identify the carotid artery.  The  feather blade was used to extend our opening up more superiorly along the side of the carotid artery.  We began to dissect over the genu and identified the carotid artery cleanly.  It was evident that the tumor was adherent to the carotid artery from the level of the proximal dural ring and inferiorly. We mobilized the carotid artery medially so that we could work into the lateral cavernous sinus compartment. We then began to dissect fibrous tumor along the lateral edge of the carotid artery, again looking for a plane, however, again we were unable to do so as it was adherent along this edge as well.  We began to work out more laterally as this was away from the carotid artery.  We did identify what seemed like a plane along the lateral margin of the tumor.  Using an elevator, we were mobilizing this.  At this time we were notified by our Anesthesia colleagues that the patient had bradycardia to the 20s, suggestive of a trigeminocardiac reflex. His heart rate returned to normal when our dissection was stopped.  We attempted mobilizing this tumor one more time and again the patient experienced bradycardia.  There was no change in our neuro monitoring status.  We continued to debulk with scissors as we were able to visualize adherent, fibrous tumor.  Cavernous venous bleeding was encountered and controlled using slurry.  Once out of our visual pathway, we attempted to use 30-degree endoscopes, however, we were not able to resect out further laterally due to our inability to visualize around the corner of the temporal lobe dura.  Additional mobilization of the tissues for greater exposure was not possible due to the flap pedicle being located on the right side.  We began to then work further down along the lateral edge of the carotid using a combination of sharp and blunt dissection.  While worked in this region, we encountered a arteriole in the lateral cavernous sinus that was partially cauterized, and eventually  packed off.  This area was then packed off using a combination of Gelfoam, cotton mesh, and Eviseal.  This was allowed to tamponade for several minutes and we performed a Valsalva and no ongoing bleeding was noted.  Given the fibrous and adherent nature of the tumor and limitations of the right nasoseptal flap, we decided to stop tumor resection at this point.    At this point in the procedure, our ENT colleagues then returned to the case and performed a multilayer closure.  No CSF leak was encountered. Once the closure was completed, Neurosurgery returned to the case and the Fraknel was removed from the bed and then from the patient's head.  The patient was then rotated 180 degrees back towards our Anesthesia colleagues where general anesthesia was very gently reversed and he was extubated.  The patient was then transferred back to a hospital bed and taken to the postoperative care area for further postoperative cares.  All counts were correct at the end of the procedure x 2.  Dr. Neely spoke with the patient's family to update them upon conclusion of the procedure. Dr. Esequiel Peguero was present and scrubbed throughout all critical portions of the case and otherwise immediately available.          Esequiel Peguero MD    As Dictated by MAGGIE BETH MD        D: 2022   T: 2022   MT: [id number]    Name:     TAMMY OMER  MRN:      [id number]        Account:        [id number]   :      1975           Procedure Date: 2022     Document: [id number]      I was present and scrubbed throughout all critical portions of the neurosurgical portion of the case and otherwise immediately available.    Esequiel Peguero MD

## 2022-06-03 NOTE — PLAN OF CARE
OT/3C: DEFER. Per PT, pt mobilizing IND and does not present w/ any ADL challenges or cog changes. PT to follow to progress functional endurance and OT to sign off. Please consult again if new needs arise.

## 2022-06-04 VITALS
HEIGHT: 69 IN | DIASTOLIC BLOOD PRESSURE: 73 MMHG | SYSTOLIC BLOOD PRESSURE: 126 MMHG | BODY MASS INDEX: 31.87 KG/M2 | RESPIRATION RATE: 16 BRPM | OXYGEN SATURATION: 97 % | HEART RATE: 56 BPM | TEMPERATURE: 97.9 F | WEIGHT: 215.17 LBS

## 2022-06-04 LAB
ANION GAP SERPL CALCULATED.3IONS-SCNC: 4 MMOL/L (ref 3–14)
BUN SERPL-MCNC: 16 MG/DL (ref 7–30)
CALCIUM SERPL-MCNC: 8.2 MG/DL (ref 8.5–10.1)
CHLORIDE BLD-SCNC: 113 MMOL/L (ref 94–109)
CO2 SERPL-SCNC: 25 MMOL/L (ref 20–32)
CREAT SERPL-MCNC: 0.81 MG/DL (ref 0.66–1.25)
ERYTHROCYTE [DISTWIDTH] IN BLOOD BY AUTOMATED COUNT: 14 % (ref 10–15)
GFR SERPL CREATININE-BSD FRML MDRD: >90 ML/MIN/1.73M2
GLUCOSE BLD-MCNC: 89 MG/DL (ref 70–99)
HCT VFR BLD AUTO: 29 % (ref 40–53)
HGB BLD-MCNC: 9.6 G/DL (ref 13.3–17.7)
MAGNESIUM SERPL-MCNC: 2.4 MG/DL (ref 1.6–2.3)
MCH RBC QN AUTO: 30.2 PG (ref 26.5–33)
MCHC RBC AUTO-ENTMCNC: 33.1 G/DL (ref 31.5–36.5)
MCV RBC AUTO: 91 FL (ref 78–100)
PHOSPHATE SERPL-MCNC: 2.1 MG/DL (ref 2.5–4.5)
PLATELET # BLD AUTO: 159 10E3/UL (ref 150–450)
POTASSIUM BLD-SCNC: 3.6 MMOL/L (ref 3.4–5.3)
RBC # BLD AUTO: 3.18 10E6/UL (ref 4.4–5.9)
SODIUM SERPL-SCNC: 142 MMOL/L (ref 133–144)
WBC # BLD AUTO: 7.9 10E3/UL (ref 4–11)

## 2022-06-04 PROCEDURE — 80048 BASIC METABOLIC PNL TOTAL CA: CPT | Performed by: STUDENT IN AN ORGANIZED HEALTH CARE EDUCATION/TRAINING PROGRAM

## 2022-06-04 PROCEDURE — 250N000013 HC RX MED GY IP 250 OP 250 PS 637: Performed by: STUDENT IN AN ORGANIZED HEALTH CARE EDUCATION/TRAINING PROGRAM

## 2022-06-04 PROCEDURE — 85027 COMPLETE CBC AUTOMATED: CPT | Performed by: STUDENT IN AN ORGANIZED HEALTH CARE EDUCATION/TRAINING PROGRAM

## 2022-06-04 PROCEDURE — 84100 ASSAY OF PHOSPHORUS: CPT | Performed by: STUDENT IN AN ORGANIZED HEALTH CARE EDUCATION/TRAINING PROGRAM

## 2022-06-04 PROCEDURE — 250N000013 HC RX MED GY IP 250 OP 250 PS 637

## 2022-06-04 PROCEDURE — 36415 COLL VENOUS BLD VENIPUNCTURE: CPT | Performed by: STUDENT IN AN ORGANIZED HEALTH CARE EDUCATION/TRAINING PROGRAM

## 2022-06-04 PROCEDURE — 250N000011 HC RX IP 250 OP 636: Performed by: STUDENT IN AN ORGANIZED HEALTH CARE EDUCATION/TRAINING PROGRAM

## 2022-06-04 PROCEDURE — 83735 ASSAY OF MAGNESIUM: CPT | Performed by: STUDENT IN AN ORGANIZED HEALTH CARE EDUCATION/TRAINING PROGRAM

## 2022-06-04 RX ORDER — HYDROCORTISONE 10 MG/1
TABLET ORAL
Qty: 30 TABLET | Refills: 1 | Status: SHIPPED | OUTPATIENT
Start: 2022-06-04

## 2022-06-04 RX ORDER — POLYETHYLENE GLYCOL 3350 17 G/17G
17 POWDER, FOR SOLUTION ORAL DAILY
Qty: 510 G | Refills: 0 | Status: SHIPPED | OUTPATIENT
Start: 2022-06-04 | End: 2022-06-11

## 2022-06-04 RX ORDER — POLYETHYLENE GLYCOL 3350 17 G/17G
17 POWDER, FOR SOLUTION ORAL 3 TIMES DAILY
Status: DISCONTINUED | OUTPATIENT
Start: 2022-06-04 | End: 2022-06-04 | Stop reason: HOSPADM

## 2022-06-04 RX ORDER — AMOXICILLIN 250 MG
3 CAPSULE ORAL 2 TIMES DAILY
Status: DISCONTINUED | OUTPATIENT
Start: 2022-06-04 | End: 2022-06-04 | Stop reason: HOSPADM

## 2022-06-04 RX ORDER — OXYCODONE HYDROCHLORIDE 5 MG/1
2.5-5 TABLET ORAL EVERY 4 HOURS PRN
Qty: 12 TABLET | Refills: 0 | Status: SHIPPED | OUTPATIENT
Start: 2022-06-04 | End: 2022-06-07

## 2022-06-04 RX ADMIN — Medication 5 MG: at 07:31

## 2022-06-04 RX ADMIN — DOCUSATE SODIUM 50 MG AND SENNOSIDES 8.6 MG 1 TABLET: 8.6; 5 TABLET, FILM COATED ORAL at 07:32

## 2022-06-04 RX ADMIN — Medication 2 G: at 06:07

## 2022-06-04 RX ADMIN — ACETAMINOPHEN 325MG 650 MG: 325 TABLET ORAL at 07:31

## 2022-06-04 RX ADMIN — LEVETIRACETAM 750 MG: 750 TABLET, FILM COATED ORAL at 07:32

## 2022-06-04 RX ADMIN — POLYETHYLENE GLYCOL 3350 17 G: 17 POWDER, FOR SOLUTION ORAL at 07:32

## 2022-06-04 ASSESSMENT — ACTIVITIES OF DAILY LIVING (ADL)
ADLS_ACUITY_SCORE: 22

## 2022-06-04 NOTE — PLAN OF CARE
Status: 6/2 endoscopic endonasal right transpterygoid resection of recurrent extradural pituitary tumor   Vitals: VSS   Neuros: A/Ox4. Denies salty/metallic taste. R eye <left and sluggish. Photophobic. Scant amount of serosang drainage from nares. Nasal sling in place.  IV: PIV -SL  Diet: Regular. strict I&O's  Bowel status: No BM   : Voiding sometimes using urinal for us to track   Skin: Nasal sling and packing in nares   Pain: denies pain  Activity: Up ad isabela  Plan: Continue to follow POC. Discharge this AM

## 2022-06-04 NOTE — PLAN OF CARE
Status: Pt is POD#1 from endoscopic endonasal right transpterygoid resection of recurrent extradural pituitary tumor on 6/2/2022.  Vitals: VSS on RA  Neuros: AOx4. Denies salty/metallic taste. R eye dilated and sluggish. Photophobic. Denies vision changes. Scant amount of serosang drainage from nares. Nasal sling in place.  IV: PIV TKO in between abx  Labs/Electrolytes: K+ replaced on days, recheck in the am  Resp/trach: LS clear, diminished in LL  Diet: Regular  Bowel status: No BM this shift  : Voiding without issue - educated patient on importance of tracking I/Os  Skin: Nasal sling and packing in nares WNL  Pain: Pain controlled by prn oxy and prn tylenol.  Activity: Up ad isabela  Social: No visitors this shift  Plan: Continue to monitor and follow POC. Discharge tomorrow.

## 2022-06-04 NOTE — PROGRESS NOTES
"Otolaryngology Progress Note  June 4, 2022    S: No acute events overnight. No rhinorrhea or metallic taste in mouth. Patient endorses some sense of dripping out of nose.     O: /73 (BP Location: Right arm)   Pulse 56   Temp 97.9  F (36.6  C) (Oral)   Resp 16   Ht 1.753 m (5' 9\")   Wt 97.6 kg (215 lb 2.7 oz)   SpO2 97%   BMI 31.77 kg/m     General: Alert and oriented x 3, No acute distress   HEENT: No nasal drainage. Posterior oropharynx is clear. Given sensation of dripping patient was sat forward for 90 seconds. No anterior nasal drainage noted.    Pulmonary: Breathing non-labored, no stridor, no accessory muscle use.    LABS:  ROUTINE IP LABS (Last four results)  BMP  Recent Labs   Lab 06/03/22  0608 06/03/22  0029 06/02/22  2254 06/02/22  1803 06/02/22  1702 06/02/22  1605     --   --  140 141 142   POTASSIUM 3.8 4.0  --  4.6 4.4 4.3   CHLORIDE 112*  --   --   --   --   --    KANA 8.1*  --   --   --   --   --    CO2 24  --   --   --   --   --    BUN 14  --   --   --   --   --    CR 0.80 0.84  --   --   --   --    *  --  139* 139* 129* 138*     CBC  Recent Labs   Lab 06/04/22  0751 06/03/22  0608 06/02/22 2052 06/02/22  1803   WBC 7.9 9.6 6.8  --    RBC 3.18* 3.23* 3.37*  --    HGB 9.6* 9.8* 10.1* 10.2*   HCT 29.0* 28.7* 30.1*  --    MCV 91 89 89  --    MCH 30.2 30.3 30.0  --    MCHC 33.1 34.1 33.6  --    RDW 14.0 13.6 13.6  --     158 182  --      A/P: Vincenzo Avery is a 47 year old male with a 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 Right naso-septal flap placed carrington into the septum. Right frontotemporal craniotomy with zygomatic osteotomy to resect pituitary tumor on 3/26/2021, who is POD#2 from endoscopic endonasal right transpterygoid resection of recurrent extradural pituitary tumor on 6/2/2022. Will plan to monitor for CSF leak and epistaxis/hemorrhage. No signs of CSF leak at this time.     Sinus Precautions:   - Do not use a straw  - Do not blow your nose  - Do not " strain (take stool softeners)  - Open your mouth when you sneeze  - No lifting greater than 20 lbs  - Try not to lean forward and allow blood to build up in your head.  -Bowel Regimen  -Nasal Saline on Discharge  -Do not use CPAP until seen by Dr. Green in clinic  -contact ENT with any questions or concerns      -- Patient and above plan discussed with Dr. Peter Galvan, MS4    ADDENDUM: Note has been reviewed and edited by me.     Mike Jose MD  Otolaryngology-Head & Neck Surgery PGY-1  Please contact ENT by dialing * [phone] and entering job code [phone].

## 2022-06-05 NOTE — DISCHARGE SUMMARY
[facility] Discharge Summary and Instructions    Vincenzo Avery MRN# [id number]   Age: 47 year old YOB: 1975     Date of Admission:  6/2/2022  Date of Discharge::  6/4/2022  1:15 PM  Admitting Physician:  Rolf Neely MD  Discharge Physician:  Rolf Neely MD          Admission Diagnoses:   Pituitary macroadenoma (H) [D35.2]  Pituitary tumor [D49.7]          Discharge Diagnosis:     Pituitary macroadenoma (H) [D35.2]  Pituitary tumor [D49.7]       In addition to the assessment of diagnoses detailed above, this 47 year old male  patient admitted electively has the following conditions contributing to the complexity of their medical care:    Pituitary disorder ,  Acute blood loss anemia requiring blood transfusion         Procedures:   6/2/22  1.  Endoscopic endonasal right transpterygoid approach to the middle fossa (extradural)  2.  Redo resection of middle fossa tumor (extradural)  3.  Neuronavigation  4.  Neuromonitoring (SSEP, EEG, right CN 3-6 on the NIM)           Brief History of Illness:   Mr. Avery is a 47-year-old male with a known history of pituitary macroadenoma.  The patient has undergone 2 prior resections for this tumor.  First was an endoscopic endonasal approach and the second was a transcranial approach by Dr. Neely.  Following the second operation, he developed a complete third nerve palsy. He has been followed in clinic due to a growing recurrent cavernous segment of tumor. This has continued to grow over interval scans.  He was not experiencing new neurological symptoms, however, given his young age and continued growth of the sizable residual tumor, he was indicated for the aforementioned procedure.  After a thorough conversation of the risks and benefits of surgery, he elected to proceed with the offered surgical intervention with Dr. Neely and Dr. Peguero.             Hospital Course:   Vincenzo Avery is a 47 year old male with  known large pituitary macroadenoma s/p 2 previous resections who presented to Pituitary Clinic with evidence of growth of residual tumor on serial MRI.  In its growth, the tumor made contact with the ophthalmic segment of the right ICA and encased the cavernous segment.  On 6/2, he underwent repeat endonasal endoscopic approach for pituitary macroadenoma resection by Elena Neely and Sudhir. Due to intraoperative arterial bleeding, likely from an arteriole branch of the carotid, he was admitted to the ICU for close monitoring and blood pressure control. He received 1 unit of pRBCs transfused intraoperatively due to acute blood loss and 2 point drop in hemoglobin. He was then transferred to  on POD#1.  He feels well currently.  Thus far, he has demonstrated no signs of DI, CSF leak, or arterial epistaxis.          Physical Exam:    Gen: Appears comfortable, NAD  Wound: endonasal  Neurologic:  - Alert & Oriented to person, place, time, and situation  - Follows commands briskly  - Speech fluent, spontaneous. No aphasia or dysarthria.  - No gaze preference. No apparent hemineglect.  - Left pupil reactive to light. Right pupil 4mm and fixed.  - Left EOMI. Right eye with small movements, though mostly fixed midline, consistent with 3rd nerve palsy.  - Strong brow raise, eye closure, and smile  - Face symmetric with sensation intact to light touch; diminished sensation to right touch in right forehead around site of previous surgical scar  - Trapezii and sternocleidomastoid muscles 5/5 bilaterally  - No pronator drift       Del Tr Bi WE WF Gr   R 5 5 5 5 5 5   L 5 5 5 5 5 5     HF KE KF DF PF EHL   R 5 5 5 5 5 5   L 5 5 5 5 5 5      Sensation intact and symmetric to light touch throughout         Discharge Medications:     Discharge Medication List as of 6/4/2022 12:21 PM      START taking these medications    Details   hydrocortisone (CORTEF) 10 MG tablet Taper dose: 6/4: 40mg am, 20 mg pm 6/5: 20 mg am, 10mg pm  6/6: 10mg am, 5mg pm - this dose to be continued until follow up with endocrinology, Disp-30 tablet, R-1, E-Prescribe      oxyCODONE (ROXICODONE) 5 MG tablet Take 0.5-1 tablets (2.5-5 mg) by mouth every 4 hours as needed for moderate to severe pain, Disp-12 tablet, R-0, Local Print      polyethylene glycol (MIRALAX) 17 GM/Dose powder Take 17 g by mouth daily for 7 days, Disp-510 g, R-0, E-Prescribe         CONTINUE these medications which have NOT CHANGED    Details   HYDROcodone-acetaminophen (NORCO) 5-325 MG tablet Take 1 tablet by mouth every 6 hours as needed for severe pain, Disp-20 tablet, R-0, E-Prescribe      hydrOXYzine (ATARAX) 10 MG tablet Take 1 tablet (10 mg) by mouth 3 times daily as needed for anxiety, Disp-30 tablet, R-1, Local Print      levETIRAcetam (KEPPRA) 750 MG tablet Take 1 tablet (750 mg) by mouth 2 times daily, Disp-8 tablet, R-0, Local PrintIndication: Prevention of Seizures Post-Operative Take 1 Tablet 2 times daily through 3/30/2021, then stop.      ondansetron (ZOFRAN-ODT) 4 MG ODT tab Take 1-2 tablets (4-8 mg) by mouth every 6 hours as needed for nausea or vomiting, Disp-30 tablet, R-1, Local PrintIndication: Nausea and Vomiting      senna-docusate (SENOKOT-S/PERICOLACE) 8.6-50 MG tablet Take 2 tablets by mouth 2 times daily as needed for constipation, Disp-60 tablet, R-1, Local PrintIndication: Prevention of Constipation with Concurrent use of Opioid Analgesics         STOP taking these medications       dexamethasone (DECADRON) 1 MG tablet Comments:   Reason for Stopping:                       Discharge Instructions and Follow-Up:     Discharge diet: Regular   Discharge activity: You may advance activity as tolerated. No strenuous exercise or heay lifting greater than 10 lbs for 4 weeks or until seen and cleared in clinic.   Discharge follow-up: Follow-up with Dr. Rolf Neely MD in 2 weeks  Follow-up with ENT in 2 weeks  Referral sent for follow up with Endocrinology  to address steroid needs   Wound care: Nasal precautions until ENT follow-up      Please call if you have:  1. increased pain, redness, drainage, swelling at your incision  2. fevers > 101.5 F degrees  3. with any questions or concerns.  You may reach the Neurosurgery clinic at [phone] during regular work hours. ER at [phone].    and ask for the Neurosurgery Resident on call at [phone], during off hours or weekends.         Discharge Disposition:     Discharged to home        Ana Pat MS4    Fiona Cortez MD  Neurosurgery PGY2    Please contact neurosurgery resident on call with questions.    Dial * [phone], enter [phone] when prompted.

## 2022-06-06 ENCOUNTER — TELEPHONE (OUTPATIENT)
Dept: NEUROSURGERY | Facility: CLINIC | Age: 47
End: 2022-06-06
Payer: COMMERCIAL

## 2022-06-06 NOTE — TELEPHONE ENCOUNTER
Spoke with Yasmine, patient is eating and drinking and up and  About some.. States he is having night jerez and is in some discomfort.  Discharged 6/4/22  Endoscopic endonasal right transpterygoid approach to the middle fossa (extradural)  2.  Redo resection of middle fossa tumor (extradural    Precious states has been using Oxycodone 5mg one tablet every 4-6 hours and used all 12 tablets.  Explained Oxycodone can cause nightmares.  New pain regimine  Ibuprofen 600mg alternating with Tylenol 100mg every 6 hours as needed for discomfort.    Patient will try medication and will check with Dr Neely for any additional medication  Voices understanding.  Note sent to RT.

## 2022-06-06 NOTE — TELEPHONE ENCOUNTER
Writer routed to Neurosurgery Nurse Pool.   Attn: Alina Negron, RNCC for Dr. Neely     *Patient care giver requesting RX.     Alesia Scales LPN  Neurosurgery

## 2022-06-06 NOTE — PLAN OF CARE
Physical Therapy Discharge Summary    Reason for therapy discharge:    Discharged to home.    Progress towards therapy goal(s). See goals on Care Plan in [facility] electronic health record for goal details.  Goals not met.  Barriers to achieving goals:   discharge from facility.    Therapy recommendation(s):    No further therapy is recommended.

## 2022-06-06 NOTE — TELEPHONE ENCOUNTER
[facility] Call Center    Phone Message    May a detailed message be left on voicemail: yes     Reason for Call: Medication Refill Request      Name of medication being requested: oxyCODONE (ROXICODONE) 5 MG tablet  Provider who prescribed the medication: Caller not sure   [facility] [address] [facility] [address]  Date medication is needed:  Patient is out of pain medication. Per Gay, pt is still in a lot of pain after surgery she is wondering if provider can prescribe more pain medications for patient. Gay states that she is not sure what medication he can take for over the counter medications that is why she is calling also she reports that one of the medication prescribed to him is causing patient to have horrible nightmares and he will wake up every 4 hours.       Action Taken: Message routed to:  [facility]    Travel Screening: Not Applicable                                                                      .

## 2022-06-07 DIAGNOSIS — G89.18 POSTOPERATIVE PAIN: Primary | ICD-10-CM

## 2022-06-07 RX ORDER — HYDROCODONE BITARTRATE AND ACETAMINOPHEN 5; 325 MG/1; MG/1
1 TABLET ORAL EVERY 6 HOURS PRN
Qty: 40 TABLET | Refills: 0 | Status: SHIPPED | OUTPATIENT
Start: 2022-06-07

## 2022-06-07 NOTE — TELEPHONE ENCOUNTER
Spoke with Yasmine and patient, states using Tylenol 1000mg every 6- 8 hours for discomfort, would like to try Hydrocodone for HS.    Not using any Ibuprofen .    Explained I would get Dr Neely to order the Hydrocodone 5/325 at the [facility] pharmacy in chart.    Voices understanding, will stay  In touch

## 2022-06-07 NOTE — TELEPHONE ENCOUNTER
Call back to Vincenzo on home phone, leaving message can help with pain medication, NO IBUPROFEN for 7 days post op.  Please call Alina  [phone]

## 2022-06-07 NOTE — TELEPHONE ENCOUNTER
Per Dr Neely, HOLD Ibuprofen for 7 days post op.  Can use Hydrocodone for discomfort.  Please call Alina  [phone]

## 2022-06-08 ENCOUNTER — PATIENT OUTREACH (OUTPATIENT)
Dept: OTOLARYNGOLOGY | Facility: CLINIC | Age: 47
End: 2022-06-08
Payer: COMMERCIAL

## 2022-06-08 NOTE — PROGRESS NOTES
Responsible Attending Physician: Jo-Ann Siegel   Date of Discharge: 06/04/22    Discharge to:  Home     Current Status:  Pt is a 46 y/o male s/p stealth assisted Redo endoscopic, endonasal approach for resection of recurrent pituitary tumor on 06/02/22.  Reports pre-op symptoms improved. Operative pain is decreasing.  Ambulating without assistance.  Pain well controlled with current meds, ample supply. Denies redness, swelling, increased tenderness, or elevated temp.  Denies current bowel or bladder issues.  No visible sutures/staples in place.        Discharge instructions and medication use were reviewed.  RN triage/on call number given:  [phone] or after hours and w/e - [phone].  Patient verbalized understanding and agreement with current plan.     Follow up appointments: 06/23/22 at 1300    Madison Levin RN on 6/8/2022 at 1:50 PM

## 2022-06-09 LAB
PATH REPORT.COMMENTS IMP SPEC: NORMAL
PATH REPORT.FINAL DX SPEC: NORMAL
PATH REPORT.GROSS SPEC: NORMAL
PATH REPORT.MICROSCOPIC SPEC OTHER STN: NORMAL
PATH REPORT.RELEVANT HX SPEC: NORMAL
PHOTO IMAGE: NORMAL

## 2022-06-09 PROCEDURE — 88342 IMHCHEM/IMCYTCHM 1ST ANTB: CPT | Mod: 26 | Performed by: PATHOLOGY

## 2022-06-09 PROCEDURE — 88307 TISSUE EXAM BY PATHOLOGIST: CPT | Mod: 26 | Performed by: PATHOLOGY

## 2022-06-09 PROCEDURE — 88341 IMHCHEM/IMCYTCHM EA ADD ANTB: CPT | Mod: 26 | Performed by: PATHOLOGY

## 2022-06-15 ENCOUNTER — TELEPHONE (OUTPATIENT)
Dept: NEUROSURGERY | Facility: CLINIC | Age: 47
End: 2022-06-15
Payer: COMMERCIAL

## 2022-06-23 ENCOUNTER — OFFICE VISIT (OUTPATIENT)
Dept: NEUROSURGERY | Facility: CLINIC | Age: 47
End: 2022-06-23
Payer: COMMERCIAL

## 2022-06-23 ENCOUNTER — OFFICE VISIT (OUTPATIENT)
Dept: OTOLARYNGOLOGY | Facility: CLINIC | Age: 47
End: 2022-06-23
Payer: COMMERCIAL

## 2022-06-23 VITALS
OXYGEN SATURATION: 97 % | SYSTOLIC BLOOD PRESSURE: 121 MMHG | DIASTOLIC BLOOD PRESSURE: 89 MMHG | HEART RATE: 62 BPM | WEIGHT: 205.7 LBS | RESPIRATION RATE: 16 BRPM | BODY MASS INDEX: 30.38 KG/M2

## 2022-06-23 VITALS
HEART RATE: 73 BPM | SYSTOLIC BLOOD PRESSURE: 122 MMHG | WEIGHT: 206 LBS | TEMPERATURE: 98 F | HEIGHT: 69 IN | BODY MASS INDEX: 30.51 KG/M2 | DIASTOLIC BLOOD PRESSURE: 82 MMHG

## 2022-06-23 DIAGNOSIS — Z98.890 S/P SELECTIVE TRANSSPHENOIDAL PITUITARY ADENOMECTOMY: Primary | ICD-10-CM

## 2022-06-23 DIAGNOSIS — Z86.018 S/P SELECTIVE TRANSSPHENOIDAL PITUITARY ADENOMECTOMY: Primary | ICD-10-CM

## 2022-06-23 DIAGNOSIS — D35.2 PITUITARY MACROADENOMA (H): Primary | ICD-10-CM

## 2022-06-23 PROCEDURE — 99024 POSTOP FOLLOW-UP VISIT: CPT | Performed by: OTOLARYNGOLOGY

## 2022-06-23 PROCEDURE — 99024 POSTOP FOLLOW-UP VISIT: CPT | Performed by: NURSE PRACTITIONER

## 2022-06-23 RX ORDER — IBUPROFEN 200 MG
200 TABLET ORAL EVERY 4 HOURS PRN
COMMUNITY

## 2022-06-23 ASSESSMENT — PAIN SCALES - GENERAL
PAINLEVEL: NO PAIN (0)
PAINLEVEL: MILD PAIN (3)

## 2022-06-23 NOTE — TELEPHONE ENCOUNTER
FUTURE VISIT INFORMATION      FUTURE VISIT INFORMATION:    Date: 8/30/2022    Time: 1:45 PM    Location: [facility] [place]  REFERRAL INFORMATION:    Referring provider: Xiomara Issa NP    Referring providers clinic:  [facility] - Neurosurgery    Reason for visit/diagnosis: Possible levator sling in the right eye, optic atrophy following pituitary surgery    RECORDS REQUESTED FROM:       Clinic name Comments Records Status Imaging Status   [facility] ([facility]) 8/4/22, 6/23/22 - ENT OV with Dr. Green  6/23/22 - NEUROSURG OV with Xiomara Issa NP  5/3/22, 10/7/21 - NEUROSURG OV with Dr. Neely  4/7/21 - EYE OV with Dr. Coral Delgado    [facility] - Surgery 6/2/22 - OP Note for stealth assisted Redo endoscopic, endonasal approach for resection of recurrent pituitary tumor with Dr. Siegel and Dr. Neely  3/22/21 - OP Note for stealth assisted Right frontotemporal craniotomy and resection of tumor with Dr. Neely  6/29/18 - OP Note for Stealth Assisted Endoscopic Transnasal Resection Of Pituitary Tumor with Dr. Green and Dr[facility] 6[facility] - CT Head  6/3/22 - CTA Head/Neck  6/2/22 - CT Head  6/2/22 - MRI Brain  3/23/21 - MRI Brain  3/23/21 - CT Head  3/22/21 - MRI Pituitary  3/22/21 - CT Head  * additional in Epic/PACs [facility] PACs   . [facility] 10/21/20 - EYE OV Dr. Hernandez  4/24/18 - EYE OV with Dr. Meneses Scanned In    [facility] - Imaging 5/2/22 - MRI Pituitary  3/4/21 - MRI Pituitary  9/14/20 - MRI Brain/Pituitary  3/28/19 - MRI Brain/Pituitary Scanned In PACs

## 2022-06-23 NOTE — PATIENT INSTRUCTIONS
Do not do any bending, twisting, strenuous exercise, or heavy lifting (greater than 10 pounds) for 4-6 weeks. Be careful and ask for assistance when walking or going up and down stairs. Avoid any activities that could result in trauma to the surgical wound. Do not drive while using narcotics or other sedating medications, such as sleep aids, muscle relaxants, etc.      Follow-up with Dr. Siegel this afternoon as scheduled.     Please follow sinus precautions upon discharge until seen by ENT, precautions as follows:   1.  Do not blow nose  2.  Dab gently under the nose to collect drainage  3.  Do not pick nose or insert tissues/cloths inside of nose  4.  Sneeze with mouth open  5.  Do not strain, please take stool softeners as prescribed      Patient was to see Dr. Thorpe in Ophthalmology, will place referral     Follow-up with Dr. Adonis Neely in 3 months with repeat MRI brain (pituitary protocol).
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
## 2022-06-23 NOTE — LETTER
Date:June 24, 2022      Patient was self referred, no letter generated. Do not send.        [facility] Health Information

## 2022-06-23 NOTE — LETTER
6/23/2022       RE: Vincenzo Avery  [address]  [place] [address]     Dear Colleague,    Thank you for referring your patient, Vincenzo Avery, to the [facility] NEUROSURGERY CLINIC [place] at [facility]. Please see a copy of my visit note below.        Neurosurgery Clinic Note      DATE OF VISIT: 6/23/2022    HPI: Vincenzo Avery is a pleasant 47 year old male who is s/p endoscopic endonasal right transpterygoid approach to the middle fossa and redo resection of middle fossa turmor by  on 6/02/2022. Per chart review, the patient has undergone 2 prior resections for this tumor.  First was an endoscopic endonasal approach and the second was a transcranial approach by Dr. Neely.  Following the second operation, he developed a complete third nerve palsy. He has been followed in clinic due to a growing recurrent cavernous segment of tumor. This has continued to grow over interval scans.  He was not experiencing new neurological symptoms, however, given his young age and continued growth of the sizable residual tumor, he was indicated for the aforementioned procedure.  The surgery went well and post-operatively the patient did not exhibit signs of CSF drainage, diabetes insipidus or epistaxis.  Patient was discharged home on POD#2.     Since discharge the patient states overall he is doing well.  He does endorse a frontal headache along with photophobia, especially on the right.  He states he has noted similar headaches following his previous surgeries which did eventually improve.  Headaches are not positional but more related to how much light he is experiencing. Denies fever or neck pain.  He continues to note diplopia but this has not changed.  He states vision has not changed.  Noted intermittent nasal drainage following surgery, however no drainage has been noted for the past 4 days.   Denies polyuria or polydipsia or other signs of DI.   No  salty or metallic taste in mouth.   Patient only utilizing tylenol twice daily for pain.      Patient  denies any fevers, chills, or other signs of infection.        Review of Systems   Negative except in HPI    Past Medical History:   Diagnosis Date     History of kidney stones      Migraines      NIC (obstructive sleep apnea)     Does not use CPAP     Pituitary mass (H)        Past Surgical History:   Procedure Laterality Date     DENTAL SURGERY      [place] teeth extraction     HAND SURGERY Right     Repair of fracture     INSERT DRAIN LUMBAR N/A 3/22/2021    Procedure: INSERTION, DRAIN, SPINE, LUMBAR;  Surgeon: Rolf Neely MD;  Location: [facility] OR     OPTICAL TRACKING SYSTEM ENDOSCOPIC RESECTION TUMOR CRANIAL N/A 6/29/2018    Procedure: OPTICAL TRACKING SYSTEM ENDOSCOPIC RESECTION TUMOR CRANIAL;  Stealth Assisted Endoscopic Transnasal Resection Of Pituitary Tumor;  Surgeon: Jo-Ann Green MD;  Location: [facility] OR     OPTICAL TRACKING SYSTEM ENDOSCOPIC RESECTION TUMOR CRANIAL Right 3/22/2021    Procedure: stealth assisted Right frontotemporal craniotomy and resection of tumor;  Surgeon: Rolf Neely MD;  Location: [facility] OR     OPTICAL TRACKING SYSTEM ENDOSCOPIC RESECTION TUMOR CRANIAL N/A 6/2/2022    Procedure: stealth assisted Redo endoscopic, endonasal approach for resection of recurrent pituitary tumor.  ;  Surgeon: Rolf Neely MD;  Location: [facility] OR     OPTICAL TRACKING SYSTEM ENDOSCOPIC RESECTION TUMOR CRANIAL  6/2/2022    Procedure: ;  Surgeon: Rolf Neely MD;  Location: [facility] OR     ORCHIECTOMY SCROTAL Right        Social History     Socioeconomic History     Marital status: Single   Tobacco Use     Smoking status: Never Smoker     Smokeless tobacco: Never Used   Substance and Sexual Activity     Alcohol use: Yes     Comment: Rarely     Drug use: Yes     Types: Marijuana     Comment: Daily marijuana use for pain/headaches       family  history includes Back Pain in his father; Breast Cancer in his maternal grandmother; Cerebrovascular Disease in his maternal grandmother and mother; Diabetes in his mother; Hypertension in his brother, maternal grandfather, and mother; Medical History Unknown in his paternal grandfather and paternal grandmother; No Known Problems in his sister; Obesity in his brother; Other - See Comments in his sister.              Physical Exam   There were no vitals taken for this visit.  Constitutional: Oriented to person, place, and time. Appears well-developed and well-nourished. Cooperative. No distress.   HENT: Head normocephalic and atraumatic.     Incision:   No nasal drainage noted.  Neurological: alert and oriented to person, place, and time.    - Left pupil reactive to light. Right pupil 4mm and fixed.  - Left EOMI. Right eye with small movements, though mostly fixed midline.      STRENGTH LEFT RIGHT   Deltoid 5 5   Bicep 5 5   Wrist Extensor 5 5   Tricep 5 5   Finger flexion 5 5   Finger abduction 5 5    5 5       Hip Flexion     5     5   Knee Extension 5 5   Ankle Dorsiflexion 5 5   Extensor Hallucis Longus 5 5   Plantar Flexion 5 5   Foot eversion 5 5   Foot inversion 5 5       Skin: Skin is warm, dry and intact.   Psychiatric: Normal mood and affect. Speech is normal and behavior is normal.        ASSESSMENT:  Vincenzo Avery is a 47 year old male s/p endoscopic endonasal right transpterygoid approach to the middle fossa and redo resection of middle fossa turmor by  on 6/02/2022.  PLAN:  Do not do any bending, twisting, strenuous exercise, or heavy lifting (greater than 10 pounds) for 4-6 weeks. Be careful and ask for assistance when walking or going up and down stairs. Avoid any activities that could result in trauma to the surgical wound. Do not drive while using narcotics or other sedating medications, such as sleep aids, muscle relaxants, etc.    Discussed signs such as frequent nasal drainage,  worsening headaches, neck pain, fever or chills that could indicate CSF leak and urgent evaluation with patient and significant other.       Follow-up with Dr. Siegel this afternoon as scheduled.     Please follow sinus precautions upon discharge until seen by ENT, precautions as follows:   1.  Do not blow nose  2.  Dab gently under the nose to collect drainage  3.  Do not pick nose or insert tissues/cloths inside of nose  4.  Sneeze with mouth open  5.  Do not strain, please take stool softeners as prescribed      Patient was to see Dr. Thorpe in Ophthalmology, will place referral     Follow-up with Dr. Adonis Neely in 3 months with repeat MRI brain (pituitary protocol).          I spent 25 minutes in patient care with greater than 50% spent in counseling and/or coordination of care.     I performed independent visualization of radiographic imaging and entered my own interpretation, reviewed and/or ordered tests in radiology        YULIYA Davalos, CNP  Department of Neurosurgery  Pager: [phone]

## 2022-06-23 NOTE — PROGRESS NOTES
Neurosurgery Clinic Note      DATE OF VISIT: 6/23/2022    HPI: Vincenzo Avery is a pleasant 47 year old male who is s/p endoscopic endonasal right transpterygoid approach to the middle fossa and redo resection of middle fossa turmor by  on 6/02/2022. Per chart review, the patient has undergone 2 prior resections for this tumor.  First was an endoscopic endonasal approach and the second was a transcranial approach by Dr. Neely.  Following the second operation, he developed a complete third nerve palsy. He has been followed in clinic due to a growing recurrent cavernous segment of tumor. This has continued to grow over interval scans.  He was not experiencing new neurological symptoms, however, given his young age and continued growth of the sizable residual tumor, he was indicated for the aforementioned procedure.  The surgery went well and post-operatively the patient did not exhibit signs of CSF drainage, diabetes insipidus or epistaxis.  Patient was discharged home on POD#2.     Since discharge the patient states overall he is doing well.  He does endorse a frontal headache along with photophobia, especially on the right.  He states he has noted similar headaches following his previous surgeries which did eventually improve.  Headaches are not positional but more related to how much light he is experiencing. Denies fever or neck pain.  He continues to note diplopia but this has not changed.  He states vision has not changed.  Noted intermittent nasal drainage following surgery, however no drainage has been noted for the past 4 days.   Denies polyuria or polydipsia or other signs of DI.   No salty or metallic taste in mouth.   Patient only utilizing tylenol twice daily for pain.      Patient  denies any fevers, chills, or other signs of infection.        Review of Systems   Negative except in HPI    Past Medical History:   Diagnosis Date     History of kidney stones      Migraines      NIC  (obstructive sleep apnea)     Does not use CPAP     Pituitary mass (H)        Past Surgical History:   Procedure Laterality Date     DENTAL SURGERY      [place] teeth extraction     HAND SURGERY Right     Repair of fracture     INSERT DRAIN LUMBAR N/A 3/22/2021    Procedure: INSERTION, DRAIN, SPINE, LUMBAR;  Surgeon: Rolf Neely MD;  Location: [facility] OR     OPTICAL TRACKING SYSTEM ENDOSCOPIC RESECTION TUMOR CRANIAL N/A 6/29/2018    Procedure: OPTICAL TRACKING SYSTEM ENDOSCOPIC RESECTION TUMOR CRANIAL;  Stealth Assisted Endoscopic Transnasal Resection Of Pituitary Tumor;  Surgeon: Jo-Ann Green MD;  Location: [facility] OR     OPTICAL TRACKING SYSTEM ENDOSCOPIC RESECTION TUMOR CRANIAL Right 3/22/2021    Procedure: stealth assisted Right frontotemporal craniotomy and resection of tumor;  Surgeon: Rolf Neely MD;  Location: [facility] OR     OPTICAL TRACKING SYSTEM ENDOSCOPIC RESECTION TUMOR CRANIAL N/A 6/2/2022    Procedure: stealth assisted Redo endoscopic, endonasal approach for resection of recurrent pituitary tumor.  ;  Surgeon: Rolf Neely MD;  Location: [facility] OR     OPTICAL TRACKING SYSTEM ENDOSCOPIC RESECTION TUMOR CRANIAL  6/2/2022    Procedure: ;  Surgeon: Rolf Neely MD;  Location: [facility] OR     ORCHIECTOMY SCROTAL Right        Social History     Socioeconomic History     Marital status: Single   Tobacco Use     Smoking status: Never Smoker     Smokeless tobacco: Never Used   Substance and Sexual Activity     Alcohol use: Yes     Comment: Rarely     Drug use: Yes     Types: Marijuana     Comment: Daily marijuana use for pain/headaches       family history includes Back Pain in his father; Breast Cancer in his maternal grandmother; Cerebrovascular Disease in his maternal grandmother and mother; Diabetes in his mother; Hypertension in his brother, maternal grandfather, and mother; Medical History Unknown in his paternal grandfather and paternal  grandmother; No Known Problems in his sister; Obesity in his brother; Other - See Comments in his sister.              Physical Exam   There were no vitals taken for this visit.  Constitutional: Oriented to person, place, and time. Appears well-developed and well-nourished. Cooperative. No distress.   HENT: Head normocephalic and atraumatic.     Incision:   No nasal drainage noted.  Neurological: alert and oriented to person, place, and time.    - Left pupil reactive to light. Right pupil 4mm and fixed.  - Left EOMI. Right eye with small movements, though mostly fixed midline.      STRENGTH LEFT RIGHT   Deltoid 5 5   Bicep 5 5   Wrist Extensor 5 5   Tricep 5 5   Finger flexion 5 5   Finger abduction 5 5    5 5       Hip Flexion     5     5   Knee Extension 5 5   Ankle Dorsiflexion 5 5   Extensor Hallucis Longus 5 5   Plantar Flexion 5 5   Foot eversion 5 5   Foot inversion 5 5       Skin: Skin is warm, dry and intact.   Psychiatric: Normal mood and affect. Speech is normal and behavior is normal.        ASSESSMENT:  Vincenzo Avery is a 47 year old male s/p endoscopic endonasal right transpterygoid approach to the middle fossa and redo resection of middle fossa turmor by  on 6/02/2022.  PLAN:  Do not do any bending, twisting, strenuous exercise, or heavy lifting (greater than 10 pounds) for 4-6 weeks. Be careful and ask for assistance when walking or going up and down stairs. Avoid any activities that could result in trauma to the surgical wound. Do not drive while using narcotics or other sedating medications, such as sleep aids, muscle relaxants, etc.    Discussed signs such as frequent nasal drainage, worsening headaches, neck pain, fever or chills that could indicate CSF leak and urgent evaluation with patient and significant other.       Follow-up with Dr. Siegel this afternoon as scheduled.     Please follow sinus precautions upon discharge until seen by ENT, precautions as follows:   1.  Do not  blow nose  2.  Dab gently under the nose to collect drainage  3.  Do not pick nose or insert tissues/cloths inside of nose  4.  Sneeze with mouth open  5.  Do not strain, please take stool softeners as prescribed      Patient was to see Dr. Thorpe in Ophthalmology, will place referral     Follow-up with Dr. Adonis Neely in 3 months with repeat MRI brain (pituitary protocol).          I spent 25 minutes in patient care with greater than 50% spent in counseling and/or coordination of care.     I performed independent visualization of radiographic imaging and entered my own interpretation, reviewed and/or ordered tests in radiology        YULIYA Davalos, CNP  Department of Neurosurgery  Pager: [phone]
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
## 2022-06-23 NOTE — NURSING NOTE
Chief Complaint   Patient presents with     RECHECK     [facility] POST-OP - 2 WK POST OP     Joe Avina

## 2022-07-16 ENCOUNTER — HEALTH MAINTENANCE LETTER (OUTPATIENT)
Age: 47
End: 2022-07-16

## 2022-08-03 ENCOUNTER — TELEPHONE (OUTPATIENT)
Dept: NEUROSURGERY | Facility: CLINIC | Age: 47
End: 2022-08-03

## 2022-08-04 ENCOUNTER — OFFICE VISIT (OUTPATIENT)
Dept: OTOLARYNGOLOGY | Facility: CLINIC | Age: 47
End: 2022-08-04
Payer: COMMERCIAL

## 2022-08-04 VITALS
HEIGHT: 69 IN | BODY MASS INDEX: 30.88 KG/M2 | HEART RATE: 81 BPM | TEMPERATURE: 98.2 F | SYSTOLIC BLOOD PRESSURE: 129 MMHG | DIASTOLIC BLOOD PRESSURE: 91 MMHG | OXYGEN SATURATION: 97 % | WEIGHT: 208.5 LBS

## 2022-08-04 DIAGNOSIS — E23.6 PITUITARY MASS (H): Primary | ICD-10-CM

## 2022-08-04 PROCEDURE — 31231 NASAL ENDOSCOPY DX: CPT | Performed by: OTOLARYNGOLOGY

## 2022-08-04 PROCEDURE — 99214 OFFICE O/P EST MOD 30 MIN: CPT | Mod: 25 | Performed by: OTOLARYNGOLOGY

## 2022-08-04 ASSESSMENT — PAIN SCALES - GENERAL: PAINLEVEL: NO PAIN (0)

## 2022-08-04 NOTE — NURSING NOTE
"Blood pressure (!) 129/91, pulse 81, temperature 98.2  F (36.8  C), temperature source Temporal, height 1.753 m (5' 9\"), weight 94.6 kg (208 lb 8 oz), SpO2 97 %.    Beth Cueva LPN    "

## 2022-08-04 NOTE — PROGRESS NOTES
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 Right naso-septal flap placed back into he septum. Right frontotemporal craniotomy with zygomatic osteotomy to resect pituitary tumor on 03/26/2021  RE-DO EEA DEBULKING OF RECURRENT PITUITARY MACRO ADENOMA    History of Present Illness: Patient here for postoperative follow-up he is doing really well he denies any nasal obstruction or congestion.  He has an appointment to see Dr. Mac coming up as well as ophthalmology.  He denies any symptoms of CSF leak.    MEDICATIONS:     Current Outpatient Medications   Medication Sig Dispense Refill     HYDROcodone-acetaminophen (NORCO) 5-325 MG tablet Take 1 tablet by mouth every 6 hours as needed for moderate to severe pain 40 tablet 0     hydrocortisone (CORTEF) 10 MG tablet Taper dose: 6/4: 40mg am, 20 mg pm 6/5: 20 mg am, 10mg pm 6/6: 10mg am, 5mg pm - this dose to be continued until follow up with endocrinology 30 tablet 1     hydrOXYzine (ATARAX) 10 MG tablet Take 1 tablet (10 mg) by mouth 3 times daily as needed for anxiety 30 tablet 1     ibuprofen (ADVIL/MOTRIN) 200 MG tablet Take 200 mg by mouth every 4 hours as needed for mild pain       levETIRAcetam (KEPPRA) 750 MG tablet Take 1 tablet (750 mg) by mouth 2 times daily 8 tablet 0     ondansetron (ZOFRAN-ODT) 4 MG ODT tab Take 1-2 tablets (4-8 mg) by mouth every 6 hours as needed for nausea or vomiting 30 tablet 1     senna-docusate (SENOKOT-S/PERICOLACE) 8.6-50 MG tablet Take 2 tablets by mouth 2 times daily as needed for constipation 60 tablet 1       ALLERGIES:    Allergies   Allergen Reactions     Morphine      Patient has significant nausea/vomiting with IV morphine.         PAST MEDICAL HISTORY:   Past Medical History:   Diagnosis Date     History of kidney stones      Migraines      NIC (obstructive sleep apnea)     Does not use CPAP     Pituitary mass (H)         FAMILY HISTORY:    Family History   Problem Relation  Age of Onset     Cerebrovascular Disease Mother      Diabetes Mother      Hypertension Mother      Back Pain Father         Degenerative joint/disc disease     Other - See Comments Sister         Injuries sustained in an MVC     No Known Problems Sister      Hypertension Brother      Obesity Brother      Cerebrovascular Disease Maternal Grandmother      Breast Cancer Maternal Grandmother      Hypertension Maternal Grandfather      Medical History Unknown Paternal Grandmother      Medical History Unknown Paternal Grandfather      Anesthesia Reaction No family hx of      Deep Vein Thrombosis (DVT) No family hx of        REVIEW OF SYSTEMS:  12 point ROS was negative other than the symptoms noted above in the HPI.    Nasal endoscopy.  Consent for nasal endoscopy was obtained.  We confirmed correctness of procedure and identity of patient.  Nasal endoscopy was indicated due to recent pituitary surgery.  The nose was topically decongested and anesthetized.  The nasal endoscope was passed under endoscopic vision.  Inferior turbinates were normal.  The remaining septum is in the midline.  The surgical site posterior septectomy and the sphenoid sinus is nice and healed.  No evidence of purulence no evidence of CSF leak.    IMPRESSION AND PLAN: 47-year-old gentleman status post redo transnasal endoscopic approach to resect pituitary macroadenoma.  Sinonasal examination today reveals very well-healed sinonasal passages.  No evidence of purulence or CSF leak.  Plan follow-up as needed.      Jo-Ann Green MD, M.D.  Otolaryngology- Head & Neck Surgery  [phone]

## 2022-08-04 NOTE — LETTER
Date:August 5, 2022      Patient was self referred, no letter generated. Do not send.        [facility] Health Information

## 2022-08-04 NOTE — LETTER
8/4/2022       RE: Vincenzo Avery  [address]  [facility] [address]     Dear Colleague,    Thank you for referring your patient, Vincenzo Avery, to the [facility] [place] at [facility]. Please see a copy of my visit note below.    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 Right naso-septal flap placed back into he septum. Right frontotemporal craniotomy with zygomatic osteotomy to resect pituitary tumor on 03/26/2021  RE-DO EEA DEBULKING OF RECURRENT PITUITARY MACRO ADENOMA    History of Present Illness: Patient here for postoperative follow-up he is doing really well he denies any nasal obstruction or congestion.  He has an appointment to see Dr. Mac coming up as well as ophthalmology.  He denies any symptoms of CSF leak.    MEDICATIONS:     Current Outpatient Medications   Medication Sig Dispense Refill     HYDROcodone-acetaminophen (NORCO) 5-325 MG tablet Take 1 tablet by mouth every 6 hours as needed for moderate to severe pain 40 tablet 0     hydrocortisone (CORTEF) 10 MG tablet Taper dose: 6/4: 40mg am, 20 mg pm 6/5: 20 mg am, 10mg pm 6/6: 10mg am, 5mg pm - this dose to be continued until follow up with endocrinology 30 tablet 1     hydrOXYzine (ATARAX) 10 MG tablet Take 1 tablet (10 mg) by mouth 3 times daily as needed for anxiety 30 tablet 1     ibuprofen (ADVIL/MOTRIN) 200 MG tablet Take 200 mg by mouth every 4 hours as needed for mild pain       levETIRAcetam (KEPPRA) 750 MG tablet Take 1 tablet (750 mg) by mouth 2 times daily 8 tablet 0     ondansetron (ZOFRAN-ODT) 4 MG ODT tab Take 1-2 tablets (4-8 mg) by mouth every 6 hours as needed for nausea or vomiting 30 tablet 1     senna-docusate (SENOKOT-S/PERICOLACE) 8.6-50 MG tablet Take 2 tablets by mouth 2 times daily as needed for constipation 60 tablet 1       ALLERGIES:    Allergies   Allergen Reactions      Morphine      Patient has significant nausea/vomiting with IV morphine.         PAST MEDICAL HISTORY:   Past Medical History:   Diagnosis Date     History of kidney stones      Migraines      NIC (obstructive sleep apnea)     Does not use CPAP     Pituitary mass (H)         FAMILY HISTORY:    Family History   Problem Relation Age of Onset     Cerebrovascular Disease Mother      Diabetes Mother      Hypertension Mother      Back Pain Father         Degenerative joint/disc disease     Other - See Comments Sister         Injuries sustained in an MVC     No Known Problems Sister      Hypertension Brother      Obesity Brother      Cerebrovascular Disease Maternal Grandmother      Breast Cancer Maternal Grandmother      Hypertension Maternal Grandfather      Medical History Unknown Paternal Grandmother      Medical History Unknown Paternal Grandfather      Anesthesia Reaction No family hx of      Deep Vein Thrombosis (DVT) No family hx of        REVIEW OF SYSTEMS:  12 point ROS was negative other than the symptoms noted above in the HPI.    Nasal endoscopy.  Consent for nasal endoscopy was obtained.  We confirmed correctness of procedure and identity of patient.  Nasal endoscopy was indicated due to recent pituitary surgery.  The nose was topically decongested and anesthetized.  The nasal endoscope was passed under endoscopic vision.  Inferior turbinates were normal.  The remaining septum is in the midline.  The surgical site posterior septectomy and the sphenoid sinus is nice and healed.  No evidence of purulence no evidence of CSF leak.    IMPRESSION AND PLAN: 47-year-old gentleman status post redo transnasal endoscopic approach to resect pituitary macroadenoma.  Sinonasal examination today reveals very well-healed sinonasal passages.  No evidence of purulence or CSF leak.  Plan follow-up as needed.      Jo-Ann Green MD, M.D.  Otolaryngology- Head & Neck Surgery  [phone]          Again, thank you for  allowing me to participate in the care of your patient.      Sincerely,    Jo-Ann Green MD

## 2022-08-05 ENCOUNTER — TELEPHONE (OUTPATIENT)
Dept: NEUROSURGERY | Facility: CLINIC | Age: 47
End: 2022-08-05

## 2022-08-30 ENCOUNTER — PRE VISIT (OUTPATIENT)
Dept: OPHTHALMOLOGY | Facility: CLINIC | Age: 47
End: 2022-08-30

## 2022-08-30 ENCOUNTER — OFFICE VISIT (OUTPATIENT)
Dept: OPHTHALMOLOGY | Facility: CLINIC | Age: 47
End: 2022-08-30
Attending: NURSE PRACTITIONER
Payer: COMMERCIAL

## 2022-08-30 DIAGNOSIS — Z86.018 S/P SELECTIVE TRANSSPHENOIDAL PITUITARY ADENOMECTOMY: ICD-10-CM

## 2022-08-30 DIAGNOSIS — Z98.890 S/P SELECTIVE TRANSSPHENOIDAL PITUITARY ADENOMECTOMY: ICD-10-CM

## 2022-08-30 DIAGNOSIS — H02.431: ICD-10-CM

## 2022-08-30 DIAGNOSIS — H49.01 RIGHT OCULOMOTOR NERVE PALSY: Primary | ICD-10-CM

## 2022-08-30 PROCEDURE — 92083 EXTENDED VISUAL FIELD XM: CPT | Mod: GC | Performed by: OPHTHALMOLOGY

## 2022-08-30 PROCEDURE — 92285 EXTERNAL OCULAR PHOTOGRAPHY: CPT | Mod: GC | Performed by: OPHTHALMOLOGY

## 2022-08-30 PROCEDURE — 99214 OFFICE O/P EST MOD 30 MIN: CPT | Mod: GC | Performed by: OPHTHALMOLOGY

## 2022-08-30 ASSESSMENT — MARGIN REFLEX DISTANCE
OD_MRD1: 1
OS_MRD1: 4
OS_MRD2: 5
OD_MRD2: 5

## 2022-08-30 ASSESSMENT — VISUAL ACUITY
OD_SC+: -2
OD_SC: 20/60
OS_SC: 20/40
OS_SC+: -1
METHOD: SNELLEN - LINEAR

## 2022-08-30 ASSESSMENT — TONOMETRY
IOP_METHOD: ICARE
OD_IOP_MMHG: 09
OS_IOP_MMHG: 11

## 2022-08-30 ASSESSMENT — CONF VISUAL FIELD
OS_SUPERIOR_TEMPORAL_RESTRICTION: 2
OS_INFERIOR_NASAL_RESTRICTION: 2
OS_SUPERIOR_NASAL_RESTRICTION: 2
OD_NORMAL: 1
OS_INFERIOR_TEMPORAL_RESTRICTION: 2

## 2022-08-30 ASSESSMENT — EXTERNAL EXAM - LEFT EYE: OS_EXAM: NORMAL

## 2022-08-30 ASSESSMENT — SLIT LAMP EXAM - LIDS
COMMENTS: PTOSIS
COMMENTS: NORMAL

## 2022-08-30 ASSESSMENT — LAGOPHTHALMOS
OD_LAGOPHTHALMOS: 0
OS_LAGOPHTHALMOS: 0

## 2022-08-30 ASSESSMENT — LEVATOR FUNCTION
OD_LEVATOR: 0
OS_LEVATOR: 14

## 2022-08-30 ASSESSMENT — EXTERNAL EXAM - RIGHT EYE: OD_EXAM: COMPLETE PTOSIS

## 2022-08-30 NOTE — PROGRESS NOTES
"Oculoplastic Clinic New Patient    Patient: Vincenzo Avery MRN# [id number]   YOB: 1975 Age: 47 year old   Date of Visit: Aug 30, 2022    CC: Droopy eyelids obstructing vision.              HPI:     Chief Complaint(s) and History of Present Illness(es)     Droopy Right Upper Lid     Laterality: right upper lid              Comments     Pt notes that he wants to get his eye straight, but notes that he is able   to open the RUL and feels that he just needs \"2 staples\" to have the lid   stay open. He notes that this is his good eye and would like it open    Christen Stake COT August 30, 2022 1:36 PM                  Vincenzo Avery is a 47 year old male with RUL ptosis after pituitary macroadenoma excision. His CN3 was transected over a year ago.    The droopy eyelid is interfering with activities of daily living including driving, and reading. The patient denies double vision, variability of the eyelid position, or dry eye symptoms.     EXAM:     MRD1: 1/4  Significant synkinesis.     VISUAL FIELD:  Right eye untaped:10 degrees Right eye taped:40 degrees    Assessment & Plan     Vnicenzo Avery is a 47 year old male with the following diagnoses:   1. Right oculomotor nerve palsy    2. S/P selective transsphenoidal pituitary adenomectomy    3. Ptosis, paralytic, right       Pituitary macroadenoma with bilateral optic atrophy. Has had multiple excisional surgeries and experienced a CN3 palsy after surgery. Most recent s/p endonasal transpterygoid adenoma excision 6/2/22 and noted slight improvement of vertical movement with his right eye.    Was seen in clinic with Dr. Moncada 4/7/21 and at that time Dr. Moncada recommended stravismus surgery to help make the right eye ortho.    Plan:  Note to Dr. Moncada, will likely need new strab measurements.    Can then arrange combined strabismus surgery and right frontalis sling.    We did discuss it is a very real risk he would have worsening diplopia with " his eyelid elevated, but it is his better seeing eye, and he feels he would be much better off with the right upper eyelid lifted so he can see from that eye, and ignoring or wearing a patch over the left eye if diplopia becomes an issue.      ANTICOAGULATION:  None    PHOTOS DEMONSTRATE:  Blepharoptosis    Thank you for entrusting us with your care  Gia Herrera MD, PGY2  Ophthalmology Resident  [facility]      Attending Physician Attestation: Complete documentation of historical and exam elements from today's encounter can be found in the full encounter summary report (not reduplicated in this progress note). I personally obtained the chief complaint(s) and history of present illness. I confirmed and edited as necessary the review of systems, past medical/surgical history, family history, social history, and examination findings as documented by others; and I examined the patient myself. I personally reviewed the relevant tests, images, and reports as documented above. I formulated and edited as necessary the assessment and plan and discussed the findings and management plan with the patient. Felice Thorpe MD      Today with Vincenzo Avery and his girlfriend Gay, I reviewed the indications, risks, benefits, and alternatives of the proposed surgical procedure including, but not limited to, failure obtain the desired result  and need for additional surgery, bleeding, infection, loss of vision, loss of the eye, and the remote possibility of permanent damage to any organ system or death with the use of anesthesia.  I provided multiple opportunities for the questions, answered all questions to the best of my ability, and confirmed that my answers and my discussion were understood.

## 2022-09-07 ENCOUNTER — TELEPHONE (OUTPATIENT)
Dept: OPHTHALMOLOGY | Facility: CLINIC | Age: 47
End: 2022-09-07

## 2022-09-18 ENCOUNTER — HEALTH MAINTENANCE LETTER (OUTPATIENT)
Age: 47
End: 2022-09-18

## 2022-09-27 ENCOUNTER — ANCILLARY PROCEDURE (OUTPATIENT)
Dept: MRI IMAGING | Facility: CLINIC | Age: 47
End: 2022-09-27
Attending: NURSE PRACTITIONER
Payer: COMMERCIAL

## 2022-09-27 ENCOUNTER — OFFICE VISIT (OUTPATIENT)
Dept: NEUROSURGERY | Facility: CLINIC | Age: 47
End: 2022-09-27
Payer: COMMERCIAL

## 2022-09-27 VITALS
OXYGEN SATURATION: 98 % | WEIGHT: 208 LBS | DIASTOLIC BLOOD PRESSURE: 90 MMHG | SYSTOLIC BLOOD PRESSURE: 146 MMHG | BODY MASS INDEX: 30.72 KG/M2 | RESPIRATION RATE: 16 BRPM | HEART RATE: 78 BPM

## 2022-09-27 DIAGNOSIS — Z98.890 S/P SELECTIVE TRANSSPHENOIDAL PITUITARY ADENOMECTOMY: ICD-10-CM

## 2022-09-27 DIAGNOSIS — Z86.018 S/P SELECTIVE TRANSSPHENOIDAL PITUITARY ADENOMECTOMY: ICD-10-CM

## 2022-09-27 DIAGNOSIS — D35.2 PITUITARY MACROADENOMA (H): Primary | ICD-10-CM

## 2022-09-27 PROCEDURE — 99207 PR NO DOCUMENTATION ON VISIT: CPT | Performed by: NEUROLOGICAL SURGERY

## 2022-09-27 RX ORDER — GADOBUTROL 604.72 MG/ML
0.1 INJECTION INTRAVENOUS ONCE
Status: COMPLETED | OUTPATIENT
Start: 2022-09-27 | End: 2022-09-27

## 2022-09-27 RX ADMIN — GADOBUTROL 9.4 ML: 604.72 INJECTION INTRAVENOUS at 12:23

## 2022-09-27 ASSESSMENT — PAIN SCALES - GENERAL: PAINLEVEL: MILD PAIN (2)

## 2022-09-27 NOTE — LETTER
9/27/2022      RE: Vincenzo PAREKH [address]  [place] [address]       See dictated note. Visit 30 minutes.  MD Rolf Gilmore MD

## 2022-09-27 NOTE — LETTER
9/27/2022       RE: Vincenzo Avery  [address] [place] [address] [address]  [place] [address]         Dear Colleague,    Thank you for referring your patient, Vincenzo Avery, to the [facility]. Please see a copy of my visit note below.    No notes on file        Again, thank you for allowing me to participate in the care of your patient.      Sincerely,    Rolf Neely MD

## 2022-09-27 NOTE — NURSING NOTE
Chief Complaint   Patient presents with     RECHECK     [facility] RETURN - 3 mo. Followup post op     Joe Avina

## 2022-12-06 NOTE — OR NURSING
Body Location Override (Optional): Right medial buttock Pt arrived to PACU very restless and not following commands. Pt would moan and move all extremities but not when commanded to do so. Dr. Tonny Perry, with neurosurgery, at bedside and states that patient is expected to have a fixed and dilated RIGHT pupil that does not react. Pt's lumbar drain is to remain clamped. No labs or CT needed tonight.

## 2022-12-19 ENCOUNTER — TRANSCRIBE ORDERS (OUTPATIENT)
Dept: OTHER | Age: 47
End: 2022-12-19

## 2022-12-19 DIAGNOSIS — D35.2 PITUITARY ADENOMA (H): Primary | ICD-10-CM

## 2022-12-19 DIAGNOSIS — H49.00 THIRD NERVE PALSY: ICD-10-CM

## 2022-12-20 ENCOUNTER — DOCUMENTATION ONLY (OUTPATIENT)
Dept: NEUROSURGERY | Facility: CLINIC | Age: 47
End: 2022-12-20

## 2022-12-20 NOTE — PROGRESS NOTES
PAPERWORK DOCUMENTATION    How Paperwork is received:  Right Fax    Type of Paperwork: Other     Who is the paperwork for:  Patient    Received Date December 15, 2022    Who Received the paperwork:  Gloria    Form to be Completed by:  Gloria    Anticipated Completion Date: December 22nd-26th, 2022    Date Completed Form Faxed to Requested Entity: Writer faxed form to [place] [employer] @ [phone] on December 20th, 2022. A copy was also scanned into the patient's EMR.     CALLIE Higuera   Neurosurgery nurse navigator.

## 2022-12-22 ENCOUNTER — TELEPHONE (OUTPATIENT)
Dept: OPHTHALMOLOGY | Facility: CLINIC | Age: 47
End: 2022-12-22

## 2022-12-22 ENCOUNTER — DOCUMENTATION ONLY (OUTPATIENT)
Dept: NEUROSURGERY | Facility: CLINIC | Age: 47
End: 2022-12-22

## 2022-12-22 NOTE — TELEPHONE ENCOUNTER
Spoke to pt to schedule appointment with Dr. Moncada. Confirmed date/time/location. Appts last between 2-4 hours. Discussed dilation.     Leigh Perry on 12/22/2022 at 11:05 AM

## 2022-12-22 NOTE — PROGRESS NOTES
Tadeo Youngblood MD Tummala, Ramachandra Prasad, MD  Cc: Esequiel Peguero MD; Alina Negron RN; Albert Moncada MD; Angela Rodríguez RN; Kary Bishop MD; 1 other  Thanks Adonis,     I agree that we will need to pursue conventional fractionation given the size and proximity to the optic structures.     Rad onc team, please get Mr. Avery in for a consult with me at his convenience.     Thanks,     Sam           Previous Messages     ----- Message -----   From: Rolf Neely MD   Sent: 12/19/2022  11:27 AM CST   To: Alina Negron RN, Esequiel Peguero MD, *   Subject: Radiation for residual pituitary adenoma         Dear All,     This patient has undergone maximum surgery and has a large residual pituitary adenoma. He also has third nerve palsy from a previous surgery. I think radiation is necessary, and I'm guessing it's too big for radiosurgery. The patient has hopes of pursuing corrective surgery with Dr. Moncada at some point. Hope you'll meet the patient and review options.     Call me with questions. Can fill you in on details. [phone] is my cell     Regards     Adonis

## 2023-01-01 NOTE — PROGRESS NOTES
4/30/2018       Dear Dr. Meneses:    This 43 year old left handed man who presents for pituitary mass lesion found after presenting with L peripheral field cut which started 2 years ago. Within the last 2-3 months, the patient noticed that his Right peripheral fields were increasingly diminished. Headaches started 4 months prior and are worsening. He does note tinnitus, imbalance, positional headache (leaning forward and with Valsalva), and marked fatigue.     Denies galactorrhea or breast enlargement. No changes in shoe or glove size. Denies issues with erectile dysfunction or libido. Has not been tested for diabetes. Denies weight loss. Reports weight gain of 15 lbs. since December 2017. Denies polydipsia. Does report nocturia (3-4 episodes). Denies colorless urine. Reports chronically poor gustation secondary to tongue injury from licking battery as a child. Reports poor olfaction of unclear onset.     Patient was seen by Dr. Meneses, in [place], on initial presentation who had findings of bitemporal hemianopsia. Visual acuity per Dr. Meneses (OD 20/50; OS 20/100). OCT showed minimum rim width within normal limits both eyes. RNFLT shows thinning temporal both eyes, more so on the left.     Past Medical History:    15 years prior, patient had infection from wisdom tooth extraction complicated by sepsis requiring R orchiectomy. No other known medical diagnoses or hospitalizations.     Surgical History:   R orchiectomy  R wrist ORIF     Social history: never smoker. + cannabis. Denies other drugs. 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     Family history: no history of brain tumor. + diabetes.     Review of systems: As above.    Physical exam:   /80 (BP Location: Right arm, Patient Position: Right side, Cuff Size: Adult Regular)  Pulse 72  Temp 97  F (36.1  C) (Oral)  Resp 16  Wt 96.3 kg (212 lb 6.4 oz)  SpO2 98%    General: Awake and  "alert and in no acute distress.  Pulm: Breathing comfortably on room air  CN: Symmetric browlift, smile, tongue protrusion, palate elevation, and sternocleidomastoids. No dysarthria. Extraocular muscles are all intact. Diplopia on bilateral horizontal gaze and inferior gaze. No diplopia on up gaze. Pupils react bilaterally and equally without afferent pupillary defect. Bilateral temporal field loss L significantly greater than R on direct confrontation. Coordination: Intact finger-nose-finger but more difficult due to vision loss on the L.   Motor: No pronator drift. Good muscle bulk throughout. 5 out of 5 strength in bilateral upper and lower extremities  Reflexes: 2+ reflexes bilateral biceps and patellar tendons.     Imaging:  MRI brain from 4/25/18: large pituitary lesion with mass effect on optic chiasm, Right internal carotid encasement, erosion of the sphenoid sinus and cavernous sinus on the right.     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     Differential diagnosis was discussed with the patient including the further diagnostic workup that is required to determine the best course of action for treatment. We discussed that treatment would have the primary goal of preventing further visual deterioration.  Plan:  - pituitary labs  - pending labs; possible resection in 2-3 weeks with ENT  - will call patient concerning results and plan after pituitary labs result    Patient seen and discussed with MD Eddie Ace \"Sudhir\" MD Elaina, Neurosurgery, PGY-1    I have reviewed the history. I have seen and examined the patient myself and agree with the assessment and plan above. The patient has vision loss secondary to a very large (4 cm) pituitary adenoma. The tumor has expanded the sellar floor and has marked right parasellar extension. The pituitary gland appears to be displaced superiorly and to the left. The optic " apparatus is compressed. The majority of this 45 minute visit was spent discussing management of pituitary tumors. We went over the endoscopic transnasal approach, assuming this will not be a prolactinoma. He understands the risks of surgery including death, stroke, carotid artery injury, CSF leak, panhypopituitarism, infection, hemorrhage, need for reoperation, and agrees to proceed.    ADDENDUM: Prolactin level was within the reference range. We shall proceed with surgery scheduling.    ODALYS Neely MD         Patient tolerated procedure well.

## 2023-01-02 NOTE — PATIENT INSTRUCTIONS
Refer to radiation oncology for residual tumor    Evaluation by Dr. Moncada in neuro-ophthalmology

## 2023-01-02 NOTE — PROGRESS NOTES
Service Date: 2022    Brice Meneses MD  [facility] [address]  [place] [address]    RE:  Vincenzo Avery  MRN: [id number]  : 1975    Dear Dr. Meneses:    We saw Mr. Avery back in [facility] Neurosurgery Clinic/Pituitary Clinic on 2022.  He was accompanied by his significant other, Ms. Renae.    He has undergone 3 resections for a large pituitary macroadenoma involving multiple compartments.  His most recent operation was over 3 months ago.  This was a redo endoscopic endonasal approach with resection of the right parasellar component of the tumor.  We found the tumor to be quite firm and a subtotal resection was achieved.      You will recall that following the second operation, which was a transcranial approach, he developed a right third nerve palsy, complete.  His vision in the left has always been poor from the preoperative compression of the optic apparatus.  His right sided vision has always been the better side, but his right sided vision is essentially nonfunctional due to the third nerve palsy.  His poor vision is limiting his function.  He has had considerable postoperative headaches after each operation, but these have improved considerably.  He is very eager to discuss corrective surgery for his right sided ocular motility and has been evaluated by Dr. Moncada in Neurophthalmology.    PHYSICAL EXAMINATION:  His general appearance was good.  He appears comfortable, but seemed a bit depressed.  His functional limitations with reduced vision has certainly affected him.  The most obvious feature is severe right sided ptosis.  He has a right third nerve palsy, complete, involving the pupil.  His left pupil reacts sluggishly.    The remainder of his neurological examination remains unchanged.    REVIEW OF STUDIES:  We went over his MRI from today.  There remains a large residual tumor in the right parasellar space.  It is smaller than the studies from prior to  2022, consistent with subtotal resection we described previously.  The maximum dimension of the tumor is a little over 3 cm.  The intrasellar component remains completely resected and the optic apparatus remains decompressed, though the tumor is in proximity to the right optic tract.  However, the residual tumor is all extradural.  There is no intrasellar or suprasellar component.    IMPRESSION AND PLAN:  We believe that we performed maximum surgery on Mr. Avery's formidable pituitary adenoma.  Given his young age and the tumor's demonstration of prior growth, he should undergo radiation therapy.  We will refer him to our Radiation team to discuss these options further.  He remains optimistic about recovering some ocular motility with corrective surgery, though this will still be a difficult proposition given the permanent injury to the oculomotor nerve.      We will discuss his case with our Radiation team and with Dr. Moncada.  We will defer his followup MRIs to the Radiation Oncology team since they will require updated imaging.      We will keep you informed of his progress.  Please do not hesitate to contact us with questions.    Sincerely,    Rolf Neely MD        D: 2023   T: 2023   MT: ROSA    Name:     TAMMY AVERY  MRN:      [id number]        Account:      [id number]   :      1975           Service Date: 2022       Document: [id number]

## 2023-01-23 ENCOUNTER — TELEPHONE (OUTPATIENT)
Dept: NEUROSURGERY | Facility: CLINIC | Age: 48
End: 2023-01-23
Payer: COMMERCIAL

## 2023-01-23 NOTE — TELEPHONE ENCOUNTER
Gay calling asking about Disability , I have no outstanding paperwork for Vincenzo.  Gay to call Social Security Disability and ask what is needed.  I would be happy to help complete any forms needed.  Voices understanding.

## 2023-01-27 ENCOUNTER — TELEPHONE (OUTPATIENT)
Dept: NEUROSURGERY | Facility: CLINIC | Age: 48
End: 2023-01-27
Payer: COMMERCIAL

## 2023-01-30 ENCOUNTER — TELEPHONE (OUTPATIENT)
Dept: NEUROSURGERY | Facility: CLINIC | Age: 48
End: 2023-01-30
Payer: COMMERCIAL

## 2023-03-03 ENCOUNTER — CARE COORDINATION (OUTPATIENT)
Dept: NEUROSURGERY | Facility: CLINIC | Age: 48
End: 2023-03-03
Payer: COMMERCIAL

## 2023-03-03 ENCOUNTER — TELEPHONE (OUTPATIENT)
Dept: NEUROSURGERY | Facility: CLINIC | Age: 48
End: 2023-03-03
Payer: COMMERCIAL

## 2023-03-03 NOTE — PROGRESS NOTES
Writer called and left voice mail with Gay asking for call back to let us know if patient had MRI completed.     Alesia Scales LPN  Neurosurgery

## 2023-03-03 NOTE — TELEPHONE ENCOUNTER
Writer spoke with patient and Gay on speaker phone. Patient would like to speak with Dr. Neely about anxiety medication. In addition would like to discuss eye appointment with Dr. Kelly. Patient would like would proceed with eye surgery with Dr. Neil.     Alesia Scales LPN  Neurosurgery

## 2023-03-03 NOTE — TELEPHONE ENCOUNTER
[facility] Call Center    Phone Message    May a detailed message be left on voicemail: yes     Reason for Call: Other: .  MRI has not been completed and they would need the order to be sent to [facility] for them to do it. Please call and discuss.     Action Taken: Other: Neurosurgery    Travel Screening: Not Applicable

## 2023-03-03 NOTE — TELEPHONE ENCOUNTER
Writer faxed MRI order to [facility], returned call Gay to let her know the order was faxed.     Alesia Scales LPN  Neurosurgery

## 2023-03-07 ENCOUNTER — VIRTUAL VISIT (OUTPATIENT)
Dept: NEUROSURGERY | Facility: CLINIC | Age: 48
End: 2023-03-07
Payer: COMMERCIAL

## 2023-03-07 DIAGNOSIS — D35.2 PITUITARY MACROADENOMA (H): Primary | ICD-10-CM

## 2023-03-07 PROCEDURE — 99213 OFFICE O/P EST LOW 20 MIN: CPT | Mod: 93 | Performed by: NEUROLOGICAL SURGERY

## 2023-03-07 NOTE — LETTER
March 7, 2023       TO: Vincenzo Avery  [address]  [place] [address]       DearMr.Bennie,    We are writing to inform you of your test results.    {[facility] results letter list:240505}    No results found from the In Basket message.    ***

## 2023-03-07 NOTE — PATIENT INSTRUCTIONS
Radiation oncology referral (ordered)    We will determine timing of the next MRI depending on the recommendations from radiation oncology    Bring any disability paperwork with you when you see Dr. Moncada later this month

## 2023-03-07 NOTE — PROGRESS NOTES
Uses the right eye in the dark. No headaches unless in bright light.  Some hyperhydrosis over the right brow, when he gets anxious or upset  Some panic attacks  Does not have primary provider.  See dictated note. Visit 15 minutes.  ODALYS Neely MD

## 2023-03-07 NOTE — NURSING NOTE
Is the patient currently in the state of [place]? YES    Visit mode:TELEPHONE    If the visit is dropped, the patient can be reconnected by: TELEPHONE VISIT: Phone number: [phone]    Will anyone else be joining the visit? NO      How would you like to obtain your AVS? MyChart    Are changes needed to the allergy or medication list? NO    Reason for visit:

## 2023-03-07 NOTE — LETTER
3/7/2023       RE: Vincenzo Avery  [address] [place] [address] [address]  [facility] [address]     Dear Colleague,    Thank you for referring your patient, Vincenzo Avery, to the [facility] NEUROSURGERY CLINIC [place] at [facility]. Please see a copy of my visit note below.    Uses the right eye in the dark. No headaches unless in bright light.  Some hyperhydrosis over the right brow, when he gets anxious or upset  Some panic attacks  Does not have primary provider.  See dictated note. Visit 15 minutes.  ODALYS Neely MD        Service Date: 2023    Brice Meneses MD  [address], [address]  [place] [address]    RE:      Vincenzo Avery  MRN:  [id number]  :   1975    Dear Dr. Meneses:    We spoke to Mr. Avery as part of a telephone follow-up in Pituitary Clinic today.  He was accompanied by his significant other.  As you know, he has a large residual pituitary adenoma, centered in the right parasellar space with some persistent suprasellar extension.  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  In general, he has been feeling well.  He does not get headaches unless he has prolonged exposure to bright light.  His right eye is most useful to him in darker settings.  He is able to open the right eye spontaneously.  He also describes some hyperhidrosis over the right side of the forehead, particularly when he gets anxious or upset.  He also has experienced some rare panic attacks.  He does not have a primary clinic at this time.  All his endocrinological review of symptoms was normal.    Over the phone, he sounded well.  His speech, language and phonation were normal.    REVIEW STUDIES:  There were no new studies to review.  His last MRI was nearly 6 months ago and this showed the large residual tumor .    IMPRESSION AND PLAN:  We know that there has been some trouble in Mr. Avery following up with  appointments.  We will recontact our Radiation Oncology team to discuss fractionated radiotherapy for him.  In addition, it sounds like he has a follow-up appointment with Dr. Moncada to discuss the prospects of strabismus surgery.  That appointment is later this month.  We asked him to compile any paperwork he needs for long-term disability and bring it with him at that time.  We can determine timing of his next MRI, depending on the plans made by our Radiation Oncology team.  We will keep you informed of his progress.    Sincerely,          Rolf Neely MD        D: 2023   T: 2023   MT: al    Name:     TAMMY OMER  MRN:      [id number]        Account:      [id number]   :      1975           Service Date: 2023       Document: [id number]

## 2023-03-07 NOTE — LETTER
3/7/2023       RE: Vincenzo Avery  [address] [place] [address] [address]  [facility] [address]     Dear Colleague,    Thank you for referring your patient, Vincenzo Avery, to the [facility] NEUROSURGERY CLINIC [place] at [facility]. Please see a copy of my visit note below.    Uses the right eye in the dark. No headaches unless in bright light.  Some hyperhydrosis over the right brow, when he gets anxious or upset  Some panic attacks  Does not have primary provider.  See dictated note. Visit 15 minutes.  ODALYS Neely MD        Service Date: 2023    Brice Meneses MD  [address], [address]  [place] [address]    RE:      Vincenzo Avery  MRN:  [id number]  :   1975    Dear Dr. Meneses:    We spoke to Mr. Avery as part of a telephone follow-up in Pituitary Clinic today.  He was accompanied by his significant other.  As you know, he has a large residual pituitary adenoma, centered in the right parasellar space with some persistent suprasellar extension.  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  In general, he has been feeling well.  He does not get headaches unless he has prolonged exposure to bright light.  His right eye is most useful to him in darker settings.  He is able to open the right eye spontaneously.  He also describes some hyperhidrosis over the right side of the forehead, particularly when he gets anxious or upset.  He also has experienced some rare panic attacks.  He does not have a primary clinic at this time.  All his endocrinological review of symptoms was normal.    Over the phone, he sounded well.  His speech, language and phonation were normal.    REVIEW STUDIES:  There were no new studies to review.  His last MRI was nearly 6 months ago and this showed the large residual tumor .    IMPRESSION AND PLAN:  We know that there has been some trouble in Mr. Avery following up with  appointments.  We will recontact our Radiation Oncology team to discuss fractionated radiotherapy for him.  In addition, it sounds like he has a follow-up appointment with Dr. Moncada to discuss the prospects of strabismus surgery.  That appointment is later this month.  We asked him to compile any paperwork he needs for long-term disability and bring it with him at that time.  We can determine timing of his next MRI, depending on the plans made by our Radiation Oncology team.  We will keep you informed of his progress.    Sincerely,          Rolf Neely MD        D: 2023   T: 2023   MT: al    Name:     TAMMY OMER  MRN:      [id number]        Account:      [id number]   :      1975           Service Date: 2023       Document: [id number]

## 2023-03-07 NOTE — PROGRESS NOTES
How Severe Is It?: moderate Service Date: 2023    Brice Meneses MD  [facility]  [address], [address]  [place] [address]    RE:      Vincnezo Avery  MRN:  [id number]  :   1975    Dear Dr. Meneses:    We spoke to Mr. Avery as part of a telephone follow-up in Pituitary Clinic today.  He was accompanied by his significant other.  As you know, he has a large residual pituitary adenoma, centered in the right parasellar space with some persistent suprasellar extension.  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  In general, he has been feeling well.  He does not get headaches unless he has prolonged exposure to bright light.  His right eye is most useful to him in darker settings.  He is able to open the right eye spontaneously.  He also describes some hyperhidrosis over the right side of the forehead, particularly when he gets anxious or upset.  He also has experienced some rare panic attacks.  He does not have a primary clinic at this time.  All his endocrinological review of symptoms was normal.    Over the phone, he sounded well.  His speech, language and phonation were normal.    REVIEW STUDIES:  There were no new studies to review.  His last MRI was nearly 6 months ago and this showed the large residual tumor .    IMPRESSION AND PLAN:  We know that there has been some trouble in Mr. Avery following up with appointments.  We will recontact our Radiation Oncology team to discuss fractionated radiotherapy for him.  In addition, it sounds like he has a follow-up appointment with Dr. Moncada to discuss the prospects of strabismus surgery.  That appointment is later this month.  We asked him to compile any paperwork he needs for long-term disability and bring it with him at that time.  We can determine timing of his next MRI, depending on the plans made by our Radiation Oncology team.  We will keep you informed of his progress.    Sincerely,          Rolf Neely,  Is This A New Presentation, Or A Follow-Up?: Follow Up Isotretinoin MD        D: 2023   T: 2023   MT: al    Name:     TAMMY OMER  MRN:      [id number]        Account:      [id number]   :      1975           Service Date: 2023       Document: [id number]   Additional History: Pt’s mom would like to get blood work done for the patient due to him being on a higher dose.

## 2023-03-09 ENCOUNTER — TELEPHONE (OUTPATIENT)
Dept: NEUROSURGERY | Facility: CLINIC | Age: 48
End: 2023-03-09
Payer: COMMERCIAL

## 2023-03-09 NOTE — TELEPHONE ENCOUNTER
Writer routed to Neurosurgery Nurse Pool  Attn: Alina Negron, RNCC for Dr. Neely   Medication for MRI     Alesia Scales LPN  Neurosurgery

## 2023-03-09 NOTE — TELEPHONE ENCOUNTER
Spoke with Gay, Gay states RT said he would order a generalized medication for panic attacks for patient.  Will check with RT and callback   Voices understanding.

## 2023-03-09 NOTE — TELEPHONE ENCOUNTER
Call back to number heath, Gay wife.    Leaving message to please callback, I am not sure what test is tomorrow, who ordered the imaging, and what Vincenzo has tried in the past for anxiety medication.  Any allergy to anxiety med?  Valium?   Please call back no history of premedication for me to go on.  Thanks you. Call Alina MEDINA

## 2023-03-09 NOTE — TELEPHONE ENCOUNTER
[facility] Call Center    Phone Message    May a detailed message be left on voicemail: yes     Reason for Call: Other: Patient's spouse is calling in regarding to getting anxiety medication for the patient by tomorrow. Please call back to discuss she mentioned it was talked about during the last visit.      Action Taken: Other: Neurosurgery    Travel Screening: Not Applicable

## 2023-07-03 ENCOUNTER — TRANSFERRED RECORDS (OUTPATIENT)
Dept: HEALTH INFORMATION MANAGEMENT | Facility: CLINIC | Age: 48
End: 2023-07-03
Payer: COMMERCIAL

## 2023-07-07 ENCOUNTER — OFFICE VISIT (OUTPATIENT)
Dept: OPHTHALMOLOGY | Facility: CLINIC | Age: 48
End: 2023-07-07
Attending: OPHTHALMOLOGY
Payer: COMMERCIAL

## 2023-07-07 DIAGNOSIS — H53.10 SUBJECTIVE VISUAL DISTURBANCE: ICD-10-CM

## 2023-07-07 DIAGNOSIS — H49.01 RIGHT OCULOMOTOR NERVE PALSY: Primary | ICD-10-CM

## 2023-07-07 DIAGNOSIS — H53.2 DOUBLE VISION: ICD-10-CM

## 2023-07-07 PROCEDURE — G0463 HOSPITAL OUTPT CLINIC VISIT: HCPCS | Performed by: OPHTHALMOLOGY

## 2023-07-07 PROCEDURE — 92060 SENSORIMOTOR EXAMINATION: CPT | Performed by: OPHTHALMOLOGY

## 2023-07-07 PROCEDURE — 99214 OFFICE O/P EST MOD 30 MIN: CPT | Performed by: OPHTHALMOLOGY

## 2023-07-07 ASSESSMENT — SLIT LAMP EXAM - LIDS: COMMENTS: NORMAL

## 2023-07-07 ASSESSMENT — TONOMETRY
IOP_METHOD: ICARE
OS_IOP_MMHG: 14
OD_IOP_MMHG: 13

## 2023-07-07 ASSESSMENT — VISUAL ACUITY
METHOD: SNELLEN - LINEAR
OD_SC: 20/25
OS_SC: 20/30
OS_SC+: -2
OD_SC+: -2/+2

## 2023-07-07 ASSESSMENT — CUP TO DISC RATIO
OS_RATIO: 0.3
OD_RATIO: 0.2

## 2023-07-07 ASSESSMENT — CONF VISUAL FIELD
OS_SUPERIOR_NASAL_RESTRICTION: 2
METHOD: COUNTING FINGERS
OS_INFERIOR_NASAL_RESTRICTION: 2

## 2023-07-07 ASSESSMENT — MARGIN REFLEX DISTANCE: OD_MRD1: 0-1

## 2023-07-07 NOTE — LETTER
2023    RE: Vincenzo Avery  : 1975  MRN: [id number]    Dear Providers,    I saw our mutual patient, Vincenzo Avery, in follow-up in my clinic recently.  After a thorough neuro-ophthalmic history and examination, I came to the following conclusions:     1. 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 His afferent visual function today including visual acuity and color vision testing are stable in both eyes compared to our last visit.    Review of his recent MRI shows residual tumor mostly in his right cavernous sinus without any tumor that would appear to be compressing the anterior visual pathways.    2. 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 Patient has a very complex form of strabismus and a complicated clinical picture.  His left eye has more afferent vision loss and optic atrophy longstanding from his original compressive tumor (there is no longer active compression). 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    Since or last visit over 1 year ago there has been a mild improvement in his ability to elevate his right upper lid with frontalis recruitment and a mild improvement in his ability to adduct close to midline in the right eye.    We discussed the risks, benefits, and alternatives of strabismus surgery in his particular case.  Strabismus surgery would NOT be expected even under the best of outcomes to provide the patient with MEANINGFUL single binocular vision. At best we may be able to achieve a tiny window of single binocular vision somewhere near primary gaze but if he tried to track images or just moved his eyes slightly from that position he would again have binocular diplopia.  Strabismus surgery WOULD likely be able to improve his maintenance of gaze of the right  eye closer to midline and would render him some reconstructive benefit of eyes that look more aligned.   There would be a very small risk of vision loss in the right eye from surgery.  After reviewing all this information the patient will consider his options and let us know later if he wants to proceed.    I do not recommend combining his eyelid ptosis surgery and his strabismus surgery because his binocular diplopia will certainly worsen when we elevated his right eyelid and right now he is able to elevate the right upper lid on his own with frontalis recruitment (while also being able to close the left eye) hence avoiding / limiting binocular diplopia.    Patient to see radiation oncology also about his residual tumor in the cavernous sinus region.  Radiation to this region could potentially change his strabismus pattern in the future as could continued growth of the tumor.    Historical data including initial visit HPI:  He had 1-1.5 year history of progressive left eye temporal > right temporal vision loss which prompted work-up which revealed pituitary macroadenoma. He underwent resection with Dr. Neely on June 29, 2018. After initial resection, no substantial change in left eye with only a small crescent of nasal vision in the left eye.     At that time, functionally felt vision was essentially normal in the right eye. Vision was fairly stable. On MRI, Sept 2020 in setting fall and concussion, showed changes concerning for recurrence with an intradural component compression right optic tract and then Dr. Neely pursued additional imaging. He underwent recurrent resective surgery (right frontal craniotomy) on 3/22/2021, which was complicated by transection of the right cranial nerve 3. Symptomatically, complete right ptosis, dilated right eye, and down and out eye when eyelid held up. Visual acuity in right eye is still intact. Diplopia when both eyes are held open.     Interestingly, he has noted changes  in his left eye since surgery on 3/22/2021 with improvement in the restricted visual field. He central scotoma, but improvement in peripheral vision. He feels that visual in left eye is often better at night, but his left vision is still blurry. He thinks this blurred vision is contributing to his headache.     Historical migraine headache.     Prior  testing:  June 29, 2018: MRI brain with and without IV contrast:  Findings: Limited imaging performed for the purposes of stereotactic  technique demonstrates no significant change in predominantly solid  enhancing 4.5 x 3.9 x 5.0 cm sellar/parasellar mass with central  cystic component. The mass invades the right cavernous sinus and  encases the right internal carotid artery. Superior displacement and  splaying of the optic chiasm and cisternal optic nerves. Bony erosion  and depression of the floor of the sella with extension of tumor into  the sphenoid sinuses and right posterior ethmoid air cells. The mass  laterally displaces the right mesial temporal lobe, flattens the harsha  and posterior displaces the right cerebral peduncle.     September 25, 2018: MRI brain with and without IV contrast  Impression: As previously, findings of prior sellar and parasellar  mass resection, with residual right cavernous sinus and parasellar  mass (presumed residual pituitary macroadenoma) that continues to  mildly indent the right medial temporal lobe. Overall no significant  change in size.     March 23, 2021: MRI brain with and without IV contrast  Impression:  1. Postoperative changes consistent with right frontal temporal  craniotomy and partial tumor resection.  2. Vasogenic edema and mass effect in the right cranial fossa similar  to prior.   a. Unchanged 6 mm leftward midline shift.   b. Mass effect on the harsha and midbrain similar to prior.  3. Postoperative blood products accumulation deep to the craniotomy.  Additional blood products accumulation anterior to the harsha  and  midbrain, and posterior to the left cerebellum.    Past medical history: Chronic headache in setting pituitary macroadenoma     Family history / social history:   by trade     Interim history since last visit with me (2021):  He states that about 6 mouths after the surgery, he was practicing opening his right eye lid with his hand. And after about 2 months, he was able to open the right eyelid spontaneously and it was gradually getting better. He is able to open both eyelid at the same time but he perfers not to due to his double vision. However, there is not much improvement with ocular movement. With both eyes open, he experiences double vision. It gets so bad that it gives me terrible headache, so he alternates between both eyes. He states that vision of his right eye is better than the left. The left eye vision feels grainy and pale and struggles with color vision.    Imagin2022 -- MR BRAIN WWO      Review of outside clinic notes  2023 -- Vitrual visit with Dr. Neely      2022 -- Visit with Dr. Thorpe      Exam: Please see epic chart for complete exam     Tests ordered and interpreted today: Sensorimotor exam shows essentially stable complete right cranial nerve 3 palsy. There is perhaps mild improvement in his right eye's ability to adduct to midline.    We discussed in detail the risks, benefits, and alternatives of eye muscle correction surgery including the very rare risk of death or serious morbidity from a general anesthesia complication and the rare risk of severe vision loss in the operative eye(s) secondary to retinal detachment or endophthalmitis.  We discussed more likely sub-optimal outcomes including the unanticipated need for additional strabismus surgery. Discussed realistic goals of surgery including reconstructive benefit and better ability to maintain primary gaze with the right eye under monocular conditions but no significant improvement in  binocular diplopia- discussed it would be VERY unlikely that strabismus surgery would provide meaningful single binocular vision.    After a thorough discussion of these risks, the patient decided to consider strabismus surgery.  The surgical plan would be as follows:     1. Eye muscle correction, right eye.    Would maximally resection right medial rectus and extirpate right lateral rectus    Follow-up 1 week after strabismus surgery.    Plan to operate at: ASC  Prism free glasses for adjustment: Non adjustable                For further exam details, please feel free to contact our office for additional records.  If you wish to contact me regarding this patient please email me at [email or web address] or give my clinic a call to arrange a phone conversation.    Sincerely,    Albert Moncada MD  , Neuro-Ophthalmology and Adult Strabismus Surgery  [facility] in Neuro-Ophthalmology  Department of Ophthalmology and Visual Neurosciences  [facility]

## 2023-07-07 NOTE — PROGRESS NOTES
1. 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 His afferent visual function today including visual acuity and color vision testing are stable in both eyes compared to our last visit.    Review of his recent MRI shows residual tumor mostly in his right cavernous sinus without any tumor that would appear to be compressing the anterior visual pathways.    2. 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 Patient has a very complex form of strabismus and a complicated clinical picture.  His left eye has more afferent vision loss and optic atrophy longstanding from his original compressive tumor (there is no longer active compression). 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    Since or last visit over 1 year ago there has been a mild improvement in his ability to elevate his right upper lid with frontalis recruitment and a mild improvement in his ability to adduct close to midline in the right eye.    We discussed the risks, benefits, and alternatives of strabismus surgery in his particular case.  Strabismus surgery would NOT be expected even under the best of outcomes to provide the patient with MEANINGFUL single binocular vision. At best we may be able to achieve a tiny window of single binocular vision somewhere near primary gaze but if he tried to track images or just moved his eyes slightly from that position he would again have binocular diplopia.  Strabismus surgery WOULD likely be able to improve his maintenance of gaze of the right eye closer to midline and would render him some reconstructive benefit of eyes that look more aligned.   There would be a very small risk of vision loss in the right eye from surgery.  After reviewing all this information the patient will consider his options and let  us know later if he wants to proceed.    I do not recommend combining his eyelid ptosis surgery and his strabismus surgery because his binocular diplopia will certainly worsen when we elevated his right eyelid and right now he is able to elevate the right upper lid on his own with frontalis recruitment (while also being able to close the left eye) hence avoiding / limiting binocular diplopia.    Patient to see radiation oncology also about his residual tumor in the cavernous sinus region.  Radiation to this region could potentially change his strabismus pattern in the future as could continued growth of the tumor.      Historical data including initial visit HPI:  He had 1-1.5 year history of progressive left eye temporal > right temporal vision loss which prompted work-up which revealed pituitary macroadenoma. He underwent resection with Dr. Neely on June 29, 2018. After initial resection, no substantial change in left eye with only a small crescent of nasal vision in the left eye.     At that time, functionally felt vision was essentially normal in the right eye. Vision was fairly stable. On MRI, Sept 2020 in setting fall and concussion, showed changes concerning for recurrence with an intradural component compression right optic tract and then Dr. Neely pursued additional imaging. He underwent recurrent resective surgery (right frontal craniotomy) on 3/22/2021, which was complicated by transection of the right cranial nerve 3. Symptomatically, complete right ptosis, dilated right eye, and down and out eye when eyelid held up. Visual acuity in right eye is still intact. Diplopia when both eyes are held open.     Interestingly, he has noted changes in his left eye since surgery on 3/22/2021 with improvement in the restricted visual field. He central scotoma, but improvement in peripheral vision. He feels that visual in left eye is often better at night, but his left vision is still blurry. He thinks this  blurred vision is contributing to his headache.     Historical migraine headache.     Prior  testing:  June 29, 2018: MRI brain with and without IV contrast:  Findings: Limited imaging performed for the purposes of stereotactic  technique demonstrates no significant change in predominantly solid  enhancing 4.5 x 3.9 x 5.0 cm sellar/parasellar mass with central  cystic component. The mass invades the right cavernous sinus and  encases the right internal carotid artery. Superior displacement and  splaying of the optic chiasm and cisternal optic nerves. Bony erosion  and depression of the floor of the sella with extension of tumor into  the sphenoid sinuses and right posterior ethmoid air cells. The mass  laterally displaces the right mesial temporal lobe, flattens the harsha  and posterior displaces the right cerebral peduncle.     September 25, 2018: MRI brain with and without IV contrast  Impression: As previously, findings of prior sellar and parasellar  mass resection, with residual right cavernous sinus and parasellar  mass (presumed residual pituitary macroadenoma) that continues to  mildly indent the right medial temporal lobe. Overall no significant  change in size.     March 23, 2021: MRI brain with and without IV contrast  Impression:  1. Postoperative changes consistent with right frontal temporal  craniotomy and partial tumor resection.  2. Vasogenic edema and mass effect in the right cranial fossa similar  to prior.   a. Unchanged 6 mm leftward midline shift.   b. Mass effect on the harsha and midbrain similar to prior.  3. Postoperative blood products accumulation deep to the craniotomy.  Additional blood products accumulation anterior to the harsha and  midbrain, and posterior to the left cerebellum.    Past medical history:  Chronic headache in setting pituitary macroadenoma     Family history / social history:   by [employer]     Interim history since last visit with me (04/07/2021):  He states that  about 6 mouths after the surgery, he was practicing opening his right eye lid with his hand. And after about 2 months, he was able to open the right eyelid spontaneously and it was gradually getting better. He is able to open both eyelid at the same time but he perfers not to due to his double vision. However, there is not much improvement with ocular movement. With both eyes open, he experiences double vision. It gets so bad that it gives me terrible headache, so he alternates between both eyes. He states that vision of his right eye is better than the left. The left eye vision feels grainy and pale and struggles with color vision.    Imagin2022 -- MR BRAIN WWO      Review of outside clinic notes  2023 -- Vitrual visit with Dr. Neely      2022 -- Visit with Dr. Thorpe      Exam:  Please see epic chart for complete exam       Tests ordered and interpreted today:  Sensorimotor exam shows essentially stable complete right cranial nerve 3 palsy. There is perhaps mild improvement in his right eye's ability to adduct to midline.    We discussed in detail the risks, benefits, and alternatives of eye muscle correction surgery including the very rare risk of death or serious morbidity from a general anesthesia complication and the rare risk of severe vision loss in the operative eye(s) secondary to retinal detachment or endophthalmitis.  We discussed more likely sub-optimal outcomes including the unanticipated need for additional strabismus surgery. Discussed realistic goals of surgery including reconstructive benefit and better ability to maintain primary gaze with the right eye under monocular conditions but no significant improvement in binocular diplopia- discussed it would be VERY unlikely that strabismus surgery would provide meaningful single binocular vision.    After a thorough discussion of these risks, the patient decided to consider strabismus surgery.  The surgical plan would be as  follows:     1. Eye muscle correction, right eye.    Would maximally resection right medial rectus and extirpate right lateral rectus    Follow-up 1 week after strabismus surgery.    Plan to operate at: [facility]  Prism free glasses for adjustment: Non adjustable       Homa Guillaume MS4  Medical Student, [facility]    35 minutes were spent on the date of the encounter by me doing chart review, history and exam, documentation, and further activities as noted above    Complete documentation of historical and exam elements from today's encounter can be found in the full encounter summary report (not reduplicated in this progress note).  I personally re-obtained the chief complaint(s) and history of present illness.  I confirmed and edited as necessary the review of systems, past medical/surgical history, family history, social history, and examination findings as documented by others; and I examined the patient myself.  I personally reviewed the relevant tests, images, and reports as documented above.  I formulated and edited as necessary the assessment and plan and discussed the findings and management plan with the patient and family     A medical student was involved in the care of the patient. I was present with the medical student who participated in the service and in the documentation of the note. I have  verified the history and personally performed the physical exam and medical decision making. I extensively reviewed and edited when necessary the assessment and plan. I agree with the assessment and plan of care as documented in the note    Albert Moncada MD

## 2023-07-30 ENCOUNTER — HEALTH MAINTENANCE LETTER (OUTPATIENT)
Age: 48
End: 2023-07-30

## 2023-08-10 NOTE — LETTER
"2021       RE: Vincenzo Avery   [address]  [place] [address]     Dear Colleague,    Thank you for referring your patient, Vincenzo Avery, to the [facility] [place] at [facility]. Please see a copy of my visit note below.      [facility]      Name: Vincenzo Avery  MRN: [id number]  Age: 46 year old  : 1975  2021      Chief Complaint:   Recurrent pituitary macroadenoma s/p EEA for resection 2018 with regrowth now s/p right frontotemporal craniotomy for resection 3/22/2021, 2 week postop     History of Present Illness:   Vincenzo Avery is a pleasant 46 year old male with a history of NIC and migraine headaches, here for a 2 week postoperative visit. He's accompanied by his girlfriend, Gay. He underwent a right frontotemporal craniotomy for pituitary adenoma resection on 3/22/2021 with Dr. Neely. He has a postoperative right 3rd nerve palsy and saw Dr. Moncada in NeuroOphthalmology today for evaluation after surgery. He is struggling with head pain and is requesting more opioids today. He also notes that he has chronic low back pain and that seems worse since surgery. He denies worsening headaches, fever, vision changes other than mentioned, paresthesias, or weakness.       Review of Systems:   Pertinent items are noted in HPI or as in patient entered ROS below, remainder of complete ROS is negative.     Physical Exam:   /83   Pulse 83   Resp 16   Ht 1.753 m (5' 9\")   Wt 98.9 kg (218 lb)   SpO2 97%   BMI 32.19 kg/m    General: No acute distress.     Resp: No respiratory distress.   MSK: Moves all extremities.  No obvious deformity.  Neuro: The patient is fully oriented. Speech is normal. He has a complete right CN3 palsy with right ptosis. Left eye EOM intact. Facial sensation is intact in V1, V2, V3 distributions. Facial nerve function is normal save his right " ptosis. Full strength in all extremities. Gait is normal.  Psych: Normal mood and affect. Behavior is normal.    Incision: His right frontotemporal incision is clean, dry, intact and well healed. Sutures were removed today using sterile instrumentation, no complications. He has mild swelling and fluid underneath suture line without erythema or drainage. I also removed his lumbar drain incision (monocryl) as it was irritating.    Imaging:  No new imaging today     Assessment:  1. Recurrent pituitary macroadenoma s/p EEA for resection 6/29/2018 with regrowth now s/p right frontotemporal craniotomy for resection 3/22/2021, 2 week postop  2. Postoperative complete right 3rd nerve palsy  3. Suture removal    Plan:  1. Will refill hydrocodone one more time and asked him to work hard on transitioning to tylenol and ibuprofen, alternating, for pain management. Follow up in 3 months with Dr. Neely, with MRI prior to visit.  2. Per Dr. Moncada, who will continue to follow. Patient has a follow up appointment scheduled with Dr. Thorpe in 1 month.  3. Wound care discussed, follow up prn.    We discussed genetic testing of his adenoma given the aggressive nature of it's growth and he would like to proceed. Consent paperwork signed today.    He was encouraged to call with any new questions or concerns.     Marisela Bernal PA-C       contact guard

## 2023-09-21 ENCOUNTER — TELEPHONE (OUTPATIENT)
Dept: NEUROSURGERY | Facility: CLINIC | Age: 48
End: 2023-09-21
Payer: COMMERCIAL

## 2023-09-21 NOTE — TELEPHONE ENCOUNTER
"[facility] Call Center    Phone Message    May a detailed message be left on voicemail: yes     Reason for Call: Other: Patient called requesting to speak with Alesia Scales RN regarding questions she has about medication that \"Alesia already knows about\". Please review.     Action Taken: Message routed to:  [facility] Neurosurgery    Travel Screening: Not Applicable                                                                   "

## 2023-09-26 NOTE — TELEPHONE ENCOUNTER
Writer returned call and spoke with Gay. Patient Mom is calling regarding medication for panic attacks.     Alesia Scales LPN  Neurosurgery

## 2023-10-01 NOTE — TELEPHONE ENCOUNTER
Spoke to pt's partner, pt not available. Left message to call eye clinic and schedule next available appointment with Dr. Moncada.     Leigh Perry on 9/7/2022 at 2:57 PM   
Home

## 2024-01-25 ENCOUNTER — TELEPHONE (OUTPATIENT)
Dept: NEUROSURGERY | Facility: CLINIC | Age: 49
End: 2024-01-25

## 2024-01-25 NOTE — TELEPHONE ENCOUNTER
[facility] Call Center    Phone Message    May a detailed message be left on voicemail: yes     Reason for Call: Form or Letter   Type or form/letter needing completion: 1/31  Provider: Chin  Date form needed: 1/30  Once completed: Fax form to: both patient and company. Patients girlfriend is requesting to speak to Saleem about this form. She said she has been working with a Saleem from [facility] office regarding this form. Girlfriend stated she needs to have this done for sure by the end of the month.  Girlfriend also is inquiring about when the next MRI should be.     Action Taken: Message routed to:  [facility] Neurosurgery    Travel Screening: Not Applicable

## 2024-01-30 ENCOUNTER — CARE COORDINATION (OUTPATIENT)
Dept: NEUROSURGERY | Facility: CLINIC | Age: 49
End: 2024-01-30

## 2024-01-30 PROBLEM — D35.2 PITUITARY MACROADENOMA (H): Status: ACTIVE | Noted: 2021-03-01

## 2024-01-30 NOTE — PROGRESS NOTES
Writer faxed Medical Opinion Form to [facility] and [facility]   Fax: [phone]. Faxed form to [facility] and sent patient MyChart message.     Alesia Scales LPN  Neurosurgery

## 2024-02-29 ENCOUNTER — MYC MEDICAL ADVICE (OUTPATIENT)
Dept: NEUROSURGERY | Facility: CLINIC | Age: 49
End: 2024-02-29

## 2024-03-01 NOTE — TELEPHONE ENCOUNTER
Attn: Alina Negron, RNCC for Dr. Neely   *Patient reports injury to head     Alesia Scales LPN  Neurosurgery

## 2024-09-22 ENCOUNTER — HEALTH MAINTENANCE LETTER (OUTPATIENT)
Age: 49
End: 2024-09-22

## 2025-01-04 NOTE — ANESTHESIA PROCEDURE NOTES
Airway       Patient location during procedure: OR  Staff -        Anesthesiologist:  David Escalante MD       Resident/Fellow: Chay Delarosa MD       Performed By: anesthesiologist and residentIndications and Patient Condition       Indications for airway management: woody-procedural       Induction type:intravenous       Mask difficulty assessment: 2 - vent by mask + OA or adjuvant +/- NMBA    Final Airway Details       Final airway type: endotracheal airway       Successful airway: ETT - single  Endotracheal Airway Details        ETT size (mm): 7.5       Cuffed: yes       Successful intubation technique: direct laryngoscopy       DL Blade Type: MAC 3       Grade View of Cords: 2       Adjucts: stylet       Position: Right       Measured from: lips       Secured at (cm): 24       Bite block used: Soft    Post intubation assessment        Placement verified by: capnometry and chest rise        Number of attempts at approach: 2 (Resident attempt unsuccesful, mask ventilated with 2 hand mask and oral airway. Physican placed single attempt.)       Secured with: plastic tape       Ease of procedure: easy       Dentition: Intact    Medication(s) Administered   Medication Administration Time: 3/22/2021 8:25 AM            
Arterial Line Procedure Note  Pre-Procedure   Staff -        Anesthesiologist:  David Escalante MD       Resident/Fellow: Ashley Slater MD       Performed By: resident       Location: OR       Pre-Anesthestic Checklist: patient identified, IV checked, risks and benefits discussed, informed consent, monitors and equipment checked, pre-op evaluation and at physician/surgeon's request  Timeout:       Correct Patient: Yes        Correct Procedure: Yes        Correct Site: Yes        Correct Position: Yes   Procedure   Procedure: arterial line       Laterality: left       Insertion Site: radial.  Sterile Prep        Standard elements of sterile barrier followed       Skin prep: Chloraprep  Insertion/Injection        Technique: Seldinger Technique        Catheter Type/Size: 20 G, 1.75 in/4.5 cm quick cath (integral wire)  Narrative         Secured by: anchor securement device       Tegaderm dressing used.       Complications: Hematoma (attempted once on the right side and was unsuccessful. Minor hematoma formation. Pulses intact distally),        Arterial waveform: Yes        IBP within 10% of NIBP: Yes        
No

## 2025-02-13 ENCOUNTER — MYC MEDICAL ADVICE (OUTPATIENT)
Dept: NEUROSURGERY | Facility: CLINIC | Age: 50
End: 2025-02-13

## 2025-03-03 ENCOUNTER — CARE COORDINATION (OUTPATIENT)
Dept: NEUROSURGERY | Facility: CLINIC | Age: 50
End: 2025-03-03

## 2025-03-03 NOTE — PROGRESS NOTES
Writer completed paperwork, faxed to [facility] [phone]. Faxed to HIM. Sent patient [employer] message.     Alesia Scales LPN  Neurosurgery

## 2025-06-11 NOTE — PLAN OF CARE
Problem: Patient Care Overview  Goal: Plan of Care/Patient Progress Review  PT 6A: PT order for evaluation and treatment acknowledged. PT cancelled today. Pt having nausea, vomiting, headache and is scheduled for CT scan. OT will see first if possible, tomorrow. PT is rescheduled for 7/2/18.       oral

## (undated) DEVICE — BUR STRK 1.5MM TAPERED ROUTER FA1 5407-FA1-015

## (undated) DEVICE — SOL WATER IRRIG 1000ML BOTTLE 2F7114

## (undated) DEVICE — SU VICRYL 0 CT-1 CR 8X18" J740D

## (undated) DEVICE — DRAPE SHEET MED 44X70" 9355

## (undated) DEVICE — ESU MINI EPIDURAL VEIN SEALER AQUAMANTIS 3.4MM 23-314-1

## (undated) DEVICE — CUSA 36KHZ WRENCH TORQUE CLARITY C7602

## (undated) DEVICE — TAPE MICROFOAM 4" 1528-4

## (undated) DEVICE — SPONGE COTTONOID 1/2X3" 20-07S

## (undated) DEVICE — PROTECTOR ARM ONE-STEP TRENDELENBURG 40418

## (undated) DEVICE — SPONGE SURGIFOAM 100 1974

## (undated) DEVICE — STRAP UNIVERSAL POSITIONING 2-PIECE 4X47X76" 91-287

## (undated) DEVICE — LINEN TOWEL PACK X30 5481

## (undated) DEVICE — Device

## (undated) DEVICE — GLOVE PROTEXIS MICRO 7.0  2D73PM70

## (undated) DEVICE — SU MONOCRYL 4-0 PS-2 27" UND Y426H

## (undated) DEVICE — SU CHROMIC 4-0 RB-1 27" U203H

## (undated) DEVICE — DRSG GAUZE 4X4" TRAY 6939

## (undated) DEVICE — LINEN TOWEL PACK X5 5464

## (undated) DEVICE — BUR BALL 4MM DIAMOND 15CM ANSPACH MA15-4D

## (undated) DEVICE — KIT PATIENT POSITIONING PIGAZZI LATEX FREE 40580

## (undated) DEVICE — PACK CRANIOTOMY

## (undated) DEVICE — ENDO INSERT BLADE KNIFE MICRO STORZ SICKLE 28164MS/3

## (undated) DEVICE — SU ETHILON 2-0 FS 18" 664H

## (undated) DEVICE — SPLINT NASAL DOYLE BREATHEASY 20-10500

## (undated) DEVICE — DRAPE POUCH INSTRUMENT 1018

## (undated) DEVICE — PACK GOWN 3/PK DISP XL SBA32GPFCB

## (undated) DEVICE — WIPE INSTRUMENT MEROCEL 400200

## (undated) DEVICE — RX BACITRACIN OINTMENT 0.9G 1/32OZ CUR001109

## (undated) DEVICE — BLADE SHAVER SERRATED 4MM ROTATE 1884002HRE

## (undated) DEVICE — SPONGE RAY-TEC 4X8" 7318

## (undated) DEVICE — LINEN TOWEL PACK X6 WHITE 5487

## (undated) DEVICE — PREP CHLORAPREP CLEAR 3ML 930400

## (undated) DEVICE — SLEEVE IRRIGATION STRK ELITE 7.0CM 5/PKG 5407-010-450

## (undated) DEVICE — TUBING STRYKER IRRIGATION CASSETTE 5400-050-001

## (undated) DEVICE — SOL RINGERS LACTATED 1000ML BAG 07953-09

## (undated) DEVICE — DRSG TELFA 3X8" 1238

## (undated) DEVICE — PREP DURAPREP 26ML APL 8630

## (undated) DEVICE — ESU ELEC BLADE 6" COATED E1450-6

## (undated) DEVICE — SOL NACL 0.9% 10ML VIAL 0409-4888-02

## (undated) DEVICE — DRSG TEGADERM 2 3/8X2 3/4" 1624W

## (undated) DEVICE — IOM SUPPLIES/CASE FEE

## (undated) DEVICE — APPLICATOR EXTENDED TIP DURASEAL 8CM 205108

## (undated) DEVICE — ENDO SHEATH STORZ SHARPSITE ENDOSCRUB 30DEG 4MM 1912010

## (undated) DEVICE — SUCTION CATH AIRLIFE TRI-FLO W/CONTROL PORT 14FR  T60C

## (undated) DEVICE — NIM PROBE PRASS INCREMENTING TIP 8225825

## (undated) DEVICE — SLEEVE IRRIGATION MIS 13.0CM 5/PKG 5407-010-950

## (undated) DEVICE — SPONGE COTTONOID 1/2X1/2" 20-04S

## (undated) DEVICE — RX VISTASEAL FIBRIN SEALANT W/THROMBIN 10ML VST10

## (undated) DEVICE — SYR 03ML LL W/O NDL 309657

## (undated) DEVICE — DRSG PRIMAPORE 03 1/8X6" 66000318

## (undated) DEVICE — BLADE FEATHER MIZUHO K-6400

## (undated) DEVICE — SU ETHILON 3-0 PS-1 18" 1663H

## (undated) DEVICE — CATH TRAY FOLEY SURESTEP 16FR W/TMP PRB STLK LATEX A319416AM

## (undated) DEVICE — APPLICATOR COTTON TIP 6"X2 STERILE LF 6012

## (undated) DEVICE — SPONGE SURGIFOAM 01GM POWDER 1978

## (undated) DEVICE — SYR 10ML LL W/O NDL 302995

## (undated) DEVICE — BUR STRK ROUND DIAMOND 4.0MM COARSE 8450-012-040DC

## (undated) DEVICE — SPONGE COTTONOID 1/4X1/4" 20-01S

## (undated) DEVICE — DRAPE ISOLATION BAG 1003

## (undated) DEVICE — PACK NEURO MINOR UMMC SNE32MNMU4

## (undated) DEVICE — BLADE KNIFE SURG 11 371111

## (undated) DEVICE — PIN SKULL MAYFIELD ADULT TITANIUM 3/PK A1120

## (undated) DEVICE — EYE PREP BETADINE 5% SOLUTION 30ML 0065-0411-30

## (undated) DEVICE — DRAPE MAYO STAND 23X54 8337

## (undated) DEVICE — BONE WAX 2.5GM W31G

## (undated) DEVICE — GOWN XLG DISP 9545

## (undated) DEVICE — CUSA 36KHZ TIP CVD EXT MICROTIP CLARITY C74115

## (undated) DEVICE — CATH TRAY FOLEY 16FR BARDEX W/TEMP PRB URINE METER 319416AM

## (undated) DEVICE — SHUNT BECKER EXTERNAL DRAIN 46128

## (undated) DEVICE — DECANTER BAG 2002S

## (undated) DEVICE — NDL 25GA 2"  8881200441

## (undated) DEVICE — SYR EAR BULB 3OZ 0035830

## (undated) DEVICE — SPONGE COTTONOID 1/2X1 1/2" 20-06S

## (undated) DEVICE — ESU SUCTION CAUTERY 10FR FOOT CONTROL E2505-10FR

## (undated) DEVICE — SYRINGE MNJCT 12ML LF STRL LL TIP MED PP DISP

## (undated) DEVICE — DRAPE SHEET REV FOLD 3/4 9349

## (undated) DEVICE — DRSG TEGADERM 4X4 3/4" 1626W

## (undated) DEVICE — DRAPE WARMER 66X44" ORS-300

## (undated) DEVICE — ESU GROUND PAD ADULT W/CORD E7507

## (undated) DEVICE — BUR STRK CARBIDE ROUND 5.0MM 5820-110-050C

## (undated) DEVICE — DECANTER VIAL 2006S

## (undated) DEVICE — SU VICRYL 2-0 CT-2 CR 8X18" J726D

## (undated) DEVICE — CLIP HORIZON MED BLUE 002200

## (undated) DEVICE — TUBING SUCTION MEDI-VAC SOFT 3/16"X20' N520A

## (undated) DEVICE — ENDO SHEATH STORZ SHARPSITE ENDOSCRUB 0DEG 4MM 1912000

## (undated) DEVICE — TUBING SET ASPIRATION W/EXT SONOPET  LF 5450-850-003

## (undated) DEVICE — PACKING NUGAUZE 1/2" PLAIN 7632

## (undated) DEVICE — SPONGE COTTONOID 1/2X1/2" 80-1400

## (undated) DEVICE — ESU ELEC NDL 6" COATED/INSULATED E1465-6

## (undated) DEVICE — DRAPE CRANIOTOMY W/POUCH 9450

## (undated) DEVICE — SLEEVE IRRIGATION STRK ELITE 12.0CM 5407-010-470

## (undated) DEVICE — CORETEMP FLUID WARMING DRAPE

## (undated) DEVICE — SURGICEL HEMOSTAT 4X8" 1952

## (undated) DEVICE — SYR 10ML FINGER CONTROL W/O NDL 309695

## (undated) DEVICE — ANTIFOG SOLUTION W/FOAM PAD CF-1002

## (undated) DEVICE — ESU PENCIL SMOKE EVAC W/ROCKER SWITCH 0703-047-000

## (undated) DEVICE — SYR 30ML LL W/O NDL 302832

## (undated) DEVICE — COVER CAMERA VIDEO LASER 9X96" 04-CC227

## (undated) DEVICE — CLIP HORIZON SM RED WIDE SLOT 001201

## (undated) DEVICE — CUSA TUBE QUICK CONNECT CLARITY C7300

## (undated) DEVICE — BUR STRK ROUND DIAMOND 4.0MM COARSE 5820-013-040

## (undated) DEVICE — IMP PLATE SYN STR DOG BONE LOW PROFILE 2H TI 421.502: Type: IMPLANTABLE DEVICE | Site: CRANIAL | Status: NON-FUNCTIONAL

## (undated) DEVICE — BUR STRK ROUND DIAMOND 2.0MM COARSE 8450-012-020DC

## (undated) DEVICE — ESU ELEC BLADE 2.75" COATED/INSULATED E1455

## (undated) DEVICE — MARKER SPHERES PASSIVE MEDT PACK 5 8801075

## (undated) DEVICE — RETR ELASTIC STAYS LONE STAR BLUNT DUAL LEAD 3550-1G

## (undated) DEVICE — BUR STRK ROUND DIAMOND 2.0MM EXT 5820-012-320D

## (undated) DEVICE — ESU FCP CODMAN 8"X1.0MM SLIM TIP 9008100SL

## (undated) DEVICE — SU CHROMIC 4-0 SH 27" G121H

## (undated) DEVICE — KIT ACCESSORY LUMBAR DRAIN 910121

## (undated) DEVICE — ESU HOLSTER PLASTIC DISP E2400

## (undated) DEVICE — PENCIL PRESHARPENED

## (undated) DEVICE — SOL NACL 0.9% IRRIG 1000ML BOTTLE 2F7124

## (undated) DEVICE — NDL BLUNT 18GA 1" W/O FILTER 305181

## (undated) DEVICE — DRAPE CORETEMP FLUID WARM SYSTEM 62X56IN CTD200

## (undated) DEVICE — DRAPE EYE SHEET 8441

## (undated) DEVICE — ENDO INSERT ESU BIPOLAR FCP STORZ VERTICAL CLOSING 28164FGL

## (undated) DEVICE — DRAPE IOBAN INCISE 13X13" 6640EZ

## (undated) DEVICE — SUCTION MANIFOLD NEPTUNE 2 SYS 4 PORT 0702-020-000

## (undated) DEVICE — SU NUROLON 4-0 TF CR 8X18" C584D

## (undated) DEVICE — NDL ANGIOCATH 14GA 1.25" 4048

## (undated) DEVICE — SHEETING SILASTIC .015 REINFORCED 4X6" STERILE

## (undated) DEVICE — NDL COUNTER 20CT 31142493

## (undated) DEVICE — PREP CHLORAPREP 26ML TINTED HI-LITE ORANGE 930815

## (undated) DEVICE — NIM ELEC SUBDERMAL NDL PAIRED 2 CHANNEL 8227410

## (undated) DEVICE — SU SILK 2-0 TIE 12X30" A305H

## (undated) DEVICE — GUIDE MED SPIWAY SRG ENSL

## (undated) DEVICE — BUR STRK 2.3MM TAPERED ROUTER - FA2 5407-FA2-023

## (undated) DEVICE — STPL SKIN 35W ROTATING HEAD PRW35

## (undated) DEVICE — SPONGE COTTONOID 1/2X3" 80-1407

## (undated) DEVICE — PREP CHLORAPREP CLEAR 3ML 260400

## (undated) DEVICE — ESU CORD BIPOLAR GREEN 10-4000

## (undated) DEVICE — SPONGE COTTONOID 1X3" 20-10S

## (undated) DEVICE — SUCTION MANIFOLD DORNOCH ULTRA CART UL-CL500

## (undated) DEVICE — ESU CORD BIPOLAR AND IRR TUBING AESCULAP US355

## (undated) DEVICE — BLADE KNIFE BEAVER MICROSHARP GREEN 377515

## (undated) DEVICE — DRAPE MICROSCOPE LEICA 54X150" AR8033650

## (undated) DEVICE — SPONGE COTTONOID NEURO 1/2"X1/2" 30-054

## (undated) RX ORDER — ONDANSETRON 2 MG/ML
INJECTION INTRAMUSCULAR; INTRAVENOUS
Status: DISPENSED
Start: 2018-06-29

## (undated) RX ORDER — PHENYLEPHRINE HCL IN 0.9% NACL 1 MG/10 ML
SYRINGE (ML) INTRAVENOUS
Status: DISPENSED
Start: 2018-06-29

## (undated) RX ORDER — CALCIUM CHLORIDE 100 MG/ML
INJECTION INTRAVENOUS; INTRAVENTRICULAR
Status: DISPENSED
Start: 2022-06-02

## (undated) RX ORDER — FENTANYL CITRATE 50 UG/ML
INJECTION, SOLUTION INTRAMUSCULAR; INTRAVENOUS
Status: DISPENSED
Start: 2021-03-23

## (undated) RX ORDER — ONDANSETRON 2 MG/ML
INJECTION INTRAMUSCULAR; INTRAVENOUS
Status: DISPENSED
Start: 2021-03-23

## (undated) RX ORDER — FENTANYL CITRATE-0.9 % NACL/PF 10 MCG/ML
PLASTIC BAG, INJECTION (ML) INTRAVENOUS
Status: DISPENSED
Start: 2021-03-22

## (undated) RX ORDER — EPINEPHRINE NASAL SOLUTION 1 MG/ML
SOLUTION NASAL
Status: DISPENSED
Start: 2022-06-02

## (undated) RX ORDER — LIDOCAINE HYDROCHLORIDE AND EPINEPHRINE 10; 10 MG/ML; UG/ML
INJECTION, SOLUTION INFILTRATION; PERINEURAL
Status: DISPENSED
Start: 2018-06-29

## (undated) RX ORDER — FENTANYL CITRATE 50 UG/ML
INJECTION, SOLUTION INTRAMUSCULAR; INTRAVENOUS
Status: DISPENSED
Start: 2022-06-02

## (undated) RX ORDER — ACETAMINOPHEN 325 MG/1
TABLET ORAL
Status: DISPENSED
Start: 2018-06-29

## (undated) RX ORDER — ACETAMINOPHEN 325 MG/1
TABLET ORAL
Status: DISPENSED
Start: 2022-06-03

## (undated) RX ORDER — PROPOFOL 10 MG/ML
INJECTION, EMULSION INTRAVENOUS
Status: DISPENSED
Start: 2022-06-02

## (undated) RX ORDER — ALBUMIN, HUMAN INJ 5% 5 %
SOLUTION INTRAVENOUS
Status: DISPENSED
Start: 2018-06-29

## (undated) RX ORDER — PROPOFOL 10 MG/ML
INJECTION, EMULSION INTRAVENOUS
Status: DISPENSED
Start: 2018-06-29

## (undated) RX ORDER — LIDOCAINE HYDROCHLORIDE 20 MG/ML
INJECTION, SOLUTION EPIDURAL; INFILTRATION; INTRACAUDAL; PERINEURAL
Status: DISPENSED
Start: 2022-06-02

## (undated) RX ORDER — HYDROMORPHONE HYDROCHLORIDE 1 MG/ML
INJECTION, SOLUTION INTRAMUSCULAR; INTRAVENOUS; SUBCUTANEOUS
Status: DISPENSED
Start: 2021-03-23

## (undated) RX ORDER — FENTANYL CITRATE 50 UG/ML
INJECTION, SOLUTION INTRAMUSCULAR; INTRAVENOUS
Status: DISPENSED
Start: 2018-06-29

## (undated) RX ORDER — MINERAL OIL
OIL (ML) MISCELLANEOUS
Status: DISPENSED
Start: 2022-06-02

## (undated) RX ORDER — LIDOCAINE HYDROCHLORIDE AND EPINEPHRINE 10; 10 MG/ML; UG/ML
INJECTION, SOLUTION INFILTRATION; PERINEURAL
Status: DISPENSED
Start: 2022-06-02

## (undated) RX ORDER — HYDRALAZINE HYDROCHLORIDE 20 MG/ML
INJECTION INTRAMUSCULAR; INTRAVENOUS
Status: DISPENSED
Start: 2022-06-03

## (undated) RX ORDER — ALBUMIN, HUMAN INJ 5% 5 %
SOLUTION INTRAVENOUS
Status: DISPENSED
Start: 2022-06-02

## (undated) RX ORDER — LABETALOL HYDROCHLORIDE 5 MG/ML
INJECTION, SOLUTION INTRAVENOUS
Status: DISPENSED
Start: 2018-06-29

## (undated) RX ORDER — LIDOCAINE HYDROCHLORIDE AND EPINEPHRINE 10; 10 MG/ML; UG/ML
INJECTION, SOLUTION INFILTRATION; PERINEURAL
Status: DISPENSED
Start: 2021-03-22

## (undated) RX ORDER — FENTANYL CITRATE 50 UG/ML
INJECTION, SOLUTION INTRAMUSCULAR; INTRAVENOUS
Status: DISPENSED
Start: 2021-03-22

## (undated) RX ORDER — ESMOLOL HYDROCHLORIDE 10 MG/ML
INJECTION INTRAVENOUS
Status: DISPENSED
Start: 2021-03-22

## (undated) RX ORDER — OXYCODONE HYDROCHLORIDE 5 MG/1
TABLET ORAL
Status: DISPENSED
Start: 2022-06-03

## (undated) RX ORDER — ADENOSINE 3 MG/ML
INJECTION, SOLUTION INTRAVENOUS
Status: DISPENSED
Start: 2022-06-02

## (undated) RX ORDER — SODIUM CHLORIDE 9 MG/ML
INJECTION, SOLUTION INTRAVENOUS
Status: DISPENSED
Start: 2022-06-02

## (undated) RX ORDER — LIDOCAINE HYDROCHLORIDE 20 MG/ML
INJECTION, SOLUTION EPIDURAL; INFILTRATION; INTRACAUDAL; PERINEURAL
Status: DISPENSED
Start: 2021-03-22

## (undated) RX ORDER — GLYCOPYRROLATE 0.2 MG/ML
INJECTION, SOLUTION INTRAMUSCULAR; INTRAVENOUS
Status: DISPENSED
Start: 2018-06-29

## (undated) RX ORDER — CALCIUM CHLORIDE 100 MG/ML
INJECTION INTRAVENOUS; INTRAVENTRICULAR
Status: DISPENSED
Start: 2018-06-29

## (undated) RX ORDER — PROPOFOL 10 MG/ML
INJECTION, EMULSION INTRAVENOUS
Status: DISPENSED
Start: 2021-03-22

## (undated) RX ORDER — SODIUM CHLORIDE 9 MG/ML
INJECTION, SOLUTION INTRAVENOUS
Status: DISPENSED
Start: 2018-06-29

## (undated) RX ORDER — CEFAZOLIN SODIUM 1 G/3ML
INJECTION, POWDER, FOR SOLUTION INTRAMUSCULAR; INTRAVENOUS
Status: DISPENSED
Start: 2021-03-22

## (undated) RX ORDER — DEXAMETHASONE SODIUM PHOSPHATE 4 MG/ML
INJECTION, SOLUTION INTRA-ARTICULAR; INTRALESIONAL; INTRAMUSCULAR; INTRAVENOUS; SOFT TISSUE
Status: DISPENSED
Start: 2022-06-02

## (undated) RX ORDER — ONDANSETRON 2 MG/ML
INJECTION INTRAMUSCULAR; INTRAVENOUS
Status: DISPENSED
Start: 2022-06-02

## (undated) RX ORDER — CEFTRIAXONE 2 G/1
INJECTION, POWDER, FOR SOLUTION INTRAMUSCULAR; INTRAVENOUS
Status: DISPENSED
Start: 2018-06-29

## (undated) RX ORDER — ESMOLOL HYDROCHLORIDE 10 MG/ML
INJECTION INTRAVENOUS
Status: DISPENSED
Start: 2018-06-29

## (undated) RX ORDER — CEFTRIAXONE SODIUM 1 G
VIAL (EA) INJECTION
Status: DISPENSED
Start: 2022-06-02

## (undated) RX ORDER — EPHEDRINE SULFATE 50 MG/ML
INJECTION, SOLUTION INTRAMUSCULAR; INTRAVENOUS; SUBCUTANEOUS
Status: DISPENSED
Start: 2018-06-29

## (undated) RX ORDER — OXYMETAZOLINE HYDROCHLORIDE 0.05 G/100ML
SPRAY NASAL
Status: DISPENSED
Start: 2018-06-29

## (undated) RX ORDER — HYDRALAZINE HYDROCHLORIDE 20 MG/ML
INJECTION INTRAMUSCULAR; INTRAVENOUS
Status: DISPENSED
Start: 2018-06-29

## (undated) RX ORDER — CEFAZOLIN SODIUM 2 G/100ML
INJECTION, SOLUTION INTRAVENOUS
Status: DISPENSED
Start: 2018-06-29

## (undated) RX ORDER — EPHEDRINE SULFATE 50 MG/ML
INJECTION, SOLUTION INTRAMUSCULAR; INTRAVENOUS; SUBCUTANEOUS
Status: DISPENSED
Start: 2021-03-22

## (undated) RX ORDER — ATROPINE SULFATE 0.4 MG/ML
AMPUL (ML) INJECTION
Status: DISPENSED
Start: 2021-03-22

## (undated) RX ORDER — POTASSIUM CHLORIDE 750 MG/1
TABLET, EXTENDED RELEASE ORAL
Status: DISPENSED
Start: 2022-06-03

## (undated) RX ORDER — LABETALOL HYDROCHLORIDE 5 MG/ML
INJECTION, SOLUTION INTRAVENOUS
Status: DISPENSED
Start: 2022-06-03

## (undated) RX ORDER — CEFAZOLIN SODIUM 1 G/3ML
INJECTION, POWDER, FOR SOLUTION INTRAMUSCULAR; INTRAVENOUS
Status: DISPENSED
Start: 2018-06-29

## (undated) RX ORDER — OXYMETAZOLINE HYDROCHLORIDE 0.05 G/100ML
SPRAY NASAL
Status: DISPENSED
Start: 2022-06-02

## (undated) RX ORDER — GLYCOPYRROLATE 0.2 MG/ML
INJECTION, SOLUTION INTRAMUSCULAR; INTRAVENOUS
Status: DISPENSED
Start: 2021-03-22

## (undated) RX ORDER — HYDROMORPHONE HCL IN WATER/PF 6 MG/30 ML
PATIENT CONTROLLED ANALGESIA SYRINGE INTRAVENOUS
Status: DISPENSED
Start: 2022-06-02

## (undated) RX ORDER — ONDANSETRON 2 MG/ML
INJECTION INTRAMUSCULAR; INTRAVENOUS
Status: DISPENSED
Start: 2021-03-22

## (undated) RX ORDER — DEXAMETHASONE SODIUM PHOSPHATE 4 MG/ML
INJECTION, SOLUTION INTRA-ARTICULAR; INTRALESIONAL; INTRAMUSCULAR; INTRAVENOUS; SOFT TISSUE
Status: DISPENSED
Start: 2018-06-29

## (undated) RX ORDER — SODIUM CHLORIDE 9 MG/ML
INJECTION, SOLUTION INTRAVENOUS
Status: DISPENSED
Start: 2021-03-22